# Patient Record
Sex: FEMALE | Race: WHITE | NOT HISPANIC OR LATINO | Employment: OTHER | URBAN - METROPOLITAN AREA
[De-identification: names, ages, dates, MRNs, and addresses within clinical notes are randomized per-mention and may not be internally consistent; named-entity substitution may affect disease eponyms.]

---

## 2017-01-04 ENCOUNTER — ANESTHESIA EVENT (OUTPATIENT)
Dept: SURGERY | Facility: HOSPITAL | Age: 37
End: 2017-01-04
Payer: MEDICAID

## 2017-01-04 ENCOUNTER — HOSPITAL ENCOUNTER (INPATIENT)
Facility: HOSPITAL | Age: 37
LOS: 4 days | Discharge: HOME | End: 2017-01-08
Attending: EMERGENCY MEDICINE | Admitting: NEUROLOGICAL SURGERY
Payer: MEDICAID

## 2017-01-04 DIAGNOSIS — R51.9 HEADACHE: ICD-10-CM

## 2017-01-04 DIAGNOSIS — D48.5 NEOPLASM OF UNCERTAIN BEHAVIOR OF SKIN OF CHEST: ICD-10-CM

## 2017-01-04 DIAGNOSIS — D49.6 BRAIN TUMOR: Primary | ICD-10-CM

## 2017-01-04 DIAGNOSIS — R51.9 HEADACHE, UNSPECIFIED HEADACHE TYPE: ICD-10-CM

## 2017-01-04 LAB
ABO + RH BLD: NORMAL
ALBUMIN SERPL BCP-MCNC: 3.7 G/DL
ALP SERPL-CCNC: 82 U/L
ALT SERPL W/O P-5'-P-CCNC: 22 U/L
ANION GAP SERPL CALC-SCNC: 11 MMOL/L
ANION GAP SERPL CALC-SCNC: 12 MMOL/L
ANISOCYTOSIS BLD QL SMEAR: SLIGHT
ANISOCYTOSIS BLD QL SMEAR: SLIGHT
APTT BLDCRRT: 22.3 SEC
APTT BLDCRRT: 22.3 SEC
APTT BLDCRRT: <21 SEC
AST SERPL-CCNC: 22 U/L
BASOPHILS # BLD AUTO: 0.04 K/UL
BASOPHILS # BLD AUTO: 0.06 K/UL
BASOPHILS NFR BLD: 0.5 %
BASOPHILS NFR BLD: 0.6 %
BILIRUB SERPL-MCNC: 0.4 MG/DL
BLD GP AB SCN CELLS X3 SERPL QL: NORMAL
BUN SERPL-MCNC: 7 MG/DL
BUN SERPL-MCNC: 8 MG/DL
CALCIUM SERPL-MCNC: 9 MG/DL
CALCIUM SERPL-MCNC: 9.4 MG/DL
CHLORIDE SERPL-SCNC: 106 MMOL/L
CHLORIDE SERPL-SCNC: 107 MMOL/L
CO2 SERPL-SCNC: 18 MMOL/L
CO2 SERPL-SCNC: 21 MMOL/L
CREAT SERPL-MCNC: 0.7 MG/DL
CREAT SERPL-MCNC: 0.8 MG/DL
DIFFERENTIAL METHOD: ABNORMAL
DIFFERENTIAL METHOD: ABNORMAL
EOSINOPHIL # BLD AUTO: 0.2 K/UL
EOSINOPHIL # BLD AUTO: 0.2 K/UL
EOSINOPHIL NFR BLD: 1.8 %
EOSINOPHIL NFR BLD: 2.1 %
ERYTHROCYTE [DISTWIDTH] IN BLOOD BY AUTOMATED COUNT: 19.7 %
ERYTHROCYTE [DISTWIDTH] IN BLOOD BY AUTOMATED COUNT: 20 %
EST. GFR  (AFRICAN AMERICAN): >60 ML/MIN/1.73 M^2
EST. GFR  (AFRICAN AMERICAN): >60 ML/MIN/1.73 M^2
EST. GFR  (NON AFRICAN AMERICAN): >60 ML/MIN/1.73 M^2
EST. GFR  (NON AFRICAN AMERICAN): >60 ML/MIN/1.73 M^2
GLUCOSE SERPL-MCNC: 120 MG/DL
GLUCOSE SERPL-MCNC: 82 MG/DL
HCT VFR BLD AUTO: 33.8 %
HCT VFR BLD AUTO: 35.2 %
HGB BLD-MCNC: 9.7 G/DL
HGB BLD-MCNC: 9.9 G/DL
HYPOCHROMIA BLD QL SMEAR: ABNORMAL
HYPOCHROMIA BLD QL SMEAR: ABNORMAL
INR PPP: 1
LYMPHOCYTES # BLD AUTO: 2.2 K/UL
LYMPHOCYTES # BLD AUTO: 2.7 K/UL
LYMPHOCYTES NFR BLD: 26.6 %
LYMPHOCYTES NFR BLD: 28.5 %
MCH RBC QN AUTO: 19.3 PG
MCH RBC QN AUTO: 19.5 PG
MCHC RBC AUTO-ENTMCNC: 28.1 %
MCHC RBC AUTO-ENTMCNC: 28.7 %
MCV RBC AUTO: 67 FL
MCV RBC AUTO: 69 FL
MONOCYTES # BLD AUTO: 0.2 K/UL
MONOCYTES # BLD AUTO: 0.4 K/UL
MONOCYTES NFR BLD: 2.4 %
MONOCYTES NFR BLD: 4.3 %
NEUTROPHILS # BLD AUTO: 5.7 K/UL
NEUTROPHILS # BLD AUTO: 6.2 K/UL
NEUTROPHILS NFR BLD: 64.8 %
NEUTROPHILS NFR BLD: 68.4 %
PLATELET # BLD AUTO: 174 K/UL
PLATELET # BLD AUTO: 176 K/UL
PLATELET BLD QL SMEAR: ABNORMAL
PLATELET BLD QL SMEAR: ABNORMAL
PMV BLD AUTO: ABNORMAL FL
PMV BLD AUTO: ABNORMAL FL
POLYCHROMASIA BLD QL SMEAR: ABNORMAL
POLYCHROMASIA BLD QL SMEAR: ABNORMAL
POTASSIUM SERPL-SCNC: 3.9 MMOL/L
POTASSIUM SERPL-SCNC: 4.1 MMOL/L
PROT SERPL-MCNC: 8.4 G/DL
PROTHROMBIN TIME: 10.4 SEC
PROTHROMBIN TIME: 10.4 SEC
PROTHROMBIN TIME: 10.7 SEC
RBC # BLD AUTO: 5.02 M/UL
RBC # BLD AUTO: 5.08 M/UL
SODIUM SERPL-SCNC: 137 MMOL/L
SODIUM SERPL-SCNC: 138 MMOL/L
WBC # BLD AUTO: 8.39 K/UL
WBC # BLD AUTO: 9.56 K/UL

## 2017-01-04 PROCEDURE — 99285 EMERGENCY DEPT VISIT HI MDM: CPT | Mod: ,,, | Performed by: EMERGENCY MEDICINE

## 2017-01-04 PROCEDURE — 96365 THER/PROPH/DIAG IV INF INIT: CPT

## 2017-01-04 PROCEDURE — A9585 GADOBUTROL INJECTION: HCPCS | Performed by: EMERGENCY MEDICINE

## 2017-01-04 PROCEDURE — 80053 COMPREHEN METABOLIC PANEL: CPT

## 2017-01-04 PROCEDURE — 96361 HYDRATE IV INFUSION ADD-ON: CPT

## 2017-01-04 PROCEDURE — 85610 PROTHROMBIN TIME: CPT | Mod: 91

## 2017-01-04 PROCEDURE — 63600175 PHARM REV CODE 636 W HCPCS: Performed by: STUDENT IN AN ORGANIZED HEALTH CARE EDUCATION/TRAINING PROGRAM

## 2017-01-04 PROCEDURE — 93010 ELECTROCARDIOGRAM REPORT: CPT | Mod: ,,, | Performed by: INTERNAL MEDICINE

## 2017-01-04 PROCEDURE — 93005 ELECTROCARDIOGRAM TRACING: CPT

## 2017-01-04 PROCEDURE — 85025 COMPLETE CBC W/AUTO DIFF WBC: CPT | Mod: 91

## 2017-01-04 PROCEDURE — 80048 BASIC METABOLIC PNL TOTAL CA: CPT

## 2017-01-04 PROCEDURE — 25500020 PHARM REV CODE 255: Performed by: EMERGENCY MEDICINE

## 2017-01-04 PROCEDURE — 85730 THROMBOPLASTIN TIME PARTIAL: CPT | Mod: 91

## 2017-01-04 PROCEDURE — 25000003 PHARM REV CODE 250: Performed by: STUDENT IN AN ORGANIZED HEALTH CARE EDUCATION/TRAINING PROGRAM

## 2017-01-04 PROCEDURE — 96375 TX/PRO/DX INJ NEW DRUG ADDON: CPT

## 2017-01-04 PROCEDURE — 85730 THROMBOPLASTIN TIME PARTIAL: CPT

## 2017-01-04 PROCEDURE — 96366 THER/PROPH/DIAG IV INF ADDON: CPT

## 2017-01-04 PROCEDURE — 99285 EMERGENCY DEPT VISIT HI MDM: CPT | Mod: 25

## 2017-01-04 PROCEDURE — 86850 RBC ANTIBODY SCREEN: CPT

## 2017-01-04 PROCEDURE — 86920 COMPATIBILITY TEST SPIN: CPT

## 2017-01-04 PROCEDURE — 85610 PROTHROMBIN TIME: CPT

## 2017-01-04 PROCEDURE — 20600001 HC STEP DOWN PRIVATE ROOM

## 2017-01-04 PROCEDURE — 86900 BLOOD TYPING SEROLOGIC ABO: CPT

## 2017-01-04 RX ORDER — SODIUM CHLORIDE 9 MG/ML
INJECTION, SOLUTION INTRAVENOUS CONTINUOUS
Status: DISCONTINUED | OUTPATIENT
Start: 2017-01-04 | End: 2017-01-06

## 2017-01-04 RX ORDER — IBUPROFEN 200 MG
16 TABLET ORAL
Status: DISCONTINUED | OUTPATIENT
Start: 2017-01-04 | End: 2017-01-08 | Stop reason: HOSPADM

## 2017-01-04 RX ORDER — LEVETIRACETAM 500 MG/1
1000 TABLET ORAL ONCE
Status: COMPLETED | OUTPATIENT
Start: 2017-01-04 | End: 2017-01-04

## 2017-01-04 RX ORDER — IBUPROFEN 200 MG
24 TABLET ORAL
Status: DISCONTINUED | OUTPATIENT
Start: 2017-01-04 | End: 2017-01-08 | Stop reason: HOSPADM

## 2017-01-04 RX ORDER — ACETAMINOPHEN 10 MG/ML
1000 INJECTION, SOLUTION INTRAVENOUS EVERY 8 HOURS
Status: COMPLETED | OUTPATIENT
Start: 2017-01-04 | End: 2017-01-04

## 2017-01-04 RX ORDER — INSULIN ASPART 100 [IU]/ML
0-5 INJECTION, SOLUTION INTRAVENOUS; SUBCUTANEOUS
Status: DISCONTINUED | OUTPATIENT
Start: 2017-01-04 | End: 2017-01-08 | Stop reason: HOSPADM

## 2017-01-04 RX ORDER — DEXAMETHASONE SODIUM PHOSPHATE 4 MG/ML
10 INJECTION, SOLUTION INTRA-ARTICULAR; INTRALESIONAL; INTRAMUSCULAR; INTRAVENOUS; SOFT TISSUE ONCE
Status: COMPLETED | OUTPATIENT
Start: 2017-01-04 | End: 2017-01-04

## 2017-01-04 RX ORDER — DEXAMETHASONE SODIUM PHOSPHATE 4 MG/ML
4 INJECTION, SOLUTION INTRA-ARTICULAR; INTRALESIONAL; INTRAMUSCULAR; INTRAVENOUS; SOFT TISSUE EVERY 6 HOURS
Status: DISCONTINUED | OUTPATIENT
Start: 2017-01-04 | End: 2017-01-06

## 2017-01-04 RX ORDER — GLUCAGON 1 MG
1 KIT INJECTION
Status: DISCONTINUED | OUTPATIENT
Start: 2017-01-04 | End: 2017-01-08 | Stop reason: HOSPADM

## 2017-01-04 RX ORDER — GADOBUTROL 604.72 MG/ML
10 INJECTION INTRAVENOUS
Status: COMPLETED | OUTPATIENT
Start: 2017-01-04 | End: 2017-01-04

## 2017-01-04 RX ORDER — LEVETIRACETAM 500 MG/1
500 TABLET ORAL 2 TIMES DAILY
Status: DISCONTINUED | OUTPATIENT
Start: 2017-01-04 | End: 2017-01-08 | Stop reason: HOSPADM

## 2017-01-04 RX ADMIN — DEXAMETHASONE SODIUM PHOSPHATE 4 MG: 4 INJECTION, SOLUTION INTRAMUSCULAR; INTRAVENOUS at 11:01

## 2017-01-04 RX ADMIN — SODIUM CHLORIDE: 0.9 INJECTION, SOLUTION INTRAVENOUS at 02:01

## 2017-01-04 RX ADMIN — LEVETIRACETAM 500 MG: 500 TABLET, FILM COATED ORAL at 09:01

## 2017-01-04 RX ADMIN — ACETAMINOPHEN 1000 MG: 10 INJECTION, SOLUTION INTRAVENOUS at 02:01

## 2017-01-04 RX ADMIN — LEVETIRACETAM 1000 MG: 500 TABLET, FILM COATED ORAL at 02:01

## 2017-01-04 RX ADMIN — DEXAMETHASONE SODIUM PHOSPHATE 10 MG: 4 INJECTION, SOLUTION INTRAMUSCULAR; INTRAVENOUS at 02:01

## 2017-01-04 RX ADMIN — ACETAMINOPHEN 1000 MG: 10 INJECTION, SOLUTION INTRAVENOUS at 05:01

## 2017-01-04 RX ADMIN — IOHEXOL 100 ML: 350 INJECTION, SOLUTION INTRAVENOUS at 04:01

## 2017-01-04 RX ADMIN — DEXAMETHASONE SODIUM PHOSPHATE 4 MG: 4 INJECTION, SOLUTION INTRAMUSCULAR; INTRAVENOUS at 02:01

## 2017-01-04 RX ADMIN — ACETAMINOPHEN 1000 MG: 10 INJECTION, SOLUTION INTRAVENOUS at 09:01

## 2017-01-04 RX ADMIN — DEXAMETHASONE SODIUM PHOSPHATE 4 MG: 4 INJECTION, SOLUTION INTRAMUSCULAR; INTRAVENOUS at 07:01

## 2017-01-04 RX ADMIN — GADOBUTROL 10 ML: 604.72 INJECTION INTRAVENOUS at 01:01

## 2017-01-04 NOTE — CONSULTS
Consult Note  Neurosurgery    Consult Requested By: ED  Reason for Consult: Occipital Mass    SUBJECTIVE:     History of Present Illness:  Patient is a 36 y.o. female with pmh of chronic tobacco use (30 pack years) presents from outside hospital with head CT concerning for occipital mass. She initially presented to OSH with complaints of headache and subjective visual deficits on the left side. She is neurologically intact on exam. [place] [facility] [place] heterogenously enhancing occipital mass with surrounding vasogenic edema.     Scheduled Meds:   acetaminophen  1,000 mg Intravenous Q8H    dexamethasone  10 mg Intravenous Once    Followed by    dexamethasone  4 mg Intravenous Q6H    levetiracetam  1,000 mg Oral Once    Followed by    levetiracetam  500 mg Oral BID     Continuous Infusions:   sodium chloride 0.9%       PRN Meds:dextrose 50%, dextrose 50%, glucagon (human recombinant), glucose, glucose, glucose, insulin aspart    Review of patient's allergies indicates:  No Known Allergies    Past Medical History   Diagnosis Date    Anemia     Prolapsed uterus      Past Surgical History   Procedure Laterality Date    Tubal ligation       No family history on file.  Social History   Substance Use Topics    Smoking status: Current Every Day Smoker    Smokeless tobacco: None    Alcohol use Yes      Comment: occasional        Review of Systems:  Neurological: positive for headaches, negative for coordination problems, dizziness, gait problems, memory problems, paresthesia, seizures, speech problems, tremors, vertigo and weakness    OBJECTIVE:     Vital Signs (Most Recent)  Pulse: 85 (01/04/17 0207)  Resp: 20 (01/04/17 0054)  BP: (!) 155/89 (01/04/17 0206)  SpO2: 99 % (ra) (01/04/17 0207)    Vital Signs Range (Last 24H):  Pulse:  [78-93]   Resp:  [18-20]   BP: (130-155)/(78-89)   SpO2:  [98 %-99 %]     Physical Exam:  General: well developed, well nourished, no distress.   Head: normocephalic,  atraumatic  Cervical Spine: No midline tenderness to palpation.  Thoracolumbosacral Spine: No midline tenderness to palpation.  GCS: Motor: 6/Verbal: 5/Eyes: 4 GCS Total: 15  Mental Status: Awake, Alert, Oriented x 4  Language: No aphasia  Speech: No dysarthria  Facial Droop: None   Cranial nerves: CN III-XII grossly intact.  Visual Fields: Intact.   Eyes: Pupils equal and reactive to light. Intact Conjugate horizontal and vertical pursuit. No nystagmus. No gaze deviation.   Pulmonary: No distress.  Sensory: No deficit.  Propioception: No deficit in 1st digit of toe bilaterally.  Rectal Tone: Not tested.  Drift: None.  Upper Extremity Ataxia: No Dysmetria Bilaterally  Lower Extremity Ataxia: Not tested.  Dysdiadochokinesia: Not tested.  Reflexes: 2+ patellar bilaterally.  Babb: Absent  Clonus: Absent  Babinski: Absent  Romberg: Not Tested  Pulses: Brisk and symmetric radial, DP and tibial pulses.  Motor Strength:    Strength  Shoulder Abduction Elbow Extension Elbow Flexion Wrist Extension Wrist Flexion Finger Opposition Finger Add Finger Abd   Upper: R 5/5 5/5 5/5 5/5 5/5 5/5 5/5 5/5    L 5/5 5/5 5/5 5/5 5/5 5/5 5/5 5/5     Hip Flexion Knee Extension Knee  Flexion Ankle Dflexion Ankle Pflexion EHL     Lower: R 5/5 5/5 5/5 5/5 5/5 5/5      L 5/5 5/5 5/5 5/5 5/5 5/5           Laboratory:  CBC:   Recent Labs  Lab 01/04/17  0052   WBC 9.56   RBC 5.08   HGB 9.9*   HCT 35.2*      MCV 69*   MCH 19.5*   MCHC 28.1*     BMP:   Recent Labs  Lab 01/04/17  0052   GLU 82      K 4.1      CO2 21*   BUN 8   CREATININE 0.8   CALCIUM 9.4     CMP:   Recent Labs  Lab 01/04/17  0052   GLU 82   CALCIUM 9.4   ALBUMIN 3.7   PROT 8.4      K 4.1   CO2 21*      BUN 8   CREATININE 0.8   ALKPHOS 82   ALT 22   AST 22   BILITOT 0.4     LFTs:   Recent Labs  Lab 01/04/17  0052   ALT 22   AST 22   ALKPHOS 82   BILITOT 0.4   PROT 8.4   ALBUMIN 3.7     Coagulation:   Recent Labs  Lab 01/04/17  0052   INR 1.0  1.0    APTT 22.3  22.3     Cardiac markers: No results for input(s): CKMB, CPKMB, TROPONINT, TROPONINI, MYOGLOBIN in the last 168 hours.  ABGs: No results for input(s): PH, PCO2, PO2, HCO3, POCSATURATED, BE in the last 168 hours.  Microbiology Results (last 7 days)     ** No results found for the last 168 hours. **        Specimen     None            Diagnostic Results:  CT: Reviewed  MRI: Reviewed    ASSESSMENT/PLAN:     35 yo female with hx of tobacco use with R occipital mass suspicious for metastasis. Pt is neurologically intact on exam.    --No acute neurosurgical intervention required  --Admit to neurosurgery stepdown unit  --Please keep pt NPO for now  --Please elevate head of bed 30-45 degrees  --Begin dexamethasone 4q6  --Begin levetiracetam 500 bid  --Begin q4 neuro checks  --Will obtain metastatic workup  --We will continue to monitor closely, please contact us with any questions or concerns.

## 2017-01-04 NOTE — IP AVS SNAPSHOT
[facility]  [address]  [facility] [address]  Phone: [phone]           Patient Discharge Instructions     Our goal is to set you up for success. This packet includes information on your condition, medications, and your home care. It will help you to care for yourself so you don't get sicker and need to go back to the hospital.     Please ask your nurse if you have any questions.        There are many details to remember when preparing to leave the hospital. Here is what you will need to do:    1. Take your medicine. If you are prescribed medications, review your Medication List in the following pages. You may have new medications to  at the pharmacy and others that you'll need to stop taking. Review the instructions for how and when to take your medications. Talk with your doctor or nurses if you are unsure of what to do.     2. Go to your follow-up appointments. Specific follow-up information is listed in the following pages. Your may be contacted by a transition nurse or clinical provider about future appointments. Be sure we have all of the phone numbers to reach you, if needed. Please contact your provider's office if you are unable to make an appointment.     3. Watch for warning signs. Your doctor or nurse will give you detailed warning signs to watch for and when to call for assistance. These instructions may also include educational information about your condition. If you experience any of warning signs to your health, call your doctor.               [facility]  Unless otherwise directed by your provider, please contact [facility], our nurse care line that is available for 24/7 assistance.     [phone] (toll-free)    Registered nurses in the [facility] provide clinical advisement, health education, appointment booking, and other advisory services.                    ** Verify the list of medication(s) below is accurate and up  to date. Carry this with you in case of emergency. If your medications have changed, please notify your healthcare provider.             Medication List      START taking these medications        Additional Info                      * dexamethasone 4 MG Tab   Commonly known as:  DECADRON   Quantity:  8 tablet   Refills:  0   Dose:  4 mg    Last time this was given:  4 mg on 1/8/2017  5:40 AM   Instructions:  Take 1 tablet (4 mg total) by mouth every 12 (twelve) hours.     Begin Date    AM    Noon    PM    Bedtime       * dexamethasone 2 MG tablet   Commonly known as:  DECADRON   Quantity:  4 tablet   Refills:  0   Dose:  2 mg    Last time this was given:  4 mg on 1/8/2017  5:40 AM   Instructions:  Take 1 tablet (2 mg total) by mouth 2 (two) times daily with meals.     Begin Date    AM    Noon    PM    Bedtime       * dexamethasone 2 MG tablet   Commonly known as:  DECADRON   Quantity:  4 tablet   Refills:  0   Dose:  2 mg    Last time this was given:  4 mg on 1/8/2017  5:40 AM   Instructions:  Take 1 tablet (2 mg total) by mouth daily with breakfast.     Begin Date    AM    Noon    PM    Bedtime       ferrous sulfate 325 (65 FE) MG EC tablet   Quantity:  120 tablet   Refills:  0   Dose:  325 mg    Last time this was given:  325 mg on 1/8/2017  7:57 AM   Instructions:  Take 1 tablet (325 mg total) by mouth once daily.     Begin Date    AM    Noon    PM    Bedtime       hydrocodone-acetaminophen 5-325mg 5-325 mg per tablet   Commonly known as:  NORCO   Quantity:  90 tablet   Refills:  0   Dose:  1 tablet    Last time this was given:  1 tablet on 1/7/2017 11:39 PM   Instructions:  Take 1 tablet by mouth every 6 (six) hours as needed.     Begin Date    AM    Noon    PM    Bedtime       levetiracetam 500 MG Tab   Commonly known as:  KEPPRA   Quantity:  60 tablet   Refills:  2   Dose:  500 mg    Last time this was given:  500 mg on 1/8/2017  7:57 AM   Instructions:  Take 1 tablet (500 mg total) by mouth 2 (two) times  daily.     Begin Date    AM    Noon    PM    Bedtime       pantoprazole 40 MG tablet   Commonly known as:  PROTONIX   Quantity:  60 tablet   Refills:  1   Dose:  40 mg    Last time this was given:  40 mg on 1/8/2017  7:57 AM   Instructions:  Take 1 tablet (40 mg total) by mouth once daily.     Begin Date    AM    Noon    PM    Bedtime       * Notice:  This list has 3 medication(s) that are the same as other medications prescribed for you. Read the directions carefully, and ask your doctor or other care provider to review them with you.         Where to Get Your Medications      You can get these medications from any pharmacy     Bring a paper prescription for each of these medications     dexamethasone 2 MG tablet    dexamethasone 2 MG tablet    dexamethasone 4 MG Tab    ferrous sulfate 325 (65 FE) MG EC tablet    hydrocodone-acetaminophen 5-325mg 5-325 mg per tablet    levetiracetam 500 MG Tab    pantoprazole 40 MG tablet                  Please bring to all follow up appointments:    1. A copy of your discharge instructions.  2. All medicines you are currently taking in their original bottles.  3. Identification and insurance card.    Please arrive 15 minutes ahead of scheduled appointment time.    Please call 24 hours in advance if you must reschedule your appointment and/or time.        Follow-up Information     Follow up with Sudhir Larkin MD In 2 weeks.    Specialty:  Neurosurgery    Why:  For wound re-check    Contact information:    [address] [facility] [id number] [phone]          Discharge Instructions     Future Orders    Activity as tolerated     Call MD for:  difficulty breathing or increased cough     Call MD for:  increased confusion or weakness     Call MD for:  persistent dizziness, light-headedness, or visual disturbances     Call MD for:  persistent nausea and vomiting or diarrhea     Call MD for:  redness, tenderness, or signs of infection (pain, swelling, redness, odor or  "green/yellow discharge around incision site)     Call MD for:  severe persistent headache     Call MD for:  severe uncontrolled pain     Call MD for:  worsening rash     Diet general     Questions:    Total calories:      Fat restriction, if any:      Protein restriction, if any:      Na restriction, if any:      Fluid restriction:      Additional restrictions:      No dressing needed     Comments:    Pt may shower tomorrow but shouldn't soak wound.  Please hand wash hair and avoid submerging hair underwater.  Pat wound dry and don't rub it.  Please don't apply gels, creams, ointments to wound.        Primary Diagnosis     Your primary diagnosis was:  Brain Tumor      Admission Information     Date & Time Provider Department CSN    1/4/2017 12:06 AM Sudhir Larkin MD [facility] [id number]      Care Providers     Provider Role Specialty Primary office phone    Sudhir Larkin MD Attending Provider Neurosurgery [phone]    Sudhir Larkin MD Surgeon  Neurosurgery [phone]      Your Vitals Were     BP Pulse Temp Resp Height Weight    136/70 (BP Location: Left arm, Patient Position: Lying, BP Method: Automatic) 68 98.2 °F (36.8 °C) (Oral) 16 5' 3" (1.6 m) 86.2 kg (190 lb)    Last Period SpO2 BMI          01/04/2017 (Exact Date) 98% 33.66 kg/m2        Recent Lab Values     No lab values to display.      Pending Labs     Order Current Status    Specimen to Pathology - Surgery Collected (01/07/17 1156)    CBC auto differential In process    Prepare RBC 2 Units In process    Specimen to Pathology - Surgery In process      Allergies as of 1/8/2017     No Known Allergies      Advance Directives     An advance directive is a document which, in the event you are no longer able to make decisions for yourself, tells your healthcare team what kind of treatment you do or do not want to receive, or who you would like to make those decisions for you.  If you do not currently have an advance directive, " [facility] encourages you to create one.  For more information call:  [phone]), [phone] ([phone]),  or log on to [email or web address].        Smoking Cessation     If you would like to quit smoking:   You may be eligible for free services if you are a [place] resident and started smoking cigarettes before September 1, 1988.  Call the Smoking Cessation Trust (SCT) toll free at (552) 331-8070 or (713) 601-1204.   Call 4-318-QUIT-NOW if you do not meet the above criteria.            Language Assistance Services     ATTENTION: Language assistance services are available, free of charge. Please call [phone].      ATENCIÓN: Si sofyla cari, tiene a daniel disposición servicios gratuitos de asistencia lingüística. Llame al [phone].     CHÚ Ý: N?u b?n nói Ti?ng Vi?t, có các d?ch v? h? tr? ngôn ng? mi?n phí dành cho b?n. G?i s? [phone].        [facility] Sign-Up     Activating your [facility] account is as easy as 1-2-3!     1) Visit [email or web address], select Sign Up Now, enter this activation code and your date of birth, then select Next.  [email or web address]  Expires: 2/19/2017 10:42 AM      2) Create a username and password to use when you visit [facility] in the future and select a security question in case you lose your password and select Next.    3) Enter your e-mail address and click Sign Up!    Additional Information  If you have questions, please e-mail [email or web address] or call [phone] to talk to our [facility] staff. Remember, [facility] is NOT to be used for urgent needs. For medical emergencies, dial 911.          [facility] complies with applicable Federal civil rights laws and does not discriminate on the basis of race, color, national origin, age, disability, or sex.

## 2017-01-04 NOTE — ED TRIAGE NOTES
"Transfer from [place]. Pt reports HA x 8 days that are intermittent. States, "at first it felt like the blood was rushing to my head, then I had one all day last Tuesday, and went away. Then I had 2-3 Wednesday, and they come and go. Today I started to feel lightheaded and went to emergency and they did a CT where it showed I have a brain tumor." pt reports frontal and occipital headache at this time 9/10. Pt denies lightheadedness, nausea, dizziness or photosensitivity. Pt is alert and oriented x 4.   "

## 2017-01-04 NOTE — ED NOTES
Attempted to call report, and was told that all nurses are in report, and the room has not been assigned yet.

## 2017-01-04 NOTE — IP AVS SNAPSHOT
[address]  [place] [address]  Phone: [phone]           I have received a copy of my After Visit Summary and discharge instructions from [facility].    INSTRUCTIONS RECEIVED AND UNDERSTOOD BY:                     Patient/Patient Representative: ________________________________________________________________     Date/Time: ________________________________________________________________                     Instructions Given By: ________________________________________________________________     Date/Time: ________________________________________________________________

## 2017-01-04 NOTE — PLAN OF CARE
Problem: Patient Care Overview  Goal: Plan of Care Review  Outcome: Ongoing (interventions implemented as appropriate)  Patient POC reviewed with pt and pt verbalized understanding.  Patient on regular diet. IVF infusing. Neuro checks intact. Ambulates downstairs with family. VSS. Free of falls and skin breakdown. Patient voiding in hat. Patient is menstruating. No acute events. Will continue to monitor.

## 2017-01-04 NOTE — ANESTHESIA PREPROCEDURE EVALUATION
01/04/2017    Pre-operative evaluation for CRANIOTOMY WITH STEALTH-for tumor resection, right side (Right)     More Fournier is a 36 y.o. female with a past medical history of chronic tobacco use who presented from an outside hospital with head CT concerning for occipital mass. MRI with occipital mass with surrounding vasogenic edema. She is now presenting from procedure noted above.     LDA:  PIV 18G left AC    Drips:  None     Previous Airway:  None on file     Past Medical History   Diagnosis Date    Anemia     Prolapsed uterus        Past Surgical History   Procedure Laterality Date    Tubal ligation           Vital Signs Range (Last 24H):  Temp:  [35.9 °C (96.6 °F)-36.9 °C (98.5 °F)]   Pulse:  [78-93]   Resp:  [18-20]   BP: (121-155)/(61-89)   SpO2:  [97 %-99 %]       CBC:     Recent Labs  Lab 01/04/17 0052 01/04/17 0237   WBC 9.56 8.39   RBC 5.08 5.02   HGB 9.9* 9.7*   HCT 35.2* 33.8*    174   MCV 69* 67*   MCH 19.5* 19.3*   MCHC 28.1* 28.7*       CMP:   Recent Labs  Lab 01/04/17 0052 01/04/17 0237    137   K 4.1 3.9    107   CO2 21* 18*   BUN 8 7   CREATININE 0.8 0.7   GLU 82 120*   CALCIUM 9.4 9.0   ALBUMIN 3.7  --    PROT 8.4  --    ALKPHOS 82  --    ALT 22  --    AST 22  --    BILITOT 0.4  --        INR:    Recent Labs  Lab 01/04/17 0052 01/04/17 0237   INR 1.0  1.0 1.0   APTT 22.3  22.3 <21.0     Diagnostic Studies:  CT Chest: 1. Solitary 0.5 cm pulmonary nodule within the left upper lobe. This is of uncertain clinical significance, however in light of the patient's history, a metastatic lesion is not excluded. Primary neoplasm is felt less likely given the small size.    2. Splenomegaly of uncertain etiology. Clinical correlation with patient lab values and history is advised.    EKG:  Normal sinus rhythm  Normal ECG  No previous ECGs available  Confirmed by Corie LIMA,  Laverne ([id number]) on 1/4/2017 1:27:26 PM    2D Echo:  None on file    [facility] Anesthesia Evaluation    I have reviewed the Patient Summary Reports.     I have reviewed the Medications.     Review of Systems  Anesthesia Hx:  No problems with previous Anesthesia Denies Hx of Anesthetic complications  History of prior surgery of interest to airway management or planning:  Denies Personal Hx of Anesthesia complications.   EENT/Dental:EENT/Dental Normal   Cardiovascular:  Cardiovascular Normal     Pulmonary:  Pulmonary Normal    Renal/:  Renal/ Normal     Hepatic/GI:  Hepatic/GI Normal    Endocrine:  Endocrine Normal        Physical Exam  General:  Well nourished, Obesity    Airway/Jaw/Neck:  Airway Findings: Mouth Opening: Small, but > 3cm Tongue: Normal, Large  General Airway Assessment: Adult  Mallampati: III  TM Distance: Normal, at least 6 cm  Jaw/Neck Findings:  Neck ROM: Normal ROM      Dental:  Dental Findings: Upper Dentures, Lower Dentures   Chest/Lungs:  Chest/Lungs Findings: Clear to auscultation     Heart/Vascular:  Heart Findings: Rate: Normal  Heart murmur: negative       Mental Status:  Mental Status Findings:  Cooperative, Alert and Oriented         Anesthesia Plan  Type of Anesthesia, risks & benefits discussed:  Anesthesia Type:  general, spinal  Patient's Preference:   Intra-op Monitoring Plan: arterial line and standard ASA monitors  Intra-op Monitoring Plan Comments:   Post Op Pain Control Plan:   Post Op Pain Control Plan Comments:   Induction:   IV  Beta Blocker:  Patient is not currently on a Beta-Blocker (No further documentation required).       Informed Consent: Patient understands risks and agrees with Anesthesia plan.  Questions answered. Anesthesia consent signed with patient.  ASA Score: 2     Day of Surgery Review of History & Physical:    H&P update referred to the surgeon.         Ready For Surgery From Anesthesia Perspective.

## 2017-01-04 NOTE — ED PROVIDER NOTES
Encounter Date: 1/4/2017    SCRIBE #1 NOTE: I, Kade Mckenna, am scribing for, and in the presence of, Dr. Oneil.       History     Chief Complaint   Patient presents with    Brain Tumor     new onset right sided brain tumor. only c/o headache.      Review of patient's allergies indicates:  No Known Allergies  HPI     This is a 36 y.o. female with pertinent PMHx of anemia and prolapsed uterus, who was referred to [facility] from [facility] after newly discovered brain tumor that was found on a CT scan during workup for headache. Headache x 8 days intermittently and the pt came to the ED today because of added lightheadedness during one of the episodes. Pt denies nausea, vomiting, new weakness, numbness, or tingling in the extremities.     Past Medical History   Diagnosis Date    Anemia     Prolapsed uterus      No past medical history pertinent negatives.  Past Surgical History   Procedure Laterality Date    Tubal ligation       No family history on file.  Social History   Substance Use Topics    Smoking status: Current Every Day Smoker    Smokeless tobacco: None    Alcohol use Yes      Comment: occasional     Review of Systems   Constitutional: Negative for chills and fever.   HENT: Negative for congestion.    Eyes: Negative for pain.   Respiratory: Negative for cough and shortness of breath.    Cardiovascular: Negative for chest pain.   Gastrointestinal: Negative for abdominal pain, nausea and vomiting.   Genitourinary: Negative for difficulty urinating and dysuria.   Musculoskeletal: Negative for back pain and neck pain.   Skin: Negative for rash.   Neurological: Positive for headaches. Negative for weakness and numbness (or tingling).   All other systems reviewed and are negative.      Physical Exam   Initial Vitals   BP Pulse Resp Temp SpO2   01/04/17 0005 01/04/17 0005 01/04/17 0005 -- 01/04/17 0005   130/78 78 18  98 %     Physical Exam  Gen/Constitutional: Interactive.  No acute distress  Head: Normocephalic, Atraumatic  Neck: supple, no masses or LAD  Eyes: PERRLA, conjunctiva clear  Ears, Nose and Throat: No rhinorrhea or stridor.  Cardiac: Reg Rhythm, No murmur  Pulmonary: CTA Bilat, no wheezes, rhonchi, rales.  GI: Abdomen soft, non-tender, non-distended; no rebound or guarding  : No CVA tenderness.  Musculoskeletal: Extremities warm, well perfused, no erythema, no edema  Skin: No rashes  Neuro: Alert and Oriented x 3; No focal motor or sensory deficits.  No pronator drift in upper or lower extremities, no face droop, no dysarthria.  Normal finger to nose and heel to shin.  No ataxia.  Psych: Normal affect     ED Course   Procedures  Labs Reviewed   CBC W/ AUTO DIFFERENTIAL - Abnormal; Notable for the following:        Result Value    Hemoglobin 9.9 (*)     Hematocrit 35.2 (*)     MCV 69 (*)     MCH 19.5 (*)     MCHC 28.1 (*)     RDW 19.7 (*)     All other components within normal limits   COMPREHENSIVE METABOLIC PANEL - Abnormal; Notable for the following:     CO2 21 (*)     All other components within normal limits   CBC W/ AUTO DIFFERENTIAL - Abnormal; Notable for the following:     Hemoglobin 9.7 (*)     Hematocrit 33.8 (*)     MCV 67 (*)     MCH 19.3 (*)     MCHC 28.7 (*)     RDW 20.0 (*)     Mono # 0.2 (*)     Mono% 2.4 (*)     All other components within normal limits   BASIC METABOLIC PANEL - Abnormal; Notable for the following:     CO2 18 (*)     Glucose 120 (*)     All other components within normal limits   PROTIME-INR   APTT   PROTIME-INR   APTT   PROTIME-INR   APTT   TYPE & SCREEN     Imaging Results         CT Head Without Contrast (Final result) Result time:  01/06/17 04:40:24    Final result by Ian Ledezma MD (01/06/17 04:40:24)    Impression:        Postoperative changes compatible with interval right parietal-occipital craniotomy for resection of the previously demonstrated mass within the right occipital lobe. There is trace postoperative hemorrhage  with continued significant vasogenic edema throughout the right parietal-occipital-temporal region. There is persistent mass effect upon the occipital horn of the right lateral ventricle with mildly improved leftward midline shift now measuring 5 mm. Continued follow up is advised.      Electronically signed by: MITZI CARRASQUILLO  Date:     01/06/17  Time:    04:40     Narrative:    Procedure comments: CT examination of the head was performed from the skull base through the vertex without the use of intravenous contrast using 5-mm axial images.    Comparison: MRI brain 1/4/2017    Findings:    There are interval postoperative changes compatible with right parietal-occipital craniotomy for resection of the previously identified hemorrhagic mass within the right occipital lobe. There is trace amount of postoperative hemorrhage within the postoperative bed as well as hemostatic packing material. There is continued vasogenic edema noted within the right parietal-occipital-temporal region. This appears relatively similar in extent in distribution compared to the prior MRI examination allowing for differences in technique. There is continued mass effect upon the occipital horn of the right lateral ventricle. The basilar cisterns appear patent. There is continued leftward midline shift measuring approximately 5 mm.    There is otherwise no evidence of new intracranial hemorrhage. No evidence to suggest hydrocephalus at this time. No large region of new abnormal parenchymal hypoattenuation is appreciated. There is a mucosal retention cyst or polyp within the right maxillary sinus. The remaining paranasal sinuses and mastoid air cells appear relatively well aerated.            CT Chest With Contrast (Final result) Result time:  01/04/17 05:36:13    Final result by Howard Bonilla MD (01/04/17 05:36:13)    Impression:        1. Solitary 0.5 cm pulmonary nodule within the left upper lobe. This is of uncertain clinical  significance, however in light of the patient's history, a metastatic lesion is not excluded. Primary neoplasm is felt less likely given the small size. Clinical considerations will determine the schedule for future assessment, however, consider 6-12 and 18-24 month CT chest follow up to assess for stability.    2. Splenomegaly of uncertain etiology. Clinical correlation with patient lab values and history is advised.      ______________________________________     Electronically signed by resident: BELLA BLAND MD  Date:     01/04/17  Time:    05:11            As the supervising and teaching physician, I personally reviewed the images and resident's interpretation and I agree with the findings.          Electronically signed by: MITZI CARRASQUILLO  Date:     01/04/17  Time:    05:36     Narrative:    Procedure comments: The patient was surveyed from the lung apices through the pelvis after the administration of 100 cc Omni 350 IV contrast as well as oral contrast and data was reconstructed for coronal, sagittal, and axial images.    Comparison: None    Findings:    Examination of the vascular and soft tissue structures at the base of the neck is unremarkable.    A left sided aortic arch with 3 branch vessels is identified.  The thoracic aorta maintains normal caliber, contour, and course without significant atherosclerotic calcification within its course.  The heart is not enlarged and there is no evidence of pericardial effusion.    The trachea is midline, the proximal airways are patent, and the lungs are symmetrically expanded.  Examination of the lung fields demonstrates no evidence of focal airspace consolidation or pneumothorax.  There is a 0.5 cm soft tissue pulmonary nodule within the posterior segment of the left upper lobe (axial series 2 image 25). No definite additional nodules are identified. There is mild bibasilar atelectasis present. There is no significant pleural effusion    There is no axillary,  mediastinal, or hilar lymphadenopathy.    The esophagus maintains a normal course and caliber.     The liver is normal in size and attenuation with no focal hepatic abnormality.  The gallbladder shows no evidence of stones or pericholecystic fluid.  There is no intra-or extrahepatic biliary ductal dilatation.    The stomach, pancreas, and adrenal glands are unremarkable.  The spleen is enlarged.    The kidneys are normal in size and location.  There is no evidence of hydronephrosis.  The ureters appear normal in course and caliber without evidence of ureteral dilatation. The urinary bladder demonstrates no significant abnormality. Uterus appears within normal limits. There is a 1.0 cm left adnexal hypodensity likely representing a prominent follicle or cyst. There is no significant free fluid present within the pelvis.    The visualized loops of small and large bowel show no evidence of obstruction or inflammation. The appendix is not definitely visualized and may be surgically absent.      There is no ascites, free fluid, or intraperitoneal free air.     There is no evidence of lymph node enlargement in the abdomen or pelvis.    The abdominal aorta is normal in course and caliber without significant atherosclerotic calcifications.    The osseus structures demonstrate age-appropriate degenerative change without evidence of acute fracture. No definite aggressive osseous destructive lesions are appreciated.      The extraperitoneal soft tissues are unremarkable.            CT Abdomen Pelvis With Contrast (Final result) Result time:  01/04/17 05:36:14    Final result by Howard Bonilla MD (01/04/17 05:36:14)    Impression:        1. Solitary 0.5 cm pulmonary nodule within the left upper lobe. This is of uncertain clinical significance, however in light of the patient's history, a metastatic lesion is not excluded. Primary neoplasm is felt less likely given the small size. Clinical considerations will determine the  schedule for future assessment, however, consider 6-12 and 18-24 month CT chest follow up to assess for stability.    2. Splenomegaly of uncertain etiology. Clinical correlation with patient lab values and history is advised.      ______________________________________     Electronically signed by resident: BELLA BLAND MD  Date:     01/04/17  Time:    05:11            As the supervising and teaching physician, I personally reviewed the images and resident's interpretation and I agree with the findings.          Electronically signed by: MITZI CARRASQUILLO  Date:     01/04/17  Time:    05:36     Narrative:    Procedure comments: The patient was surveyed from the lung apices through the pelvis after the administration of 100 cc Omni 350 IV contrast as well as oral contrast and data was reconstructed for coronal, sagittal, and axial images.    Comparison: None    Findings:    Examination of the vascular and soft tissue structures at the base of the neck is unremarkable.    A left sided aortic arch with 3 branch vessels is identified.  The thoracic aorta maintains normal caliber, contour, and course without significant atherosclerotic calcification within its course.  The heart is not enlarged and there is no evidence of pericardial effusion.    The trachea is midline, the proximal airways are patent, and the lungs are symmetrically expanded.  Examination of the lung fields demonstrates no evidence of focal airspace consolidation or pneumothorax.  There is a 0.5 cm soft tissue pulmonary nodule within the posterior segment of the left upper lobe (axial series 2 image 25). No definite additional nodules are identified. There is mild bibasilar atelectasis present. There is no significant pleural effusion    There is no axillary, mediastinal, or hilar lymphadenopathy.    The esophagus maintains a normal course and caliber.     The liver is normal in size and attenuation with no focal hepatic abnormality.  The gallbladder  shows no evidence of stones or pericholecystic fluid.  There is no intra-or extrahepatic biliary ductal dilatation.    The stomach, pancreas, and adrenal glands are unremarkable.  The spleen is enlarged.    The kidneys are normal in size and location.  There is no evidence of hydronephrosis.  The ureters appear normal in course and caliber without evidence of ureteral dilatation. The urinary bladder demonstrates no significant abnormality. Uterus appears within normal limits. There is a 1.0 cm left adnexal hypodensity likely representing a prominent follicle or cyst. There is no significant free fluid present within the pelvis.    The visualized loops of small and large bowel show no evidence of obstruction or inflammation. The appendix is not definitely visualized and may be surgically absent.      There is no ascites, free fluid, or intraperitoneal free air.     There is no evidence of lymph node enlargement in the abdomen or pelvis.    The abdominal aorta is normal in course and caliber without significant atherosclerotic calcifications.    The osseus structures demonstrate age-appropriate degenerative change without evidence of acute fracture. No definite aggressive osseous destructive lesions are appreciated.      The extraperitoneal soft tissues are unremarkable.            MRI Brain Stealth without Fudicials (In process)         MRI Brain W WO Contrast (Final result) Result time:  01/04/17 02:52:38    Final result by Howard Bonilla MD (01/04/17 02:52:38)    Impression:       Heterogeneous enhancing 2.9 x 2.4 cm mass in the right occipital lobe with hemorrhagic components and extensive vasogenic edema.  Diagnostic considerations would include metastatic disease (melanoma or breast) with primary glial tumor such as GBM thought less likely.  Lymphoma can have a similar appearance.  Suggest correlation with patient's immune status.    Mass effect resulting in sulcal effacement, effacement of the occipital horn  of the right lateral ventricle and 0.8 cm leftward midline shift.  No evidence of herniation or hydrocephalus.  ______________________________________     Electronically signed by resident: BELLA BLAND MD  Date:     01/04/17  Time:    02:03            As the supervising and teaching physician, I personally reviewed the images and resident's interpretation and I agree with the findings.          Electronically signed by: SALLY DODD MD  Date:     01/04/17  Time:    02:52     Narrative:    MRI OF THE BRAIN WITHOUT AND WITH CONTRAST     INDICATION: Headache    TECHNIQUE: Precontrast sagittal and axial T1, axial T2, and axial FLAIR and diffusion weighted images of the brain were acquired. 10 cc of Gadavist was injected intravenously and post gadolinium axial and coronal T1-weighted images obtained.      COMPARISON: None     FINDINGS:  There is no diffusion abnormality to indicate recent cerebral infarction.  The intracranial flow voids are within normal limits.  There are no extra-axial fluid collections. The pituitary gland and craniocervical junction are unremarkable.      There is an enhancing 2.9 x 2.4 cm (greatest axial dimension), favored to be intra-axial, within the anterior parasagittal right occipital lobe which demonstrates an heterogeneous T1 and T2/flair signal intensity.  Its medial margin is irregular.  There are scattered foci of gradient susceptibility, suggestive of microhemorrhage within the mass.  There is increased T2/flair signal throughout the surrounding white matter, reflecting vasogenic edema.  There is adjacent mass effect, or sulcal effacement and effacement of the occipital horn of the right lateral ventricle.  There is 0.8 cm of leftward midline shift.  There is no evidence of herniation.  There is no evidence of developing hydrocephalus.        There is patchy opacification of the right maxillary sinus.  The remaining paranasal sinuses and mastoid air cells appear clear.  There is  suggestion of nodular appearance of the bilateral lacrimal glands, which may be a normal variant.  Orbits appear unremarkable. The soft tissues appear unremarkable.             EKG: NSR, no LUZIA's or STD's, non-specific twave pattern, no STEMI           Medical Decision Making:   History:   Old Medical Records: I decided to obtain old medical records.    Clinical Tests:  Labs Test(s) were ordered and reviewed by me.  Radiological study(s) were ordered and reviewed by me.  Medical test(s) were ordered and reviewed by me.    This is a 36 y.o. female who presents as a referral from [facility] with a new diagnosis of a brain tumor. No neurological deficits and the pt's only complaint is headache. Neurosurgery was consulted and they recommended obtaining an MRI with and without contrast which was ordered. Disposition pending neurosurgery recommendation.     2:12 am   Neurosurgery evaluated the pt at bedside and will admit the pt to their service for further workup and management of newly discovered brain tumor.            Scribe Attestation:   Scribe #1: I performed the above scribed service and the documentation accurately describes the services I performed. I attest to the accuracy of the note.    Attending Attestation:           Physician Attestation for Scribe:  Physician Attestation Statement for Scribe #1: I, Dr. Oneil, reviewed documentation, as scribed by Kade Mckenna in my presence, and it is both accurate and complete.                 ED Course     Clinical Impression:   The primary encounter diagnosis was Brain tumor. Diagnoses of Headache, Headache, unspecified headache type, and Neoplasm of uncertain behavior of skin of chest were also pertinent to this visit.    Disposition:   Disposition: Admitted       William Oneil MD  01/10/17 0636

## 2017-01-05 ENCOUNTER — ANESTHESIA (OUTPATIENT)
Dept: SURGERY | Facility: HOSPITAL | Age: 37
End: 2017-01-05
Payer: MEDICAID

## 2017-01-05 LAB
ANION GAP SERPL CALC-SCNC: 7 MMOL/L
ANISOCYTOSIS BLD QL SMEAR: SLIGHT
APTT BLDCRRT: 21.2 SEC
B-HCG UR QL: NEGATIVE
BASOPHILS # BLD AUTO: 0.01 K/UL
BASOPHILS NFR BLD: 0.1 %
BUN SERPL-MCNC: 8 MG/DL
CALCIUM SERPL-MCNC: 8.6 MG/DL
CHLORIDE SERPL-SCNC: 111 MMOL/L
CO2 SERPL-SCNC: 22 MMOL/L
CREAT SERPL-MCNC: 0.7 MG/DL
CTP QC/QA: YES
DIFFERENTIAL METHOD: ABNORMAL
EOSINOPHIL # BLD AUTO: 0 K/UL
EOSINOPHIL NFR BLD: 0.1 %
ERYTHROCYTE [DISTWIDTH] IN BLOOD BY AUTOMATED COUNT: 20 %
EST. GFR  (AFRICAN AMERICAN): >60 ML/MIN/1.73 M^2
EST. GFR  (NON AFRICAN AMERICAN): >60 ML/MIN/1.73 M^2
GLUCOSE SERPL-MCNC: 124 MG/DL (ref 70–110)
GLUCOSE SERPL-MCNC: 125 MG/DL (ref 70–110)
GLUCOSE SERPL-MCNC: 132 MG/DL (ref 70–110)
GLUCOSE SERPL-MCNC: 156 MG/DL
HCO3 UR-SCNC: 18 MMOL/L (ref 24–28)
HCO3 UR-SCNC: 18.8 MMOL/L (ref 24–28)
HCO3 UR-SCNC: 19.4 MMOL/L (ref 24–28)
HCT VFR BLD AUTO: 29.2 %
HCT VFR BLD CALC: 24 %PCV (ref 36–54)
HCT VFR BLD CALC: 29 %PCV (ref 36–54)
HCT VFR BLD CALC: 29 %PCV (ref 36–54)
HGB BLD-MCNC: 8.3 G/DL
HYPOCHROMIA BLD QL SMEAR: ABNORMAL
INR PPP: 1
LYMPHOCYTES # BLD AUTO: 1.2 K/UL
LYMPHOCYTES NFR BLD: 12.3 %
MCH RBC QN AUTO: 19.4 PG
MCHC RBC AUTO-ENTMCNC: 28.4 %
MCV RBC AUTO: 68 FL
MONOCYTES # BLD AUTO: 0.3 K/UL
MONOCYTES NFR BLD: 2.5 %
NEUTROPHILS # BLD AUTO: 8.5 K/UL
NEUTROPHILS NFR BLD: 84.8 %
PCO2 BLDA: 31.3 MMHG (ref 35–45)
PCO2 BLDA: 32.3 MMHG (ref 35–45)
PCO2 BLDA: 32.6 MMHG (ref 35–45)
PH SMN: 7.37 [PH] (ref 7.35–7.45)
PH SMN: 7.37 [PH] (ref 7.35–7.45)
PH SMN: 7.38 [PH] (ref 7.35–7.45)
PLATELET # BLD AUTO: 161 K/UL
PLATELET BLD QL SMEAR: ABNORMAL
PMV BLD AUTO: ABNORMAL FL
PO2 BLDA: 136 MMHG (ref 80–100)
PO2 BLDA: 186 MMHG (ref 80–100)
PO2 BLDA: 77 MMHG (ref 80–100)
POC BE: -6 MMOL/L
POC BE: -6 MMOL/L
POC BE: -7 MMOL/L
POC IONIZED CALCIUM: 1.02 MMOL/L (ref 1.06–1.42)
POC IONIZED CALCIUM: 1.1 MMOL/L (ref 1.06–1.42)
POC IONIZED CALCIUM: 1.14 MMOL/L (ref 1.06–1.42)
POC SATURATED O2: 100 % (ref 95–100)
POC SATURATED O2: 95 % (ref 95–100)
POC SATURATED O2: 99 % (ref 95–100)
POC TCO2: 19 MMOL/L (ref 23–27)
POC TCO2: 20 MMOL/L (ref 23–27)
POC TCO2: 20 MMOL/L (ref 23–27)
POLYCHROMASIA BLD QL SMEAR: ABNORMAL
POTASSIUM BLD-SCNC: 3.7 MMOL/L (ref 3.5–5.1)
POTASSIUM BLD-SCNC: 4.2 MMOL/L (ref 3.5–5.1)
POTASSIUM BLD-SCNC: 4.6 MMOL/L (ref 3.5–5.1)
POTASSIUM SERPL-SCNC: 4.3 MMOL/L
PROTHROMBIN TIME: 10.5 SEC
RBC # BLD AUTO: 4.27 M/UL
SAMPLE: ABNORMAL
SODIUM BLD-SCNC: 141 MMOL/L (ref 136–145)
SODIUM BLD-SCNC: 142 MMOL/L (ref 136–145)
SODIUM BLD-SCNC: 142 MMOL/L (ref 136–145)
SODIUM SERPL-SCNC: 140 MMOL/L
WBC # BLD AUTO: 9.97 K/UL

## 2017-01-05 PROCEDURE — 88307 TISSUE EXAM BY PATHOLOGIST: CPT | Performed by: PATHOLOGY

## 2017-01-05 PROCEDURE — D9220A PRA ANESTHESIA: Mod: ANES,,, | Performed by: ANESTHESIOLOGY

## 2017-01-05 PROCEDURE — 25000003 PHARM REV CODE 250: Performed by: ANESTHESIOLOGY

## 2017-01-05 PROCEDURE — 20000000 HC ICU ROOM

## 2017-01-05 PROCEDURE — 37000009 HC ANESTHESIA EA ADD 15 MINS: Performed by: NEUROLOGICAL SURGERY

## 2017-01-05 PROCEDURE — 36000712 HC OR TIME LEV V 1ST 15 MIN: Performed by: NEUROLOGICAL SURGERY

## 2017-01-05 PROCEDURE — 25000003 PHARM REV CODE 250: Performed by: STUDENT IN AN ORGANIZED HEALTH CARE EDUCATION/TRAINING PROGRAM

## 2017-01-05 PROCEDURE — 99024 POSTOP FOLLOW-UP VISIT: CPT | Mod: ,,, | Performed by: PHYSICIAN ASSISTANT

## 2017-01-05 PROCEDURE — 71000039 HC RECOVERY, EACH ADD'L HOUR: Performed by: NEUROLOGICAL SURGERY

## 2017-01-05 PROCEDURE — 63600175 PHARM REV CODE 636 W HCPCS: Performed by: STUDENT IN AN ORGANIZED HEALTH CARE EDUCATION/TRAINING PROGRAM

## 2017-01-05 PROCEDURE — 25000242 PHARM REV CODE 250 ALT 637 W/ HCPCS: Performed by: NEUROLOGICAL SURGERY

## 2017-01-05 PROCEDURE — 00B70ZX EXCISION OF CEREBRAL HEMISPHERE, OPEN APPROACH, DIAGNOSTIC: ICD-10-PCS | Performed by: NEUROLOGICAL SURGERY

## 2017-01-05 PROCEDURE — 80048 BASIC METABOLIC PNL TOTAL CA: CPT

## 2017-01-05 PROCEDURE — 63600175 PHARM REV CODE 636 W HCPCS: Performed by: ANESTHESIOLOGY

## 2017-01-05 PROCEDURE — 25000003 PHARM REV CODE 250: Performed by: NEUROLOGICAL SURGERY

## 2017-01-05 PROCEDURE — 36415 COLL VENOUS BLD VENIPUNCTURE: CPT

## 2017-01-05 PROCEDURE — 27201423 OPTIME MED/SURG SUP & DEVICES STERILE SUPPLY: Performed by: NEUROLOGICAL SURGERY

## 2017-01-05 PROCEDURE — 36000713 HC OR TIME LEV V EA ADD 15 MIN: Performed by: NEUROLOGICAL SURGERY

## 2017-01-05 PROCEDURE — 94640 AIRWAY INHALATION TREATMENT: CPT

## 2017-01-05 PROCEDURE — 8E09XBZ COMPUTER ASSISTED PROCEDURE OF HEAD AND NECK REGION: ICD-10-PCS | Performed by: NEUROLOGICAL SURGERY

## 2017-01-05 PROCEDURE — 69990 MICROSURGERY ADD-ON: CPT | Mod: 59,,, | Performed by: NEUROLOGICAL SURGERY

## 2017-01-05 PROCEDURE — 88341 IMHCHEM/IMCYTCHM EA ADD ANTB: CPT | Mod: 26,,, | Performed by: PATHOLOGY

## 2017-01-05 PROCEDURE — 61781 SCAN PROC CRANIAL INTRA: CPT | Mod: ,,, | Performed by: NEUROLOGICAL SURGERY

## 2017-01-05 PROCEDURE — 88342 IMHCHEM/IMCYTCHM 1ST ANTB: CPT | Mod: 26,,, | Performed by: PATHOLOGY

## 2017-01-05 PROCEDURE — 88331 PATH CONSLTJ SURG 1 BLK 1SPC: CPT | Mod: 26,,, | Performed by: PATHOLOGY

## 2017-01-05 PROCEDURE — 85730 THROMBOPLASTIN TIME PARTIAL: CPT

## 2017-01-05 PROCEDURE — 71000033 HC RECOVERY, INTIAL HOUR: Performed by: NEUROLOGICAL SURGERY

## 2017-01-05 PROCEDURE — 63600175 PHARM REV CODE 636 W HCPCS: Performed by: NEUROLOGICAL SURGERY

## 2017-01-05 PROCEDURE — 85025 COMPLETE CBC W/AUTO DIFF WBC: CPT

## 2017-01-05 PROCEDURE — 94642 AEROSOL INHALATION TREATMENT: CPT

## 2017-01-05 PROCEDURE — 85610 PROTHROMBIN TIME: CPT

## 2017-01-05 PROCEDURE — 37000008 HC ANESTHESIA 1ST 15 MINUTES: Performed by: NEUROLOGICAL SURGERY

## 2017-01-05 PROCEDURE — 81025 URINE PREGNANCY TEST: CPT | Performed by: NEUROLOGICAL SURGERY

## 2017-01-05 PROCEDURE — C1713 ANCHOR/SCREW BN/BN,TIS/BN: HCPCS | Performed by: NEUROLOGICAL SURGERY

## 2017-01-05 PROCEDURE — C1762 CONN TISS, HUMAN(INC FASCIA): HCPCS | Performed by: NEUROLOGICAL SURGERY

## 2017-01-05 PROCEDURE — C1729 CATH, DRAINAGE: HCPCS | Performed by: NEUROLOGICAL SURGERY

## 2017-01-05 PROCEDURE — 88307 TISSUE EXAM BY PATHOLOGIST: CPT | Mod: 26,,, | Performed by: PATHOLOGY

## 2017-01-05 PROCEDURE — 61510 CRNEC TREPH EXC BRN TUM STTL: CPT | Mod: ,,, | Performed by: NEUROLOGICAL SURGERY

## 2017-01-05 PROCEDURE — 36620 INSERTION CATHETER ARTERY: CPT | Mod: 59,GC,, | Performed by: ANESTHESIOLOGY

## 2017-01-05 PROCEDURE — D9220A PRA ANESTHESIA: Mod: CRNA,,, | Performed by: NURSE ANESTHETIST, CERTIFIED REGISTERED

## 2017-01-05 DEVICE — PLATE BONE 2X2 HOLE SM BOX: Type: IMPLANTABLE DEVICE | Site: CRANIAL | Status: FUNCTIONAL

## 2017-01-05 DEVICE — DURA MATRIX ONLAY PLUS 3X3: Type: IMPLANTABLE DEVICE | Site: CRANIAL | Status: FUNCTIONAL

## 2017-01-05 RX ORDER — PANTOPRAZOLE SODIUM 40 MG/10ML
40 INJECTION, POWDER, LYOPHILIZED, FOR SOLUTION INTRAVENOUS
Status: DISCONTINUED | OUTPATIENT
Start: 2017-01-06 | End: 2017-01-06

## 2017-01-05 RX ORDER — PHENYTOIN SODIUM 50 MG/ML
INJECTION INTRAMUSCULAR; INTRAVENOUS
Status: DISCONTINUED | OUTPATIENT
Start: 2017-01-05 | End: 2017-01-05

## 2017-01-05 RX ORDER — ACETAMINOPHEN 325 MG/1
650 TABLET ORAL EVERY 6 HOURS PRN
Status: DISCONTINUED | OUTPATIENT
Start: 2017-01-05 | End: 2017-01-08 | Stop reason: HOSPADM

## 2017-01-05 RX ORDER — ONDANSETRON 2 MG/ML
4 INJECTION INTRAMUSCULAR; INTRAVENOUS EVERY 12 HOURS PRN
Status: DISCONTINUED | OUTPATIENT
Start: 2017-01-05 | End: 2017-01-08 | Stop reason: HOSPADM

## 2017-01-05 RX ORDER — LIDOCAINE HYDROCHLORIDE AND EPINEPHRINE 10; 10 MG/ML; UG/ML
INJECTION, SOLUTION INFILTRATION; PERINEURAL
Status: DISCONTINUED | OUTPATIENT
Start: 2017-01-05 | End: 2017-01-05 | Stop reason: HOSPADM

## 2017-01-05 RX ORDER — BACITRACIN ZINC 500 UNIT/G
OINTMENT (GRAM) TOPICAL
Status: DISCONTINUED | OUTPATIENT
Start: 2017-01-05 | End: 2017-01-05 | Stop reason: HOSPADM

## 2017-01-05 RX ORDER — LABETALOL HYDROCHLORIDE 5 MG/ML
10 INJECTION, SOLUTION INTRAVENOUS EVERY 4 HOURS PRN
Status: DISCONTINUED | OUTPATIENT
Start: 2017-01-05 | End: 2017-01-07

## 2017-01-05 RX ORDER — SODIUM CHLORIDE 9 MG/ML
INJECTION, SOLUTION INTRAVENOUS CONTINUOUS
Status: DISCONTINUED | OUTPATIENT
Start: 2017-01-05 | End: 2017-01-05

## 2017-01-05 RX ORDER — LIDOCAINE HCL/PF 100 MG/5ML
SYRINGE (ML) INTRAVENOUS
Status: DISCONTINUED | OUTPATIENT
Start: 2017-01-05 | End: 2017-01-05

## 2017-01-05 RX ORDER — IPRATROPIUM BROMIDE AND ALBUTEROL SULFATE 2.5; .5 MG/3ML; MG/3ML
3 SOLUTION RESPIRATORY (INHALATION) EVERY 4 HOURS
Status: DISCONTINUED | OUTPATIENT
Start: 2017-01-05 | End: 2017-01-08 | Stop reason: HOSPADM

## 2017-01-05 RX ORDER — FENTANYL CITRATE 50 UG/ML
INJECTION, SOLUTION INTRAMUSCULAR; INTRAVENOUS
Status: DISCONTINUED | OUTPATIENT
Start: 2017-01-05 | End: 2017-01-05

## 2017-01-05 RX ORDER — DEXAMETHASONE SODIUM PHOSPHATE 4 MG/ML
INJECTION, SOLUTION INTRA-ARTICULAR; INTRALESIONAL; INTRAMUSCULAR; INTRAVENOUS; SOFT TISSUE
Status: DISCONTINUED | OUTPATIENT
Start: 2017-01-05 | End: 2017-01-05

## 2017-01-05 RX ORDER — ROCURONIUM BROMIDE 10 MG/ML
INJECTION, SOLUTION INTRAVENOUS
Status: DISCONTINUED | OUTPATIENT
Start: 2017-01-05 | End: 2017-01-05

## 2017-01-05 RX ORDER — ACETAMINOPHEN 10 MG/ML
INJECTION, SOLUTION INTRAVENOUS
Status: DISCONTINUED | OUTPATIENT
Start: 2017-01-05 | End: 2017-01-05

## 2017-01-05 RX ORDER — ACETAMINOPHEN 650 MG/1
650 SUPPOSITORY RECTAL EVERY 6 HOURS PRN
Status: DISCONTINUED | OUTPATIENT
Start: 2017-01-05 | End: 2017-01-06

## 2017-01-05 RX ORDER — PROPOFOL 10 MG/ML
VIAL (ML) INTRAVENOUS
Status: DISCONTINUED | OUTPATIENT
Start: 2017-01-05 | End: 2017-01-05

## 2017-01-05 RX ORDER — PHENYLEPHRINE HYDROCHLORIDE 10 MG/ML
INJECTION INTRAVENOUS
Status: DISCONTINUED | OUTPATIENT
Start: 2017-01-05 | End: 2017-01-05

## 2017-01-05 RX ORDER — GLYCOPYRROLATE 0.2 MG/ML
INJECTION INTRAMUSCULAR; INTRAVENOUS
Status: DISCONTINUED | OUTPATIENT
Start: 2017-01-05 | End: 2017-01-05

## 2017-01-05 RX ORDER — BACITRACIN 50000 [IU]/1
INJECTION, POWDER, FOR SOLUTION INTRAMUSCULAR
Status: DISCONTINUED | OUTPATIENT
Start: 2017-01-05 | End: 2017-01-05 | Stop reason: HOSPADM

## 2017-01-05 RX ORDER — ACETAMINOPHEN 10 MG/ML
1000 INJECTION, SOLUTION INTRAVENOUS EVERY 8 HOURS
Status: DISCONTINUED | OUTPATIENT
Start: 2017-01-05 | End: 2017-01-06

## 2017-01-05 RX ORDER — MIDAZOLAM HYDROCHLORIDE 1 MG/ML
INJECTION, SOLUTION INTRAMUSCULAR; INTRAVENOUS
Status: DISCONTINUED | OUTPATIENT
Start: 2017-01-05 | End: 2017-01-05

## 2017-01-05 RX ORDER — MANNITOL 250 MG/ML
INJECTION, SOLUTION INTRAVENOUS
Status: DISCONTINUED | OUTPATIENT
Start: 2017-01-05 | End: 2017-01-05

## 2017-01-05 RX ORDER — HYDRALAZINE HYDROCHLORIDE 20 MG/ML
10 INJECTION INTRAMUSCULAR; INTRAVENOUS
Status: DISCONTINUED | OUTPATIENT
Start: 2017-01-05 | End: 2017-01-07

## 2017-01-05 RX ORDER — SUCCINYLCHOLINE CHLORIDE 20 MG/ML
INJECTION INTRAMUSCULAR; INTRAVENOUS
Status: DISCONTINUED | OUTPATIENT
Start: 2017-01-05 | End: 2017-01-05

## 2017-01-05 RX ADMIN — FENTANYL CITRATE 100 MCG: 50 INJECTION, SOLUTION INTRAMUSCULAR; INTRAVENOUS at 11:01

## 2017-01-05 RX ADMIN — ACETAMINOPHEN 1000 MG: 10 INJECTION, SOLUTION INTRAVENOUS at 10:01

## 2017-01-05 RX ADMIN — PHENYLEPHRINE HYDROCHLORIDE 100 MCG: 10 INJECTION INTRAVENOUS at 11:01

## 2017-01-05 RX ADMIN — SODIUM CHLORIDE, SODIUM ACETATE ANHYDROUS, SODIUM GLUCONATE, POTASSIUM CHLORIDE, AND MAGNESIUM CHLORIDE: 526; 222; 502; 37; 30 INJECTION, SOLUTION INTRAVENOUS at 01:01

## 2017-01-05 RX ADMIN — PROPOFOL 200 MG: 10 INJECTION, EMULSION INTRAVENOUS at 11:01

## 2017-01-05 RX ADMIN — SODIUM CHLORIDE: 0.9 INJECTION, SOLUTION INTRAVENOUS at 04:01

## 2017-01-05 RX ADMIN — PHENYTOIN SODIUM 500 MG: 50 INJECTION INTRAMUSCULAR; INTRAVENOUS at 12:01

## 2017-01-05 RX ADMIN — IPRATROPIUM BROMIDE AND ALBUTEROL SULFATE 3 ML: .5; 3 SOLUTION RESPIRATORY (INHALATION) at 09:01

## 2017-01-05 RX ADMIN — PHENYLEPHRINE HYDROCHLORIDE 200 MCG: 10 INJECTION INTRAVENOUS at 12:01

## 2017-01-05 RX ADMIN — LEVETIRACETAM 500 MG: 500 TABLET, FILM COATED ORAL at 08:01

## 2017-01-05 RX ADMIN — ACETAMINOPHEN 650 MG: 325 TABLET ORAL at 06:01

## 2017-01-05 RX ADMIN — PHENYLEPHRINE HYDROCHLORIDE 100 MCG: 10 INJECTION INTRAVENOUS at 12:01

## 2017-01-05 RX ADMIN — REMIFENTANIL HYDROCHLORIDE 0.3 MCG/KG/MIN: 1 INJECTION, POWDER, LYOPHILIZED, FOR SOLUTION INTRAVENOUS at 11:01

## 2017-01-05 RX ADMIN — PROPOFOL 50 MG: 10 INJECTION, EMULSION INTRAVENOUS at 12:01

## 2017-01-05 RX ADMIN — CEFTRIAXONE 2 G: 1 INJECTION, SOLUTION INTRAVENOUS at 12:01

## 2017-01-05 RX ADMIN — DEXAMETHASONE SODIUM PHOSPHATE 12 MG: 4 INJECTION, SOLUTION INTRAMUSCULAR; INTRAVENOUS at 11:01

## 2017-01-05 RX ADMIN — GLYCOPYRROLATE 0.1 MG: 0.2 INJECTION, SOLUTION INTRAMUSCULAR; INTRAVENOUS at 12:01

## 2017-01-05 RX ADMIN — DEXAMETHASONE SODIUM PHOSPHATE 4 MG: 4 INJECTION, SOLUTION INTRAMUSCULAR; INTRAVENOUS at 06:01

## 2017-01-05 RX ADMIN — ROCURONIUM BROMIDE 2 MG: 10 INJECTION, SOLUTION INTRAVENOUS at 11:01

## 2017-01-05 RX ADMIN — LIDOCAINE HYDROCHLORIDE 100 MG: 20 INJECTION, SOLUTION INTRAVENOUS at 11:01

## 2017-01-05 RX ADMIN — CEFTRIAXONE 2 G: 2 INJECTION, SOLUTION INTRAVENOUS at 06:01

## 2017-01-05 RX ADMIN — MIDAZOLAM HYDROCHLORIDE 2 MG: 1 INJECTION, SOLUTION INTRAMUSCULAR; INTRAVENOUS at 10:01

## 2017-01-05 RX ADMIN — ACETAMINOPHEN 1000 MG: 10 INJECTION, SOLUTION INTRAVENOUS at 02:01

## 2017-01-05 RX ADMIN — MANNITOL 50 G: 250 INJECTION, SOLUTION INTRAVENOUS at 01:01

## 2017-01-05 RX ADMIN — PHENYLEPHRINE HYDROCHLORIDE 0.5 MCG/KG/MIN: 10 INJECTION INTRAVENOUS at 11:01

## 2017-01-05 RX ADMIN — SODIUM CHLORIDE, SODIUM ACETATE ANHYDROUS, SODIUM GLUCONATE, POTASSIUM CHLORIDE, AND MAGNESIUM CHLORIDE: 526; 222; 502; 37; 30 INJECTION, SOLUTION INTRAVENOUS at 11:01

## 2017-01-05 RX ADMIN — DEXAMETHASONE SODIUM PHOSPHATE 4 MG: 4 INJECTION, SOLUTION INTRAMUSCULAR; INTRAVENOUS at 05:01

## 2017-01-05 RX ADMIN — SUCCINYLCHOLINE CHLORIDE 200 MG: 20 INJECTION, SOLUTION INTRAMUSCULAR; INTRAVENOUS at 11:01

## 2017-01-05 RX ADMIN — SODIUM CHLORIDE: 0.9 INJECTION, SOLUTION INTRAVENOUS at 05:01

## 2017-01-05 RX ADMIN — PROPOFOL 30 MG: 10 INJECTION, EMULSION INTRAVENOUS at 12:01

## 2017-01-05 RX ADMIN — LEVETIRACETAM 500 MG: 500 TABLET, FILM COATED ORAL at 09:01

## 2017-01-05 NOTE — NURSING TRANSFER
Nursing Transfer Note      1/5/2017     Transfer To: [id number]    Transfer via bed    Transfer with cardiac monitoring    Transported by rn and pct    Medicines sent: n/a    Chart send with patient: Yes    Notified: spouse

## 2017-01-05 NOTE — PLAN OF CARE
01/05/17 1325   Discharge Assessment   Assessment Type Discharge Planning Assessment     Pt in OR today, plan for ICU post op. CM/SW to monitor pt's d/c needs.

## 2017-01-05 NOTE — ANESTHESIA PROCEDURE NOTES
Arterial    Diagnosis: brain mass    Patient location during procedure: done in OR  Procedure start time: 1/5/2017 11:25 AM  Timeout: 1/5/2017 11:25 AM  Procedure end time: 1/5/2017 11:27 AM  Staffing  Anesthesiologist: KAVEH HERNANDEZ  Resident/CRNA: VANESSA MARCIAL  Performed by: resident/CRNA   Anesthesiologist was present at the time of the procedure.  Arterial Line  Skin Prep: chlorhexidine gluconate  Local Infiltration: none  Orientation: left  Location: radial  Catheter Size: 20 G{[facility] ANESTHESIA BLOCK ART PLACEMENTInsertion Attempts: 1  Assessment  Dressing: secured with tape and tegaderm  Patient: Tolerated well

## 2017-01-05 NOTE — PLAN OF CARE
Problem: Patient Care Overview  Goal: Plan of Care Review  Outcome: Ongoing (interventions implemented as appropriate)  POC reviewed with pt and family at the bedside, verbalized understanding. Pt AAOx4, VSS, afebrile. Pt on room air, no complaints of shortness of breathe. Pt on regular diet until NPO at midnight. No complaints of nause/vomiting. No complaints of pain. Pt up ad korey, walks the halls during shift. Neuro checks done every 4 hours. Pt head of bed remains at or above 45 degrees. Pt remains free from falls and injuries, will continue to monitor.

## 2017-01-05 NOTE — PROGRESS NOTES
Progress Note  Neurosurgery    Admit Date: 1/4/2017  Post-operative Day: Day of Surgery  Hospital Day: 2    SUBJECTIVE:     More Fournier is a 36 y.o. female with R occipital mass suspicious for metastasis. MERVIN. Patient and family awaiting surgery today. She denies any HA, changes in vision, focal deficits, or seizures.         Follow-up For:  Procedure(s) (LRB):  CRANIOTOMY WITH STEALTH-for tumor resection, right side (Right)      Scheduled Meds:   dexamethasone  4 mg Intravenous Q6H    levetiracetam  500 mg Oral BID     Continuous Infusions:   sodium chloride 0.9% 100 mL/hr at 01/05/17 0538     PRN Meds:dextrose 50%, dextrose 50%, glucagon (human recombinant), glucose, glucose, glucose, insulin aspart    Review of patient's allergies indicates:  No Known Allergies    OBJECTIVE:     Vital Signs (Most Recent)  Temp: 97 °F (36.1 °C) (01/05/17 0738)  Pulse: 66 (01/05/17 0738)  Resp: 16 (01/05/17 0738)  BP: 123/76 (01/05/17 0738)  SpO2: 97 % (01/05/17 0738)    Vital Signs Range (Last 24H):  Temp:  [96.6 °F (35.9 °C)-98 °F (36.7 °C)]   Pulse:  [66-96]   Resp:  [16-18]   BP: (121-139)/(61-80)   SpO2:  [97 %-100 %]     I & O (Last 24H):  Intake/Output Summary (Last 24 hours) at 01/05/17 0901  Last data filed at 01/05/17 0542   Gross per 24 hour   Intake          1963.33 ml   Output             1250 ml   Net           713.33 ml     Physical Exam:  General: well developed, well nourished. no acute distress.  Neurologic: Awake, alert and oriented x3. Thought content appropriate.  Head: normocephalic, atraumatic   GCS: Motor: 6/Verbal: 5/Eyes: 4 GCS Total: 15  Cranial nerves: face symmetric, tongue midline, pupils equal, round, reactive to light with accomodation, extraocular muscles intact. CN II-XII grossly intact.   Language: no aphasia  Speech: no dysarthria   Sensory: response to light touch throughout  Motor Strength: Moves all extremities spontaneously with good tone. Full strength upper and lower extremities. No  abnormal movements seen.          Pronator Drift: no drift noted  Coordination: finger to nose normal bilaterally  Babb: absent  Clonus: absent  Babinski: absent   Gait: did not assess  No focal numbness or weakness  ENT: normal hearing with finger rub  Heart: RRR, no cyanosis, pallor, or edema.   Lungs:  normal respiratory effort  Abdomen: soft, non-tender and symmetric  Extremities: warm with no cyanosis, edema, or clubbing  Pulses: palpable distal pulses  Skin: warm, dry and intact. No visible rashes or lesions.       Lines/Drains:       Peripheral IV - Single Lumen 01/04/17 0052 Left Antecubital (Active)   Site Assessment Clean;Dry;Intact 1/4/2017  8:11 PM   Line Status Flushed;Infusing 1/4/2017  8:11 PM   Dressing Status Clean;Dry;Intact 1/4/2017  8:11 PM   Dressing Intervention Dressing reinforced 1/4/2017  8:11 PM   Dressing Change Due 01/08/17 1/4/2017  8:11 PM   Site Change Due 01/08/17 1/4/2017  8:11 PM   Reason Not Rotated Not due 1/4/2017  8:11 PM   Number of days:1       Laboratory:  CBC:   Recent Labs  Lab 01/05/17  0403   WBC 9.97   RBC 4.27   HGB 8.3*   HCT 29.2*      MCV 68*   MCH 19.4*   MCHC 28.4*     BMP:   Recent Labs  Lab 01/05/17  0403   *      K 4.3   *   CO2 22*   BUN 8   CREATININE 0.7   CALCIUM 8.6*     Coagulation:   Recent Labs  Lab 01/05/17  0403   INR 1.0   APTT 21.2       Diagnostic Results:  CT C/A/P: personally reviewed and agree with findings  Impression  1. Solitary 0.5 cm pulmonary nodule within the left upper lobe. This is of uncertain clinical significance, however in light of the patient's history, a metastatic lesion is not excluded. Primary neoplasm is felt less likely given the small size. Clinical considerations will determine the schedule for future assessment, however, consider 6-12 and 18-24 month CT chest follow up to assess for stability.    2. Splenomegaly of uncertain etiology. Clinical correlation with patient lab values and history is  advised.    ASSESSMENT/PLAN:     37 yo female with hx of tobacco use with R occipital mass suspicious for metastasis.     -Pt is neurologically intact on exam.  -CT C/A/P reviewed  -Continue dex 4 q6h  -Continue Keppra 500mg BID  -Plan for OR today; consents obtained.      Discussed with Dr. Larkin     Please call with any questions.    Ha Chaves PA-C  Neurosurgery  Pager: [phone]

## 2017-01-05 NOTE — TRANSFER OF CARE
"Anesthesia Transfer of Care Note    Patient: More Fournier    Procedure(s) Performed: Procedure(s):  CRANIOTOMY WITH STEALTH-for tumor resection    Patient location: PACU    Anesthesia Type: general    Transport from OR: Transported from OR on 6-10 L/min O2 by face mask with adequate spontaneous ventilation. Transported from OR on 100% O2 by closed face mask with adequate spontaneous ventilation. Continuous SpO2 monitoring in transport    Post pain: adequate analgesia    Post assessment: no apparent anesthetic complications    Post vital signs: stable    Level of consciousness: awake    Nausea/Vomiting: no nausea/vomiting    Complications: none          Last vitals:   Visit Vitals    /62 (BP Location: Right arm, Patient Position: Lying, BP Method: Automatic)    Pulse 68    Temp 35.6 °C (96.1 °F) (Axillary)    Resp 20    Ht 5' 3" (1.6 m)    Wt 86.2 kg (190 lb)    LMP 01/04/2017 (Exact Date)    SpO2 100%    Breastfeeding No    BMI 33.66 kg/m2     "

## 2017-01-05 NOTE — H&P
History & Physical  Neurocritical Care    Admit Date: 1/4/2017  LOS: 1    Code Status: Full Code     CC: Brain tumor    SUBJECTIVE:     History of Present Illness: Ms. More Fournier is a 36 year old female with a PMHx of anemia, smoker, and possible hypertension who presents to Neuro Critical care s/p Right temporoparietal craniotomy for a Right occipital mass suspicious for metastasis.Per the chart, patient has experienced formed visual images with color in left visual field a few weeks ago. She also developed intermittent severe headache which progressed and seen at [facility] in [place] where CT showed right temporooccipital mass. MRI  shows enhancing mass just underneath atrium of right lateral ventricle. Associated edema. CT chest and abdomen shows tiny peripheral left lung nodule. Patient will be admitted to Neuro Critical Care for higher level of care.     Past Medical History   Diagnosis Date    Anemia     Prolapsed uterus      Past Surgical History   Procedure Laterality Date    Tubal ligation       No current facility-administered medications on file prior to encounter.      No current outpatient prescriptions on file prior to encounter.     Review of patient's allergies indicates:  No Known Allergies  History reviewed. No pertinent family history.  Social History   Substance Use Topics    Smoking status: Current Every Day Smoker    Smokeless tobacco: None    Alcohol use Yes      Comment: occasional      Review of symptoms  Constitutional: Denies fevers or chills. + Pain at site of IV  Pulmonary: Denies shortness of breath or cough.  Cardiology: Denies chest pain or palpitations.  GI: Denies abdominal pain or constipation.  Neurologic: Denies new weakness,  Slight headache, or paresthesias.    OBJECTIVE:   Vital Signs (Most Recent):   Temp: 98.4 °F (36.9 °C) (01/05/17 1715)  Pulse: 77 (01/05/17 1715)  Resp: 20 (01/05/17 1715)  BP: (!) 142/75 (01/05/17 1715)  SpO2: 98 % (01/05/17 1715)    Vital  Signs (24h Range):   Temp:  [96.1 °F (35.6 °C)-98.4 °F (36.9 °C)] 98.4 °F (36.9 °C)  Pulse:  [] 77  Resp:  [16-30] 20  BP: (109-147)/(62-87) 142/75  Arterial Line BP: (144-175)/(24-88) 144/24    ICP/CPP (Last 24h):        I & O (Last 24h):    Intake/Output Summary (Last 24 hours) at 01/05/17 1732  Last data filed at 01/05/17 1710   Gross per 24 hour   Intake          3813.33 ml   Output             3500 ml   Net           313.33 ml     Physical Exam:  GA: Awake, Alert, comfortable, no acute distress.   HEENT: No scleral icterus or JVD.   Pulmonary: Clear to auscultation Anterior.   Cardiac: RRR S1 & S2 w/o rubs/murmurs/gallops.   Abdominal: Bowel sounds present x 4.  Neuro:  --GCS: E4 V5 M6  --Mental Status:  Awake, alert, oriented X 4  --CN II-XII grossly intact.   --Pupils 4mm, PERRL.   --Corneal reflex, gag, cough intact.  --LUE strength: 5/5  --RUE strength: 5/5  --LLE strength: 5/5  --RLE strength: 5/5       Lines/Drains/Airway:            Arterial Line 01/05/17 1125 Left Radial (Active)   Site Assessment Clean;Dry;Intact;No redness;No swelling 1/5/2017  4:00 PM   Line Status Pulsatile blood flow 1/5/2017  4:00 PM   Art Line Waveform Appropriate 1/5/2017  4:00 PM   Arterial Line Interventions Zeroed and calibrated;Leveled 1/5/2017  4:00 PM           Urethral Catheter 01/05/17 1130 Straight-tip;Non-latex 16 Fr. (Active)   Site Assessment Clean;Intact 1/5/2017  4:00 PM   Collection Container Standard drainage bag 1/5/2017  4:00 PM   Securement Method secured to upper leg w/ adhesive device 1/5/2017  4:00 PM   Output (mL) 150 mL 1/5/2017  5:10 PM            Drain/Device  01/05/17 1440 Right posterior other (see comments) evacuation tube (Active)   Insertion Site clean and dry 1/5/2017  4:00 PM   Drainage Characteristics/Odor serosanguineous 1/5/2017  4:00 PM   Drainage Amount small 1/5/2017  4:00 PM   General Output (mL) 25 1/5/2017  4:32 PM     Nutrition/Tube Feeds:   Current Diet Order   Procedures     Diet clear liquid       Labs:  ABG: No results for input(s): PH, PO2, PCO2, HCO3, POCSATURATED, BE in the last 24 hours.  BMP:  Recent Labs  Lab 01/05/17  0403      K 4.3   *   CO2 22*   BUN 8   CREATININE 0.7   *     LFT: Lab Results   Component Value Date    AST 22 01/04/2017    ALT 22 01/04/2017    ALKPHOS 82 01/04/2017    BILITOT 0.4 01/04/2017    ALBUMIN 3.7 01/04/2017    PROT 8.4 01/04/2017     CBC:   Lab Results   Component Value Date    WBC 9.97 01/05/2017    HGB 8.3 (L) 01/05/2017    HCT 29.2 (L) 01/05/2017    MCV 68 (L) 01/05/2017     01/05/2017     Microbiology x 7d:   Microbiology Results (last 7 days)     ** No results found for the last 168 hours. **        Imaging:  Imaging Results         CT Chest With Contrast (Final result) Result time:  01/04/17 05:36:13    Final result by Howard Bland MD (01/04/17 05:36:13)    Impression:        1. Solitary 0.5 cm pulmonary nodule within the left upper lobe. This is of uncertain clinical significance, however in light of the patient's history, a metastatic lesion is not excluded. Primary neoplasm is felt less likely given the small size. Clinical considerations will determine the schedule for future assessment, however, consider 6-12 and 18-24 month CT chest follow up to assess for stability.    2. Splenomegaly of uncertain etiology. Clinical correlation with patient lab values and history is advised.      ______________________________________     Electronically signed by resident: HOWARD BLAND MD  Date:     01/04/17  Time:    05:11            As the supervising and teaching physician, I personally reviewed the images and resident's interpretation and I agree with the findings.          Electronically signed by: MITZI CARRASQUILLO  Date:     01/04/17  Time:    05:36     Narrative:    Procedure comments: The patient was surveyed from the lung apices through the pelvis after the administration of 100 cc Omni 350 IV contrast as well as  oral contrast and data was reconstructed for coronal, sagittal, and axial images.    Comparison: None    Findings:    Examination of the vascular and soft tissue structures at the base of the neck is unremarkable.    A left sided aortic arch with 3 branch vessels is identified.  The thoracic aorta maintains normal caliber, contour, and course without significant atherosclerotic calcification within its course.  The heart is not enlarged and there is no evidence of pericardial effusion.    The trachea is midline, the proximal airways are patent, and the lungs are symmetrically expanded.  Examination of the lung fields demonstrates no evidence of focal airspace consolidation or pneumothorax.  There is a 0.5 cm soft tissue pulmonary nodule within the posterior segment of the left upper lobe (axial series 2 image 25). No definite additional nodules are identified. There is mild bibasilar atelectasis present. There is no significant pleural effusion    There is no axillary, mediastinal, or hilar lymphadenopathy.    The esophagus maintains a normal course and caliber.     The liver is normal in size and attenuation with no focal hepatic abnormality.  The gallbladder shows no evidence of stones or pericholecystic fluid.  There is no intra-or extrahepatic biliary ductal dilatation.    The stomach, pancreas, and adrenal glands are unremarkable.  The spleen is enlarged.    The kidneys are normal in size and location.  There is no evidence of hydronephrosis.  The ureters appear normal in course and caliber without evidence of ureteral dilatation. The urinary bladder demonstrates no significant abnormality. Uterus appears within normal limits. There is a 1.0 cm left adnexal hypodensity likely representing a prominent follicle or cyst. There is no significant free fluid present within the pelvis.    The visualized loops of small and large bowel show no evidence of obstruction or inflammation. The appendix is not definitely  visualized and may be surgically absent.      There is no ascites, free fluid, or intraperitoneal free air.     There is no evidence of lymph node enlargement in the abdomen or pelvis.    The abdominal aorta is normal in course and caliber without significant atherosclerotic calcifications.    The osseus structures demonstrate age-appropriate degenerative change without evidence of acute fracture. No definite aggressive osseous destructive lesions are appreciated.      The extraperitoneal soft tissues are unremarkable.            CT Abdomen Pelvis With Contrast (Final result) Result time:  01/04/17 05:36:14    Final result by Howard Bland MD (01/04/17 05:36:14)    Impression:        1. Solitary 0.5 cm pulmonary nodule within the left upper lobe. This is of uncertain clinical significance, however in light of the patient's history, a metastatic lesion is not excluded. Primary neoplasm is felt less likely given the small size. Clinical considerations will determine the schedule for future assessment, however, consider 6-12 and 18-24 month CT chest follow up to assess for stability.    2. Splenomegaly of uncertain etiology. Clinical correlation with patient lab values and history is advised.      ______________________________________     Electronically signed by resident: HOWARD BLAND MD  Date:     01/04/17  Time:    05:11            As the supervising and teaching physician, I personally reviewed the images and resident's interpretation and I agree with the findings.          Electronically signed by: MITZI CARRASQUILLO  Date:     01/04/17  Time:    05:36     Narrative:    Procedure comments: The patient was surveyed from the lung apices through the pelvis after the administration of 100 cc Omni 350 IV contrast as well as oral contrast and data was reconstructed for coronal, sagittal, and axial images.    Comparison: None    Findings:    Examination of the vascular and soft tissue structures at the base of the neck  is unremarkable.    A left sided aortic arch with 3 branch vessels is identified.  The thoracic aorta maintains normal caliber, contour, and course without significant atherosclerotic calcification within its course.  The heart is not enlarged and there is no evidence of pericardial effusion.    The trachea is midline, the proximal airways are patent, and the lungs are symmetrically expanded.  Examination of the lung fields demonstrates no evidence of focal airspace consolidation or pneumothorax.  There is a 0.5 cm soft tissue pulmonary nodule within the posterior segment of the left upper lobe (axial series 2 image 25). No definite additional nodules are identified. There is mild bibasilar atelectasis present. There is no significant pleural effusion    There is no axillary, mediastinal, or hilar lymphadenopathy.    The esophagus maintains a normal course and caliber.     The liver is normal in size and attenuation with no focal hepatic abnormality.  The gallbladder shows no evidence of stones or pericholecystic fluid.  There is no intra-or extrahepatic biliary ductal dilatation.    The stomach, pancreas, and adrenal glands are unremarkable.  The spleen is enlarged.    The kidneys are normal in size and location.  There is no evidence of hydronephrosis.  The ureters appear normal in course and caliber without evidence of ureteral dilatation. The urinary bladder demonstrates no significant abnormality. Uterus appears within normal limits. There is a 1.0 cm left adnexal hypodensity likely representing a prominent follicle or cyst. There is no significant free fluid present within the pelvis.    The visualized loops of small and large bowel show no evidence of obstruction or inflammation. The appendix is not definitely visualized and may be surgically absent.      There is no ascites, free fluid, or intraperitoneal free air.     There is no evidence of lymph node enlargement in the abdomen or pelvis.    The abdominal  aorta is normal in course and caliber without significant atherosclerotic calcifications.    The osseus structures demonstrate age-appropriate degenerative change without evidence of acute fracture. No definite aggressive osseous destructive lesions are appreciated.      The extraperitoneal soft tissues are unremarkable.            MRI Brain Stealth without Fudicials (In process)         MRI Brain W WO Contrast (Final result) Result time:  01/04/17 02:52:38    Final result by Howard Bland MD (01/04/17 02:52:38)    Impression:       Heterogeneous enhancing 2.9 x 2.4 cm mass in the right occipital lobe with hemorrhagic components and extensive vasogenic edema.  Diagnostic considerations would include metastatic disease (melanoma or breast) with primary glial tumor such as GBM thought less likely.  Lymphoma can have a similar appearance.  Suggest correlation with patient's immune status.    Mass effect resulting in sulcal effacement, effacement of the occipital horn of the right lateral ventricle and 0.8 cm leftward midline shift.  No evidence of herniation or hydrocephalus.  ______________________________________     Electronically signed by resident: HOWARD BLAND MD  Date:     01/04/17  Time:    02:03            As the supervising and teaching physician, I personally reviewed the images and resident's interpretation and I agree with the findings.          Electronically signed by: SALLY DODD MD  Date:     01/04/17  Time:    02:52     Narrative:    MRI OF THE BRAIN WITHOUT AND WITH CONTRAST     INDICATION: Headache    TECHNIQUE: Precontrast sagittal and axial T1, axial T2, and axial FLAIR and diffusion weighted images of the brain were acquired. 10 cc of Gadavist was injected intravenously and post gadolinium axial and coronal T1-weighted images obtained.      COMPARISON: None     FINDINGS:  There is no diffusion abnormality to indicate recent cerebral infarction.  The intracranial flow voids are within normal  limits.  There are no extra-axial fluid collections. The pituitary gland and craniocervical junction are unremarkable.      There is an enhancing 2.9 x 2.4 cm (greatest axial dimension), favored to be intra-axial, within the anterior parasagittal right occipital lobe which demonstrates an heterogeneous T1 and T2/flair signal intensity.  Its medial margin is irregular.  There are scattered foci of gradient susceptibility, suggestive of microhemorrhage within the mass.  There is increased T2/flair signal throughout the surrounding white matter, reflecting vasogenic edema.  There is adjacent mass effect, or sulcal effacement and effacement of the occipital horn of the right lateral ventricle.  There is 0.8 cm of leftward midline shift.  There is no evidence of herniation.  There is no evidence of developing hydrocephalus.        There is patchy opacification of the right maxillary sinus.  The remaining paranasal sinuses and mastoid air cells appear clear.  There is suggestion of nodular appearance of the bilateral lacrimal glands, which may be a normal variant.  Orbits appear unremarkable. The soft tissues appear unremarkable.              I personally reviewed the above image.    ASSESSMENT/PLAN:     Patient Active Problem List    Diagnosis Date Noted    Brain tumor 01/04/2017     Neuro:   POD # 0 s/p Right Temporoparietal Crani for Right Occipital Mass suspicious for Mets   --Continue Neuro checks q 1hr  -- Neurosurgery continues to follow  -- Continue Dexamethasone 4 q 6  -- SBP goal < 160  --PT/OT/Speech  -- Pending further recommendations via NGSY    Seizure Prophylaxis  -- Keppra 500 mg BID     Pulmonary:   -- Daily CXR  -- Daily ABGs   -- Continue Duo Nebs    Cardiac:   Hypertension  -- Continue to monitor HR and BP   --SBP goal < 160  -- 2D echo pending    Renal:   --Continue to monitor I/O  --Continue to monitor BUN/Cr  -- BUN 8; Creatinine 0.7    ID:   -- Afebrile   -- No leukocytosis  -- Continue Cetriaxone      Hem/Onc:   Normocytic Anemia  --continue to monitor H/H  -- likely dilutional     Endocrine:   --Continue to monitor BG      Fluids/Electrolytes/Nutrition/GI:   Hypocalcemia   -- Continue to monitor and replace electrolytes    PPX   -PUD: Pantoprazole 40 mg daily  -DVT: :TCD, SCD    Uninterrupted Critical Care/Counseling Time (not including procedures):  >50 min    Vanna Bejarano PA-C  Neurocritical Care  [id number]

## 2017-01-05 NOTE — BRIEF OP NOTE
"[facility]  Brief Operative Note    SUMMARY     Surgery Date: 1/5/2017     Surgeon(s) and Role:     * Sudhir Larkin MD - Primary     * Odilia Elliott MD - Resident - Assisting        Pre-op Diagnosis:  Brain tumor [D49.6]    Post-op Diagnosis:  Post-Op Diagnosis Codes:     * Brain tumor [D49.6]    Procedure: Right temporoparietal craniotomy, excision of brain tumor with neuronavigation and microsugery.    Anesthesia: General    Description of Procedure: Low temporal opening close to transverse sinus, navigation guided resection.    Description of the findings of the procedure: Black, soft necrotic tumor  "melanin containing" by frozen section.    Estimated Blood Loss: 175 mL         Specimens:   Specimen (12h ago through future)    Start     Ordered    01/05/17 1420  Specimen to Pathology - Surgery  Once     Comments:  1) right occipital tumor-- Frozen  2) right occipital tumor -- Permanent  2) right occipital tumor -- Permanent    01/05/17 1421        "

## 2017-01-06 LAB
ANION GAP SERPL CALC-SCNC: 8 MMOL/L
ANISOCYTOSIS BLD QL SMEAR: SLIGHT
ANISOCYTOSIS BLD QL SMEAR: SLIGHT
BASOPHILS # BLD AUTO: 0 K/UL
BASOPHILS # BLD AUTO: 0 K/UL
BASOPHILS NFR BLD: 0 %
BASOPHILS NFR BLD: 0 %
BUN SERPL-MCNC: 9 MG/DL
CALCIUM SERPL-MCNC: 8.3 MG/DL
CHLORIDE SERPL-SCNC: 110 MMOL/L
CO2 SERPL-SCNC: 22 MMOL/L
CREAT SERPL-MCNC: 0.8 MG/DL
DIFFERENTIAL METHOD: ABNORMAL
DIFFERENTIAL METHOD: ABNORMAL
EOSINOPHIL # BLD AUTO: 0 K/UL
EOSINOPHIL # BLD AUTO: 0 K/UL
EOSINOPHIL NFR BLD: 0 %
EOSINOPHIL NFR BLD: 0 %
ERYTHROCYTE [DISTWIDTH] IN BLOOD BY AUTOMATED COUNT: 20.1 %
ERYTHROCYTE [DISTWIDTH] IN BLOOD BY AUTOMATED COUNT: 20.2 %
EST. GFR  (AFRICAN AMERICAN): >60 ML/MIN/1.73 M^2
EST. GFR  (NON AFRICAN AMERICAN): >60 ML/MIN/1.73 M^2
FERRITIN SERPL-MCNC: 9 NG/ML
GIANT PLATELETS BLD QL SMEAR: PRESENT
GIANT PLATELETS BLD QL SMEAR: PRESENT
GLUCOSE SERPL-MCNC: 115 MG/DL
GLUCOSE SERPL-MCNC: 80 MG/DL (ref 70–110)
HCO3 UR-SCNC: 14.3 MMOL/L (ref 24–28)
HCT VFR BLD AUTO: 25.6 %
HCT VFR BLD AUTO: 27 %
HCT VFR BLD CALC: 21 %PCV (ref 36–54)
HGB BLD-MCNC: 7.3 G/DL
HGB BLD-MCNC: 7.7 G/DL
HYPOCHROMIA BLD QL SMEAR: ABNORMAL
HYPOCHROMIA BLD QL SMEAR: ABNORMAL
IRON SERPL-MCNC: 10 UG/DL
LYMPHOCYTES # BLD AUTO: 1.5 K/UL
LYMPHOCYTES # BLD AUTO: 1.5 K/UL
LYMPHOCYTES NFR BLD: 15.9 %
LYMPHOCYTES NFR BLD: 17 %
MAGNESIUM SERPL-MCNC: 2 MG/DL
MCH RBC QN AUTO: 19.4 PG
MCH RBC QN AUTO: 19.5 PG
MCHC RBC AUTO-ENTMCNC: 28.5 %
MCHC RBC AUTO-ENTMCNC: 28.5 %
MCV RBC AUTO: 68 FL
MCV RBC AUTO: 68 FL
MONOCYTES # BLD AUTO: 0.3 K/UL
MONOCYTES # BLD AUTO: 0.4 K/UL
MONOCYTES NFR BLD: 3 %
MONOCYTES NFR BLD: 4.4 %
NEUTROPHILS # BLD AUTO: 7.2 K/UL
NEUTROPHILS # BLD AUTO: 7.3 K/UL
NEUTROPHILS NFR BLD: 79.7 %
NEUTROPHILS NFR BLD: 79.9 %
OVALOCYTES BLD QL SMEAR: ABNORMAL
PCO2 BLDA: 24.1 MMHG (ref 35–45)
PH SMN: 7.38 [PH] (ref 7.35–7.45)
PHOSPHATE SERPL-MCNC: 3.2 MG/DL
PLATELET # BLD AUTO: 121 K/UL
PLATELET # BLD AUTO: 123 K/UL
PLATELET BLD QL SMEAR: ABNORMAL
PLATELET BLD QL SMEAR: ABNORMAL
PMV BLD AUTO: ABNORMAL FL
PMV BLD AUTO: ABNORMAL FL
PO2 BLDA: 116 MMHG (ref 80–100)
POC BE: -11 MMOL/L
POC IONIZED CALCIUM: 0.91 MMOL/L (ref 1.06–1.42)
POC SATURATED O2: 99 % (ref 95–100)
POC TCO2: 15 MMOL/L (ref 23–27)
POIKILOCYTOSIS BLD QL SMEAR: SLIGHT
POLYCHROMASIA BLD QL SMEAR: ABNORMAL
POLYCHROMASIA BLD QL SMEAR: ABNORMAL
POTASSIUM BLD-SCNC: 3.1 MMOL/L (ref 3.5–5.1)
POTASSIUM SERPL-SCNC: 3.9 MMOL/L
RBC # BLD AUTO: 3.77 M/UL
RBC # BLD AUTO: 3.95 M/UL
SAMPLE: ABNORMAL
SATURATED IRON: 2 %
SODIUM BLD-SCNC: 145 MMOL/L (ref 136–145)
SODIUM SERPL-SCNC: 140 MMOL/L
TOTAL IRON BINDING CAPACITY: 564 UG/DL
TRANSFERRIN SERPL-MCNC: 381 MG/DL
TRANSFERRIN SERPL-MCNC: 381 MG/DL
WBC # BLD AUTO: 8.98 K/UL
WBC # BLD AUTO: 9.22 K/UL

## 2017-01-06 PROCEDURE — 82728 ASSAY OF FERRITIN: CPT

## 2017-01-06 PROCEDURE — 99232 SBSQ HOSP IP/OBS MODERATE 35: CPT | Mod: ,,, | Performed by: PHYSICIAN ASSISTANT

## 2017-01-06 PROCEDURE — 94640 AIRWAY INHALATION TREATMENT: CPT

## 2017-01-06 PROCEDURE — 83735 ASSAY OF MAGNESIUM: CPT

## 2017-01-06 PROCEDURE — 94668 MNPJ CHEST WALL SBSQ: CPT

## 2017-01-06 PROCEDURE — 94760 N-INVAS EAR/PLS OXIMETRY 1: CPT

## 2017-01-06 PROCEDURE — C9113 INJ PANTOPRAZOLE SODIUM, VIA: HCPCS | Performed by: NEUROLOGICAL SURGERY

## 2017-01-06 PROCEDURE — 80048 BASIC METABOLIC PNL TOTAL CA: CPT

## 2017-01-06 PROCEDURE — 25000003 PHARM REV CODE 250: Performed by: STUDENT IN AN ORGANIZED HEALTH CARE EDUCATION/TRAINING PROGRAM

## 2017-01-06 PROCEDURE — 63600175 PHARM REV CODE 636 W HCPCS: Performed by: STUDENT IN AN ORGANIZED HEALTH CARE EDUCATION/TRAINING PROGRAM

## 2017-01-06 PROCEDURE — 84100 ASSAY OF PHOSPHORUS: CPT

## 2017-01-06 PROCEDURE — 63600175 PHARM REV CODE 636 W HCPCS: Performed by: PHYSICIAN ASSISTANT

## 2017-01-06 PROCEDURE — 25000242 PHARM REV CODE 250 ALT 637 W/ HCPCS: Performed by: NEUROLOGICAL SURGERY

## 2017-01-06 PROCEDURE — 97161 PT EVAL LOW COMPLEX 20 MIN: CPT

## 2017-01-06 PROCEDURE — 20600001 HC STEP DOWN PRIVATE ROOM

## 2017-01-06 PROCEDURE — 25000003 PHARM REV CODE 250: Performed by: PHYSICIAN ASSISTANT

## 2017-01-06 PROCEDURE — 36415 COLL VENOUS BLD VENIPUNCTURE: CPT

## 2017-01-06 PROCEDURE — 97165 OT EVAL LOW COMPLEX 30 MIN: CPT

## 2017-01-06 PROCEDURE — 63600175 PHARM REV CODE 636 W HCPCS: Performed by: NEUROLOGICAL SURGERY

## 2017-01-06 PROCEDURE — 83540 ASSAY OF IRON: CPT

## 2017-01-06 PROCEDURE — 85025 COMPLETE CBC W/AUTO DIFF WBC: CPT | Mod: 91

## 2017-01-06 PROCEDURE — 99900035 HC TECH TIME PER 15 MIN (STAT)

## 2017-01-06 RX ORDER — HYDROCODONE BITARTRATE AND ACETAMINOPHEN 5; 325 MG/1; MG/1
1 TABLET ORAL EVERY 6 HOURS PRN
Status: DISCONTINUED | OUTPATIENT
Start: 2017-01-06 | End: 2017-01-08 | Stop reason: HOSPADM

## 2017-01-06 RX ORDER — HEPARIN SODIUM 5000 [USP'U]/ML
5000 INJECTION, SOLUTION INTRAVENOUS; SUBCUTANEOUS EVERY 8 HOURS
Status: DISCONTINUED | OUTPATIENT
Start: 2017-01-07 | End: 2017-01-08 | Stop reason: HOSPADM

## 2017-01-06 RX ORDER — PANTOPRAZOLE SODIUM 40 MG/1
40 TABLET, DELAYED RELEASE ORAL DAILY
Status: DISCONTINUED | OUTPATIENT
Start: 2017-01-07 | End: 2017-01-08 | Stop reason: HOSPADM

## 2017-01-06 RX ORDER — DEXAMETHASONE 4 MG/1
4 TABLET ORAL EVERY 6 HOURS
Status: DISCONTINUED | OUTPATIENT
Start: 2017-01-06 | End: 2017-01-08

## 2017-01-06 RX ORDER — FERROUS SULFATE 325(65) MG
325 TABLET, DELAYED RELEASE (ENTERIC COATED) ORAL DAILY
Status: DISCONTINUED | OUTPATIENT
Start: 2017-01-06 | End: 2017-01-08 | Stop reason: HOSPADM

## 2017-01-06 RX ADMIN — LEVETIRACETAM 500 MG: 500 TABLET, FILM COATED ORAL at 09:01

## 2017-01-06 RX ADMIN — IPRATROPIUM BROMIDE AND ALBUTEROL SULFATE 3 ML: .5; 3 SOLUTION RESPIRATORY (INHALATION) at 04:01

## 2017-01-06 RX ADMIN — HYDROCODONE BITARTRATE AND ACETAMINOPHEN 1 TABLET: 5; 325 TABLET ORAL at 12:01

## 2017-01-06 RX ADMIN — IPRATROPIUM BROMIDE AND ALBUTEROL SULFATE 3 ML: .5; 3 SOLUTION RESPIRATORY (INHALATION) at 08:01

## 2017-01-06 RX ADMIN — DEXAMETHASONE 4 MG: 4 TABLET ORAL at 05:01

## 2017-01-06 RX ADMIN — FERROUS SULFATE TAB EC 325 MG (65 MG FE EQUIVALENT) 325 MG: 325 (65 FE) TABLET DELAYED RESPONSE at 12:01

## 2017-01-06 RX ADMIN — DEXAMETHASONE SODIUM PHOSPHATE 4 MG: 4 INJECTION, SOLUTION INTRAMUSCULAR; INTRAVENOUS at 12:01

## 2017-01-06 RX ADMIN — DEXAMETHASONE SODIUM PHOSPHATE 4 MG: 4 INJECTION, SOLUTION INTRAMUSCULAR; INTRAVENOUS at 05:01

## 2017-01-06 RX ADMIN — CEFTRIAXONE 2 G: 2 INJECTION, SOLUTION INTRAVENOUS at 12:01

## 2017-01-06 RX ADMIN — PANTOPRAZOLE SODIUM 40 MG: 40 INJECTION, POWDER, FOR SOLUTION INTRAVENOUS at 07:01

## 2017-01-06 RX ADMIN — HYDROCODONE BITARTRATE AND ACETAMINOPHEN 1 TABLET: 5; 325 TABLET ORAL at 09:01

## 2017-01-06 RX ADMIN — IPRATROPIUM BROMIDE AND ALBUTEROL SULFATE 3 ML: .5; 3 SOLUTION RESPIRATORY (INHALATION) at 09:01

## 2017-01-06 RX ADMIN — DEXAMETHASONE 4 MG: 4 TABLET ORAL at 12:01

## 2017-01-06 RX ADMIN — IPRATROPIUM BROMIDE AND ALBUTEROL SULFATE 3 ML: .5; 3 SOLUTION RESPIRATORY (INHALATION) at 03:01

## 2017-01-06 RX ADMIN — IPRATROPIUM BROMIDE AND ALBUTEROL SULFATE 3 ML: .5; 3 SOLUTION RESPIRATORY (INHALATION) at 12:01

## 2017-01-06 NOTE — PT/OT/SLP EVAL
Physical Therapy  Evaluation/Discharge Summary    More Fournier   MRN: [id number]   Admitting Diagnosis: Brain tumor    PT Received On: 17  PT Start Time: 1030     PT Stop Time: 1050    PT Total Time (min): 20 min       Billable Minutes:  Evaluation 20    Diagnosis: Brain tumor  S/p craniotomy (R) temporoparietal    Past Medical History   Diagnosis Date    Anemia     Prolapsed uterus       Past Surgical History   Procedure Laterality Date    Tubal ligation         Referring physician: Odilia Elliott  Date referred to PT: 2017    General Precautions: Standard, fall  Orthopedic Precautions: N/A   Braces: N/A            Patient History:  Lives With: child(lourdes), dependent, spouse  Living Arrangements: mobile home  Home Accessibility: stairs to enter home  Home Layout: Able to live on 1st floor  Number of Stairs to Enter Home: 4  Stair Railings at Home: outside, present at both sides  Transportation Available: family or friend will provide  Living Environment Comment: Pt reports living with spouse and children in mobile home c/ 4 LUIZA. (I) with mobility and self care PTA. Driving but not working.   Equipment Currently Used at Home: none  DME owned (not currently used): none    Previous Level of Function:  Ambulation Skills: independent  Transfer Skills: independent  ADL Skills: independent  Work/Leisure Activity: independent    Subjective:  Communicated with nsg prior to session.    Chief Complaint: headache; wanting to move  Patient goals: to return home     Pain Ratin/10         Location: head  Pain Addressed: Distraction, Reposition, Nurse notified  Pain Rating Post-Intervention: 0/10    Pt verbalized agreement to treatment session. Excited to perform activity.     Objective:   Patient found with: blood pressure cuff, pulse ox (continuous), telemetry; found supine in bed c/ family at bedside     Cognitive Exam:  Oriented to: Person, Place, Time and Situation    Follows Commands/attention: Follows multistep   commands  Communication: clear/fluent  Safety awareness/insight to disability: intact    Physical Exam:  Postural examination/scapula alignment: No postural abnormalities identified    Skin integrity: Visible skin intact  Edema: None noted in BLE    Sensation:   Intact    Lower Extremity Range of Motion:  Right Lower Extremity: WFL  Left Lower Extremity: WFL    Lower Extremity Strength:  Right Lower Extremity: WFL  Left Lower Extremity: WFL     Fine motor coordination:  Intact    Gross motor coordination: WFL    Functional Mobility:  Bed Mobility:  Supine to Sit: Independent  Sit to Supine: Independent    Transfers:  Sit <> Stand Assistance: Modified Independent (x2 trials; slowed)    Gait:   Gait Distance: 150 ft; trace gait deviations denoted; performed safely  Assistance 1: Modified Independent  Gait Assistive Device: No device  Gait Deviation(s): decreased gwendolyn    Stairs:  Pt amb 1 flight (14 Stairs) with Supervision (A), reciprocal gait pattern ascending and nonreciprocal gait pattern descending.     Balance:   Static Sit: NORMAL: No deviations seen in posture held statically  Dynamic Sit: NORMAL: No deviations seen in posture held dynamically  Static Stand: GOOD+: Takes MAXIMAL challenges from all directions  Dynamic stand: GOOD+: Independent gait (with or without assistive device)    Therapeutic Activities and Exercises:  Educated pt on role of PT, PT POC, and discharge from acute PT services. Pt verbalized agreement. Educated to maintain mobility once transferred to step down floor with nsg, PCT, and family with nsg notification.     EDUCATION  Pt educated pt on incr OOB activity including sitting in bedside chair majority of day and amb with nsg and PCT.   Educated pt on being appropriate to transfer with nsg and PCT  Updated white board with appropriate PT information; notified nsg   Pt verbalized agreement.       AM-PAC 6 CLICK MOBILITY  How much help from another person does this patient currently  need?   1 = Unable, Total/Dependent Assistance  2 = A lot, Maximum/Moderate Assistance  3 = A little, Minimum/Contact Guard/Supervision  4 = None, Modified [place]/Independent    Turning over in bed (including adjusting bedclothes, sheets and blankets)?: 4  Sitting down on and standing up from a chair with arms (e.g., wheelchair, bedside commode, etc.): 4  Moving from lying on back to sitting on the side of the bed?: 4  Moving to and from a bed to a chair (including a wheelchair)?: 4  Need to walk in hospital room?: 4  Climbing 3-5 steps with a railing?: 3  Total Score: 23     AM-PAC Raw Score CMS G-Code Modifier Level of Impairment Assistance   6 % Total / Unable   7 - 9 CM 80 - 100% Maximal Assist   10 - 14 CL 60 - 80% Moderate Assist   15 - 19 CK 40 - 60% Moderate Assist   20 - 22 CJ 20 - 40% Minimal Assist   23 CI 1-20% SBA / CGA   24 CH 0% Independent/ Mod I     Patient left supine with all lines intact, call button in reach and nsg and family present.    Assessment:   More Fournier is a 36 y.o. female with a medical diagnosis of Brain tumor and s/p craniotomy presents with impaired endurance but able to perform all aspects of mobility safely in today's session. Trace imbalance denoted with performing stairs. Family able to assist upon discharge and no home barriers identified. No further acute or postacute needs. Discharged from acute PT services on today.     Rehab identified problem list/impairments: Rehab identified problem list/impairments: impaired endurance    Rehab potential is excellent.    Activity tolerance: Good    Discharge recommendations: Discharge Facility/Level Of Care Needs: home     Barriers to discharge: Barriers to Discharge: None    Equipment recommendations: Equipment Needed After Discharge: none     GOALS:   Physical Therapy Goals        Problem: Physical Therapy Goal    Goal Priority Disciplines Outcome Goal Variances Interventions   Physical Therapy Goal     PT/OT, PT  Ongoing (interventions implemented as appropriate)               PLAN:    Discharged from acute PT services.    Plan of Care reviewed with: patient          Safia Fitzgerald, PT, DPT  01/06/2017

## 2017-01-06 NOTE — PLAN OF CARE
Problem: Physical Therapy Goal  Goal: Physical Therapy Goal  Outcome: Ongoing (interventions implemented as appropriate)  PT eval completed. No further acute or postacute PT needs identified. Discharged home c/no needs.     Safia Fitzgerald, PT, DPT  1/6/2017

## 2017-01-06 NOTE — NURSING
Pt arrived to unit.  RN x 3 at bedside.  VS stable.  NCC team notified.  Will continue to monitor.

## 2017-01-06 NOTE — ANESTHESIA POSTPROCEDURE EVALUATION
"Anesthesia Post Evaluation    Patient: More Fournier    Procedure(s) Performed: Procedure(s):  CRANIOTOMY WITH STEALTH-for tumor resection    Final Anesthesia Type: general  Patient location during evaluation: PACU  Patient participation: Yes- Able to Participate  Level of consciousness: awake and alert  Post-procedure vital signs: reviewed and stable  Pain management: adequate  Airway patency: patent  PONV status at discharge: No PONV  Anesthetic complications: no      Cardiovascular status: blood pressure returned to baseline  Respiratory status: face mask  Hydration status: euvolemic  Follow-up not needed.        Visit Vitals    BP (!) 142/69 (BP Location: Right arm, Patient Position: Lying, BP Method: Automatic)    Pulse 72    Temp 36.6 °C (97.9 °F) (Oral)    Resp 19    Ht 5' 3" (1.6 m)    Wt 86.2 kg (190 lb)    LMP 01/04/2017 (Exact Date)    SpO2 97%    Breastfeeding No    BMI 33.66 kg/m2       Pain/Marcella Score: Pain Assessment Performed: Yes (1/6/2017  7:01 AM)  Presence of Pain: complains of pain/discomfort (1/6/2017  7:01 AM)  Pain Rating Prior to Med Admin: 4 (1/5/2017 10:00 PM)  Pain Rating Post Med Admin: 0 (1/5/2017 10:30 PM)  Marcella Score: 10 (1/5/2017  4:00 PM)      "

## 2017-01-06 NOTE — PROGRESS NOTES
Progress Note  Neurosurgery    Admit Date: 1/4/2017  Post-operative Day: 1 Day Post-Op  Hospital Day: 3    SUBJECTIVE:     More Fournier is a 36 y.o. female presenting with visual hallucinations and headache, found to have R occipital mass, now s/p craniotomy for resection. Frozen section consistent with melanin-containing lesional tissue.    No acute events overnight. Denies visual change.      Follow-up For:  Procedure(s) (LRB):  CRANIOTOMY WITH STEALTH-for tumor resection (Right)      Scheduled Meds:   acetaminophen  1,000 mg Intravenous Q8H    albuterol-ipratropium 2.5mg-0.5mg/3mL  3 mL Nebulization Q4H    cefTRIAXone (ROCEPHIN) IVPB  2 g Intravenous Q12H    dexamethasone  4 mg Intravenous Q6H    levetiracetam  500 mg Oral BID    pantoprazole  40 mg Intravenous Before breakfast     Continuous Infusions:   sodium chloride 0.9% 100 mL/hr at 01/05/17 0538     PRN Meds:acetaminophen, acetaminophen, dextrose 50%, dextrose 50%, glucagon (human recombinant), glucose, glucose, glucose, hydrALAZINE, insulin aspart, labetalol, ondansetron    Review of patient's allergies indicates:  No Known Allergies    OBJECTIVE:     Vital Signs (Most Recent)  Temp: 98.2 °F (36.8 °C) (01/06/17 0300)  Pulse: 60 (01/06/17 0436)  Resp: 17 (01/06/17 0436)  BP: (!) 116/58 (01/06/17 0300)  SpO2: 96 % (01/06/17 0436)    Vital Signs Range (Last 24H):  Temp:  [96.1 °F (35.6 °C)-98.6 °F (37 °C)]   Pulse:  []   Resp:  [16-30]   BP: (109-147)/(57-87)   SpO2:  [96 %-100 %]   Arterial Line BP: (123-175)/(24-88)     I & O (Last 24H):    Intake/Output Summary (Last 24 hours) at 01/06/17 0515  Last data filed at 01/06/17 0400   Gross per 24 hour   Intake          2713.75 ml   Output             4394 ml   Net         -1680.25 ml     Physical Exam:  General: well developed, well nourished. no acute distress.  Neurologic: Awake, alert and oriented x3. Thought content appropriate.  Head: normocephalic, atraumatic   GCS: Motor: 6/Verbal: 5/Eyes:  4 GCS Total: 15  Cranial nerves: face symmetric, tongue midline, pupils equal, round, reactive to light with accomodation, extraocular muscles intact. CN II-XII grossly intact.   Language: no aphasia  Speech: no dysarthria   Sensory: response to light touch throughout  Motor Strength: Moves all extremities spontaneously with good tone. Full strength upper and lower extremities.           Pronator Drift: no drift noted  Coordination: finger to nose normal bilaterally  No focal numbness or weakness  Skin: warm, dry and intact. Concerned for lesion on chest.      Lines/Drains:       Peripheral IV - Single Lumen 01/04/17 0052 Left Antecubital (Active)   Site Assessment Clean;Dry;Intact 1/4/2017  8:11 PM   Line Status Flushed;Infusing 1/4/2017  8:11 PM   Dressing Status Clean;Dry;Intact 1/4/2017  8:11 PM   Dressing Intervention Dressing reinforced 1/4/2017  8:11 PM   Dressing Change Due 01/08/17 1/4/2017  8:11 PM   Site Change Due 01/08/17 1/4/2017  8:11 PM   Reason Not Rotated Not due 1/4/2017  8:11 PM   Number of days:1       Laboratory:  CBC:     Recent Labs  Lab 01/06/17  0125   WBC 9.22   RBC 3.77*   HGB 7.3*   HCT 25.6*   *   MCV 68*   MCH 19.4*   MCHC 28.5*     BMP:     Recent Labs  Lab 01/06/17  0125   *      K 3.9      CO2 22*   BUN 9   CREATININE 0.8   CALCIUM 8.3*   MG 2.0     Coagulation:     Recent Labs  Lab 01/05/17  0403   INR 1.0   APTT 21.2       Diagnostic Results:  CT head: expected postoperative changes     ASSESSMENT/PLAN:     35 yo female with hx of tobacco use with R occipital mass, s/p crani for resection.    --Q1 hr neuro checks for 24 hours post-op   --PT/OT/OOB  --Recommend dermatology consultation today for evaluation of possible melanoma  --Dex 4Q6  --Keppra  --SBP <140 strict  --Na 140-150   --ADAT  --Duonebs and chest PT scheduled (patient is active smoker)   --FU H/H: consider transfusion. Patient with uterine prolapse and has very heavy menses resulting in acute  blood loss superimposed on chronic anemia which is preexisting diagnosis; currently menstruating   --TEDs/SCDs  --PPi while on Dex

## 2017-01-06 NOTE — PLAN OF CARE
Problem: Patient Care Overview  Goal: Plan of Care Review  Outcome: Ongoing (interventions implemented as appropriate)  POC reviewed with pt at 0500. Pt verbalized understanding. Questions and concerns addressed. No acute events overnight. Pt went to CT on monitor with RN and back- tolerated well. Pt a-line and blanca dc'd. Pt progressing toward goals. Will continue to monitor. See flowsheets for full assessment and VS info

## 2017-01-06 NOTE — ANESTHESIA RELEASE NOTE
Anesthesia Release from PACU note     Patient: More Fournier  Procedure(s) Performed: Procedure(s):  CRANIOTOMY WITH STEALTH-for tumor resection  Anesthesia type: general  Post pain: Adequate analgesi  Post assessment: no apparent anesthetic complications, tolerated procedure well and no evidence of recall  Last Vitals:   Vitals:    01/06/17 0701   BP: (!) 142/69   Pulse: 72   Resp: 19   Temp: 36.6 °C (97.9 °F)   SpO2: 97%     Post vital signs: stable  Level of consciousness: awake, alert  and oriented  Nausea/Vomiting: no nausea/no vomiting  Complications: none  Airway Patency: patent  Respiratory: unassisted  Cardiovascular: stable and blood pressure at baseline  Hydration: euvolemic

## 2017-01-06 NOTE — PLAN OF CARE
Problem: Occupational Therapy Goal  Goal: Occupational Therapy Goal  Goals to be met by: 01/16/17     Patient will increase functional independence with ADLs by performing:    Feeding with [place].  UE Dressing with [place].  LE Dressing with Modified [place].  Grooming while standing with [place].  Toileting from toilet with [place] for hygiene and clothing management.   Bathing from edge of bed with Modified [place].  Toilet transfer to toilet with [place].  Upper extremity exercise program x15 reps per handout, with independence using theraband.  Outcome: Ongoing (interventions implemented as appropriate)  Pt was evaluated and agreeable to OT session.  Pt was noted with gait instability, decreased strength/endurance, and decreased self care skills.  Pt functioned at S to Mod I level with ADLs and bed mobility, with SBA needed for ambulation due to gait instability.  Due to pt's current level of function with ADLs and mobility, and good therapeutic prognosis, pt is noted to be within the minimal complexity level for OT evaluation. Pt will benefit from continued skilled OT services in order to work towards increasing her level of independence with self care and functional mobility skills, as well as, improving overall UB strength/endurance. OT's POC will include frequency of 3x/week while in acute setting to be discharged home with family support as needed.  At this time, no recommendations are made for DME.      Elyse Melissa, OT.  1/6/2017

## 2017-01-06 NOTE — PROGRESS NOTES
ICU Attending Note  Neurocritical Care    Walking up and down stairs this morning.  L hemianopia.  No drift.    -dexamethasone change to PO  -levetiracetam  --160  -stop IVF  -ceftriaxone per Neurosurgery  -send iron studies and likely start ferrous sulfate  -HGB >7  -ADAT  -start heparin prophylaxis tomorrow  -pantoprazole prophylaxis while on high dose steroids    Goal: May transfer to floor given stability if agreeable to Neurosurgery.

## 2017-01-06 NOTE — PLAN OF CARE
01/06/17 1607   Discharge Assessment   Assessment Type Discharge Planning Assessment   Confirmed/corrected address and phone number on facesheet? Yes   Assessment information obtained from? Patient   Expected Length of Stay (days) 3   Communicated expected length of stay with patient/caregiver yes   Prior to hospitilization cognitive status: Alert/Oriented   Prior to hospitalization functional status: Independent   Current cognitive status: Alert/Oriented   Current Functional Status: Independent   Lives With spouse;child(lourdes), dependent   Able to Return to Prior Arrangements yes   Is patient able to care for self after discharge? Yes   How many people do you have in your home that can help with your care after discharge? 1   Who are your caregiver(s) and their phone number(s)? Darlyn Wells (mother)  [phone]   Patient's perception of discharge disposition home or selfcare   Readmission Within The Last 30 Days no previous admission in last 30 days   Patient currently being followed by outpatient case management? No   Patient currently receives home health services? No   Does the patient currently use HME? No   Patient currently receives private duty nursing? N/A   Patient currently receives any other outside agency services? No   Equipment Currently Used at Home none   Do you have any problems affording any of your prescribed medications? Yes   Is the patient taking medications as prescribed? yes   Do you have any financial concerns preventing you from receiving the healthcare you need? Yes  (No insurance)   Does the patient have transportation to healthcare appointments? Yes   Transportation Available family or friend will provide   On Dialysis? No   Does the patient receive services at the [facility]madin Clinic? No   Are there any open cases? No   Discharge Plan A Home   Discharge Plan B Home   Patient/Family In Agreement With Plan yes     Patient stated that she will need assistance with medications.        Discharge/ My Health Packet Folder Given to patient/family:      Yes      PCP:  NONE      Pharmacy:      [facility] [address] [facility] [place] - [address]  [address]  [place] [address]  Phone: [phone] Fax: [phone]        Emergency Contacts:  Extended Emergency Contact Information  Primary Emergency Contact: CampbellnylylemashaDarlyn  Address: [address] [place], [place] [address] [facility]  Home Phone: [phone]  Relation: Mother      Insurance:  Payor: GENERIC OUT OF STATE MEDICAID / Plan: PENDING OUT-OF-STATE MEDICAID / Product Type: Government /       Lilli Ozuna RN, CCRN-K, [facility]  Neuro-Critical Care   X [phone]

## 2017-01-06 NOTE — PLAN OF CARE
Problem: Patient Care Overview  Goal: Plan of Care Review  Outcome: Ongoing (interventions implemented as appropriate)  POC reviewed with pt and family at 1700. Pt verbalized understanding. Questions and concerns addressed. No acute events today. Pt progressing toward goals. Pt to transfer to neuro stepdown unit.  Will continue to monitor. See flowsheets for full assessment and VS info.

## 2017-01-06 NOTE — PT/OT/SLP EVAL
"Occupational Therapy  Evaluation    More Fournier   MRN: [id number]   Admitting Diagnosis: Brain tumor    OT Date of Treatment: 17   OT Start Time: 812  OT Stop Time: 845  OT Total Time (min): 33 min    Billable Minutes:  Evaluation 33    Diagnosis: Brain tumor       Past Medical History   Diagnosis Date    Anemia     Prolapsed uterus       Past Surgical History   Procedure Laterality Date    Tubal ligation         Referring physician: Dr. Elliott  Date referred to OT: 17    General Precautions: Standard, fall  Orthopedic Precautions: N/A  Braces: N/A          Patient History:  Living Environment  Lives With: child(lourdes), dependent, spouse  Living Arrangements: mobile home  Home Accessibility: stairs to enter home  Number of Stairs to Enter Home: 4  Stair Railings at Home: outside, present at both sides  Transportation Available: family or friend will provide  Living Environment Comment:  (Pt lives in mobile home with  and 3 minor children - pt's PLOF was I with ADLs and mobility - pt does not use DME - pt will have assist at home if needed)  Equipment Currently Used at Home: none    Prior level of function:   Bed Mobility/Transfers: independent  Grooming: independent  Bathing: independent  Upper Body Dressing: independent  Lower Body Dressing: independent  Toileting: independent  Home Management Skills: independent  Homemaking Responsibilities: Yes  Driving License: Yes  Mode of Transportation: Car     Dominant hand: right    Subjective:  Communicated with nurse prior to session.  "I'm not handicapped you know."  Chief Complaint: being in bed  Patient/Family stated goals: return home soon    Pain Ratin/10 (pressure in head)              Pain Rating Post-Intervention: 0/10    Objective:  Patient found with: blood pressure cuff, peripheral IV, telemetry, pulse ox (continuous)    Cognitive Exam:  Oriented to: Person, Place, Time and Situation  Follows Commands/attention: Follows multistep  " commands  Communication: clear/fluent  Memory:  No Deficits noted  Safety awareness/insight to disability: impaired- not fully aware of deficits  Coping skills/emotional control: Appropriate to situation    Visual/perceptual:  Intact  For evaluation    Physical Exam:  Postural examination/scapula alignment: No postural abnormalities identified  Skin integrity: Visible skin intact  Edema: None noted in UEs    Sensation:   Intact    Upper Extremity Range of Motion:  Right Upper Extremity: WFL  Left Upper Extremity: WFL    Upper Extremity Strength:  Right Upper Extremity: WFL  Left Upper Extremity: WFL   Strength: good bilaterally    Fine motor coordination:   Intact    Gross motor coordination: WFL    Functional Mobility:  Bed Mobility:  Rolling/Turning to Left: Independent  Rolling/Turning Right: Independent  Scooting/Bridging: Independent  Supine to Sit:  (increased time)  Sit to Supine: Modified Independent, With side rail (increased time)    Transfers:  Sit <> Stand Assistance: Supervision  Bed <> Chair Technique: Stand Pivot  Bed <> Chair Transfer Assistance: Supervision  Bed <> Chair Assistive Device: No Assistive Device  Toilet Transfer Technique: Stand Pivot  Toilet Transfer Assistance: Supervision  Toilet Transfer Assistive Device: No Assistive Device  Tub Bench Transfer Assistance: Activity did not occur    Functional Ambulation: Pt able to walk from bed to bathroom with CGA to SBA without AD.  Pt performed transfers with Mod I to SBA for safety.     Activities of Daily Living:  Feeding Level of Assistance: Activity did not occur  Feeding adaptive equipment: N/A  UE Dressing Level of Assistance:  (dressed in sitting)  UE adaptive equipment: N/A  LE Dressing Level of Assistance: Supervision (donned socks and pants in standing - held onto sink for stability)  LE adaptive equipment: N/A  Grooming Position: Standing at sink  Grooming Level of Assistance: Modified independent  Toileting Where Assessed:  "Toilet  Toileting Level of Assistance: Modified independent     Bathing Level of Assistance: Activity did not occur  Bathing adaptive equipment: N/A    Balance:   Static Sit: GOOD: Takes MODERATE challenges from all directions  Dynamic Sit: GOOD: Maintains balance through MODERATE excursions of active trunk movement  Static Stand: GOOD-: Takes MODERATE challenges from all directions inconsistently  Dynamic stand: FAIR+: Needs CLOSE SUPERVISION during gait and is able to right self with minor LOB    Therapeutic Activities and Exercises:  Pt performed balance and self care tasks for evaluation.  Pt and family educated on role of OT in acute setting.  White board updated.     -[facility] 6 CLICK ADL  How much help from another person does this patient currently need?  1 = Unable, Total/Dependent Assistance  2 = A lot, Maximum/Moderate Assistance  3 = A little, Minimum/Contact Guard/Supervision  4 = None, Modified [place]/Independent    Putting on and taking off regular lower body clothing? : 4  Bathing (including washing, rinsing, drying)?: 3  Toileting, which includes using toilet, bedpan, or urinal? : 4  Putting on and taking off regular upper body clothing?: 4  Taking care of personal grooming such as brushing teeth?: 4  Eating meals?: 4  Total Score: 23    -PAC Raw Score CMS "G-Code Modifier Level of Impairment Assistance   6 % Total / Unable   7 - 9 CM 80 - 100% Maximal Assist   10 - 14 CL 60 - 80% Moderate Assist   15 - 19 CK 40 - 60% Moderate Assist   20 - 22 CJ 20 - 40% Minimal Assist   23 CI 1-20% SBA / CGA   24 CH 0% Independent/ Mod I       Patient left HOB elevated with all lines intact, call button in reach and family present    Assessment:  More Fournier is a 36 y.o. female with a medical diagnosis of Brain tumor and presents with gait instability, decreased strength/endurance, and decreased self care skills.  Pt functioned at S to Mod I level with ADLs and bed mobility, with SBA needed for " ambulation due to gait instability.  Due to pt's current level of function with ADLs and mobility, and good therapeutic prognosis, pt is noted to be within the minimal complexity level for OT evaluation. Pt will benefit from continued skilled OT services in order to work towards increasing her level of independence with self care and functional mobility skills, as well as, improving overall UB strength/endurance. OT's POC will include frequency of 3x/week while in acute setting to be discharged home with family support as needed.  At this time, no recommendations are made for DME.   .    Rehab identified problem list/impairments: Rehab identified problem list/impairments: weakness, impaired endurance, gait instability    Rehab potential is excellent.    Activity tolerance: Good    Discharge recommendations: Discharge Facility/Level Of Care Needs: home (with family support)     Barriers to discharge: Barriers to Discharge: Inaccessible home environment    Equipment recommendations: none (may need shower chair for bathing - to be determined prior to discharge)     GOALS:   Occupational Therapy Goals        Problem: Occupational Therapy Goal    Goal Priority Disciplines Outcome Interventions   Occupational Therapy Goal     OT, PT/OT Ongoing (interventions implemented as appropriate)    Description:  Goals to be met by: 01/16/17     Patient will increase functional independence with ADLs by performing:    Feeding with [place].  UE Dressing with [place].  LE Dressing with Modified [place].  Grooming while standing with [place].  Toileting from toilet with [place] for hygiene and clothing management.   Bathing from  edge of bed with Modified [place].  Toilet transfer to toilet with [place].  Upper extremity exercise program x15 reps per handout, with independence using theraband.                PLAN:  Patient to be seen 3 x/week to address the above listed problems via self-care/home  management, therapeutic activities, therapeutic exercises  Plan of Care expires: 02/05/17  Plan of Care reviewed with: patient, family         Elyse Melissa, OT  01/06/2017

## 2017-01-07 PROBLEM — R51.9 HEADACHE: Status: ACTIVE | Noted: 2017-01-07

## 2017-01-07 LAB
ANION GAP SERPL CALC-SCNC: 8 MMOL/L
ANISOCYTOSIS BLD QL SMEAR: SLIGHT
BASOPHILS # BLD AUTO: 0 K/UL
BASOPHILS NFR BLD: 0 %
BUN SERPL-MCNC: 10 MG/DL
CALCIUM SERPL-MCNC: 8.9 MG/DL
CHLORIDE SERPL-SCNC: 107 MMOL/L
CO2 SERPL-SCNC: 23 MMOL/L
CREAT SERPL-MCNC: 0.7 MG/DL
DIFFERENTIAL METHOD: ABNORMAL
EOSINOPHIL # BLD AUTO: 0 K/UL
EOSINOPHIL NFR BLD: 0 %
ERYTHROCYTE [DISTWIDTH] IN BLOOD BY AUTOMATED COUNT: 20 %
EST. GFR  (AFRICAN AMERICAN): >60 ML/MIN/1.73 M^2
EST. GFR  (NON AFRICAN AMERICAN): >60 ML/MIN/1.73 M^2
GLUCOSE SERPL-MCNC: 138 MG/DL
HCT VFR BLD AUTO: 28.4 %
HGB BLD-MCNC: 8 G/DL
HYPOCHROMIA BLD QL SMEAR: ABNORMAL
LYMPHOCYTES # BLD AUTO: 1.3 K/UL
LYMPHOCYTES NFR BLD: 14.4 %
MAGNESIUM SERPL-MCNC: 2.3 MG/DL
MCH RBC QN AUTO: 19.1 PG
MCHC RBC AUTO-ENTMCNC: 28.2 %
MCV RBC AUTO: 68 FL
MONOCYTES # BLD AUTO: 0.4 K/UL
MONOCYTES NFR BLD: 4.9 %
NEUTROPHILS # BLD AUTO: 7.2 K/UL
NEUTROPHILS NFR BLD: 80.7 %
PHOSPHATE SERPL-MCNC: 3.4 MG/DL
PLATELET # BLD AUTO: 146 K/UL
PLATELET BLD QL SMEAR: ABNORMAL
PMV BLD AUTO: ABNORMAL FL
POCT GLUCOSE: 150 MG/DL (ref 70–110)
POLYCHROMASIA BLD QL SMEAR: ABNORMAL
POTASSIUM SERPL-SCNC: 4.2 MMOL/L
RBC # BLD AUTO: 4.19 M/UL
SODIUM SERPL-SCNC: 138 MMOL/L
WBC # BLD AUTO: 8.95 K/UL

## 2017-01-07 PROCEDURE — 94640 AIRWAY INHALATION TREATMENT: CPT

## 2017-01-07 PROCEDURE — 25000003 PHARM REV CODE 250: Performed by: PHYSICIAN ASSISTANT

## 2017-01-07 PROCEDURE — 25000242 PHARM REV CODE 250 ALT 637 W/ HCPCS: Performed by: NEUROLOGICAL SURGERY

## 2017-01-07 PROCEDURE — 63600175 PHARM REV CODE 636 W HCPCS: Performed by: NEUROLOGICAL SURGERY

## 2017-01-07 PROCEDURE — 99024 POSTOP FOLLOW-UP VISIT: CPT | Mod: ,,, | Performed by: NEUROLOGICAL SURGERY

## 2017-01-07 PROCEDURE — 0HB5XZX EXCISION OF CHEST SKIN, EXTERNAL APPROACH, DIAGNOSTIC: ICD-10-PCS | Performed by: DERMATOLOGY

## 2017-01-07 PROCEDURE — 25000003 PHARM REV CODE 250: Performed by: STUDENT IN AN ORGANIZED HEALTH CARE EDUCATION/TRAINING PROGRAM

## 2017-01-07 PROCEDURE — 80048 BASIC METABOLIC PNL TOTAL CA: CPT

## 2017-01-07 PROCEDURE — 88305 TISSUE EXAM BY PATHOLOGIST: CPT | Performed by: PATHOLOGY

## 2017-01-07 PROCEDURE — 36415 COLL VENOUS BLD VENIPUNCTURE: CPT

## 2017-01-07 PROCEDURE — 20600001 HC STEP DOWN PRIVATE ROOM

## 2017-01-07 PROCEDURE — 85025 COMPLETE CBC W/AUTO DIFF WBC: CPT

## 2017-01-07 PROCEDURE — 83735 ASSAY OF MAGNESIUM: CPT

## 2017-01-07 PROCEDURE — 88342 IMHCHEM/IMCYTCHM 1ST ANTB: CPT | Mod: 26,,, | Performed by: PATHOLOGY

## 2017-01-07 PROCEDURE — 63600175 PHARM REV CODE 636 W HCPCS: Performed by: PSYCHIATRY & NEUROLOGY

## 2017-01-07 PROCEDURE — 84100 ASSAY OF PHOSPHORUS: CPT

## 2017-01-07 PROCEDURE — 63600175 PHARM REV CODE 636 W HCPCS: Performed by: PHYSICIAN ASSISTANT

## 2017-01-07 RX ADMIN — HYDRALAZINE HYDROCHLORIDE 10 MG: 20 INJECTION INTRAMUSCULAR; INTRAVENOUS at 12:01

## 2017-01-07 RX ADMIN — LEVETIRACETAM 500 MG: 500 TABLET, FILM COATED ORAL at 08:01

## 2017-01-07 RX ADMIN — CEFTRIAXONE 2 G: 2 INJECTION, SOLUTION INTRAVENOUS at 02:01

## 2017-01-07 RX ADMIN — LEVETIRACETAM 500 MG: 500 TABLET, FILM COATED ORAL at 09:01

## 2017-01-07 RX ADMIN — HYDROCODONE BITARTRATE AND ACETAMINOPHEN 1 TABLET: 5; 325 TABLET ORAL at 08:01

## 2017-01-07 RX ADMIN — HYDROCODONE BITARTRATE AND ACETAMINOPHEN 1 TABLET: 5; 325 TABLET ORAL at 11:01

## 2017-01-07 RX ADMIN — IPRATROPIUM BROMIDE AND ALBUTEROL SULFATE 3 ML: .5; 3 SOLUTION RESPIRATORY (INHALATION) at 08:01

## 2017-01-07 RX ADMIN — IPRATROPIUM BROMIDE AND ALBUTEROL SULFATE 3 ML: .5; 3 SOLUTION RESPIRATORY (INHALATION) at 04:01

## 2017-01-07 RX ADMIN — CEFTRIAXONE 2 G: 2 INJECTION, SOLUTION INTRAVENOUS at 11:01

## 2017-01-07 RX ADMIN — HEPARIN SODIUM 5000 UNITS: 5000 INJECTION, SOLUTION INTRAVENOUS; SUBCUTANEOUS at 09:01

## 2017-01-07 RX ADMIN — HEPARIN SODIUM 5000 UNITS: 5000 INJECTION, SOLUTION INTRAVENOUS; SUBCUTANEOUS at 05:01

## 2017-01-07 RX ADMIN — DEXAMETHASONE 4 MG: 4 TABLET ORAL at 12:01

## 2017-01-07 RX ADMIN — FERROUS SULFATE TAB EC 325 MG (65 MG FE EQUIVALENT) 325 MG: 325 (65 FE) TABLET DELAYED RESPONSE at 08:01

## 2017-01-07 RX ADMIN — DEXAMETHASONE 4 MG: 4 TABLET ORAL at 04:01

## 2017-01-07 RX ADMIN — HEPARIN SODIUM 5000 UNITS: 5000 INJECTION, SOLUTION INTRAVENOUS; SUBCUTANEOUS at 02:01

## 2017-01-07 RX ADMIN — CEFTRIAXONE 2 G: 2 INJECTION, SOLUTION INTRAVENOUS at 12:01

## 2017-01-07 RX ADMIN — IPRATROPIUM BROMIDE AND ALBUTEROL SULFATE 3 ML: .5; 3 SOLUTION RESPIRATORY (INHALATION) at 07:01

## 2017-01-07 RX ADMIN — DEXAMETHASONE 4 MG: 4 TABLET ORAL at 11:01

## 2017-01-07 RX ADMIN — HYDROCODONE BITARTRATE AND ACETAMINOPHEN 1 TABLET: 5; 325 TABLET ORAL at 05:01

## 2017-01-07 RX ADMIN — DEXAMETHASONE 4 MG: 4 TABLET ORAL at 05:01

## 2017-01-07 NOTE — CONSULTS
Dermatology Inpatient Consult Note:  1/7/2017  10:42 AM    Reason for consult:  Concern for metastatic melanoma with unknown primary    HPI: 36 y.o. woman presented with headaches and visual complaints then was found to have occipital lobe CNS tumor. She subsequently underwent neurosurgery with tumor resection. Frozen sections were concerning for melanoma. Final path is still pending. Neurosurgery consulted Dermatology for full body skin check. There is particular concern about a pigmented skin lesion on her left chest. Patient admits lesion has been present for at least 5 - 10 years. Lesion has changed size, shape, color and texture in recent months. She saw an outside dermatologist who recommended biopsy, but patient declined. Of note patient endorses ~ 40 lb weight loss in past year.    Patient denies and personal or family history of melanoma.    Scheduled Meds:   albuterol-ipratropium 2.5mg-0.5mg/3mL  3 mL Nebulization Q4H    cefTRIAXone (ROCEPHIN) IVPB  2 g Intravenous Q12H    dexamethasone  4 mg Oral Q6H    ferrous sulfate  325 mg Oral Daily    heparin (porcine)  5,000 Units Subcutaneous Q8H    levetiracetam  500 mg Oral BID    pantoprazole  40 mg Oral Daily     Continuous Infusions:   PRN Meds:.acetaminophen, dextrose 50%, dextrose 50%, glucagon (human recombinant), glucose, glucose, glucose, hydrocodone-acetaminophen 5-325mg, insulin aspart, ondansetron    Review of patient's allergies indicates:  No Known Allergies    Past Medical History   Diagnosis Date    Anemia     Prolapsed uterus        Past Surgical History   Procedure Laterality Date    Tubal ligation         Social History     Social History    Marital status:      Spouse name: N/A    Number of children: N/A    Years of education: N/A     Occupational History    Not on file.     Social History Main Topics    Smoking status: Current Every Day Smoker    Smokeless tobacco: Not on file    Alcohol use Yes      Comment:  occasional    Drug use: No    Sexual activity: Not on file     Other Topics Concern    Not on file     Social History Narrative       History reviewed. No pertinent family history.    Review of Systems:  + unintentional weight loss  + visual changes  + changing skin lesion on left chest  Constitutional:  no fevers, chills, fatigue, or malaise.  HEENT: no headaches, dysphagia, or mouth sores.  Heme: no easy bruising or bleeding.  Musculoskeletal: no arthralgias, joint swelling, or myalgias.  GI: no nausea, vomiting, or diarrhea.  : no genital sores, dysuria, or genital itching.  Neuro: no numbness or tingling.  CV: no chest pain or shortness of breath.  Skin: no pruritus, burning, pain, blisters or history of keloidal scarring    Physical Exam:  Vitals:    01/07/17 0740   BP: 134/64   Pulse: 88   Resp:    Temp:    General: NAD, WDWN.  Psych: AAOx3.  Skin:  - Scalp: no lesions concerning for melanoma  - Face: no lesions concerning for melanoma  - Neck: no lesions concerning for melanoma  - Chest: ~ 9 mm atypical appearing erythematous to hyperpigmented papule with irregular borders on the left chest  - Back: no lesions concerning for melanoma  - Abdomen: no lesions concerning for melanoma  - Genitalia/buttocks: no lesions concerning for melanoma  - RUE: no lesions concerning for melanoma  - LUE: no lesions concerning for melanoma  - RLE: no lesions concerning for melanoma  - LLE: no lesions concerning for melanoma  - Hands: no lesions concerning for melanoma  - Feet: no lesions concerning for melanoma    Assessment and Plan:    36 year old woman was recently found to have CNS tumor. She is post op day # 2 s/p craniotomy and tumor excision. Final path is pending but frozen sections are concerning for melanoma. Unknown primary at this time but there is a concerning pigmented lesion on patient's left chest.      -Patient needs skin biopsy  -Will coordinate with primary team  -Pathology results will not be available  until next week  -Patient aware of plan  -Please call me if you have any questions      Ruy Thomass, PGY3  [facility] Dermatology  [phone]

## 2017-01-07 NOTE — PROGRESS NOTES
Dermatology Brief Procedure Note:  1/7/17  12:30    Shave biopsy procedure note:  LEFT CHEST    Ddx: Melanoma vs Dysplastic Nevus vs Seborrheic Keratosis    Shave biopsy performed after verbal consent including risk of infection, scar, recurrence, need for additional treatment of site. Area prepped with alcohol, anesthetized with approximately 1.0cc of 1% lidocaine with epinephrine. Lesional tissue shaved with flexible blade. No complications. Dressing applied. Wound care explained.      Ruy Aly, PGY3

## 2017-01-07 NOTE — NURSING TRANSFER
Nursing Transfer Note      1/6/2017     Transfer from 7079 to 701    Transfer via bed      Transported by Ariel BANG    Medicines sent: n/a    Chart send with patient: yes    Notified: family at bedside    Upon arrival to floor: patient oriented to unit. Bed at lowest position. Call light in reach

## 2017-01-07 NOTE — PROGRESS NOTES
ICU Progress Note  Neurocritical Care    Admit Date: 1/4/2017  LOS: 2    CC: Brain tumor    SUBJECTIVE:     Interval History/Significant Events: Mrs. Fournier is a 36 year old female POD 1 s/p crani for R occipital mass. Pt tolerated procedure well and is stable to transfer to floor.     Review of Symptoms: + pain surrounding incision   Constitutional: Denies fevers or chills.  Pulmonary: Denies shortness of breath or cough.  Cardiology: Denies chest pain or palpitations.  GI: Denies abdominal pain or constipation.  Neurologic: Denies new weakness, headaches, or paresthesias.     Medications:  Continuous Infusions:   Scheduled Meds:   albuterol-ipratropium 2.5mg-0.5mg/3mL  3 mL Nebulization Q4H    cefTRIAXone (ROCEPHIN) IVPB  2 g Intravenous Q12H    dexamethasone  4 mg Oral Q6H    ferrous sulfate  325 mg Oral Daily    [START ON 1/7/2017] heparin (porcine)  5,000 Units Subcutaneous Q8H    levetiracetam  500 mg Oral BID    [START ON 1/7/2017] pantoprazole  40 mg Oral Daily     PRN Meds:.acetaminophen, dextrose 50%, dextrose 50%, glucagon (human recombinant), glucose, glucose, glucose, hydrALAZINE, hydrocodone-acetaminophen 5-325mg, insulin aspart, labetalol, ondansetron    OBJECTIVE:     I & O (24h):  Intake/Output Summary (Last 24 hours) at 01/06/17 1805  Last data filed at 01/06/17 1700   Gross per 24 hour   Intake             2190 ml   Output             1129 ml   Net             1061 ml       Physical Exam:  Last Vitals:  Vitals:    01/06/17 1800   BP: 130/71   Pulse: 85   Resp: (!) 23   Temp:      Vital Signs (24h Range):   Temp:  [97.9 °F (36.6 °C)-98.6 °F (37 °C)] 97.9 °F (36.6 °C)  Pulse:  [60-99] 85  Resp:  [17-33] 23  BP: (116-144)/(57-82) 130/71  Arterial Line BP: (123-138)/(57-70) 138/66  GA: Alert, comfortable, no acute distress.   HEENT: No scleral icterus or JVD.   Pulmonary: Clear to auscultation A/P/L. No wheezing, crackles, or rhonchi.  Cardiac: RRR S1 & S2 w/o rubs/murmurs/gallops.   Abdominal:  Bowel sounds present x 4. No appreciable hepatosplenomegaly.  Skin: No jaundice, rashes, or visible lesions.  Neuro:  --GCS: E4 V5 M6  --Mental Status:  Alert and oriented x 4.  --CN II-XII grossly intact.   --Pupils 4mm, PERRL.   --Corneal reflex, gag, cough intact.  --LUE strength: 5/5  --RUE strength: 5/5  --LLE strength: 5/5  --RLE strength: 5/5      --Surgical Drains/Mane:        Drain/Device  01/05/17 1440 Right posterior other (see comments) evacuation tube (Active)   Insertion Site dressing intact 1/6/2017  3:01 PM   Drainage Characteristics/Odor serosanguineous 1/6/2017  3:01 PM   Drainage Amount small 1/6/2017  3:01 PM   General Intake (mL) 0 1/5/2017 11:00 PM   General Output (mL) 4 1/6/2017  3:01 PM       Nutrition: regular      Labs:  ABG: No results for input(s): PH, PO2, PCO2, HCO3, POCSATURATED, BE in the last 24 hours.    BMP:  Recent Labs  Lab 01/06/17  0125      K 3.9      CO2 22*   BUN 9   CREATININE 0.8   *   MG 2.0   PHOS 3.2       LFT: Lab Results   Component Value Date    AST 22 01/04/2017    ALT 22 01/04/2017    ALKPHOS 82 01/04/2017    BILITOT 0.4 01/04/2017    ALBUMIN 3.7 01/04/2017    PROT 8.4 01/04/2017       CBC:   Lab Results   Component Value Date    WBC 8.98 01/06/2017    HGB 7.7 (L) 01/06/2017    HCT 27.0 (L) 01/06/2017    MCV 68 (L) 01/06/2017     (L) 01/06/2017       Microbiology x 7d:   Microbiology Results (last 7 days)     ** No results found for the last 168 hours. **          Imaging:  CTH 1/6/17  Postoperative changes compatible with interval right parietal-occipital craniotomy for resection of the previously demonstrated mass within the right occipital lobe. There is trace postoperative hemorrhage with continued significant vasogenic edema throughout the right parietal-occipital-temporal region. There is persistent mass effect upon the occipital horn of the right lateral ventricle with mildly improved leftward midline shift now measuring 5 mm.  Continued follow up is advised.    ASSESSMENT/PLAN:     Patient Active Problem List   Diagnosis    Brain tumor       Neuro:   POD # 1 s/p Right Temporoparietal Crani for Right Occipital Mass suspicious for Mets   --Continue Dexamethasone 4 q 6  --SBP goal < 160  --PT/OT/Speech  --Keppra 500 mg BID      Pulmonary:   --CXR, ABG prn   --Continue Duo Nebs     Cardiac:   --SBP goal < 160  -- 2D echo pending     Renal:   --Continue to monitor I/O  --BUN, creatinine stable  --daily CMP     ID:   -- Afebrile   -- No leukocytosis  -- Continue Cetriaxone for drain ppx      Hem/Onc:   --H/H decreased  --iron studies pending  --likely 2/2 iron deficiency  --begin ferrous sulfate      Endocrine:   --euglycemic      Fluids/Electrolytes/Nutrition/GI:   --advance diet to regular      PPX:  -PUD: Pantoprazole 40 mg daily  -DVT: SCDs, begin SQH tomorroe    Dispo: Transfer to [facility] today.  Discussed with team.     Mady Juarez PA-C  Neuro Critical Care  [id number]

## 2017-01-07 NOTE — CONSULTS
Chief complaint/Reason for Consult: retina melanoma     History of Present Illness: More Fournier is a 36 year old female with a PMHx of anemia, smoker, and possible hypertension who is s/p Right temporoparietal craniotomy (1/5/2017) for a Right occipital mass suspicious for metastasis. Prelim pathology showing mass containing melanin. We are consulted to rule our intraocular melanoma. Patient denies any changes in vision or eye pain. She has never had any eye problems or surgeries.     POcularHx: no past ocular surgeries, does not use glasses or contacts     Current eye gtts: none      PMHx:  has a past medical history of Anemia and Prolapsed uterus.     PSurgHx:  has a past surgical history that includes Tubal ligation.     Home Medications:   Prior to Admission medications    Not on File        Medications this encounter:    albuterol-ipratropium 2.5mg-0.5mg/3mL  3 mL Nebulization Q4H    cefTRIAXone (ROCEPHIN) IVPB  2 g Intravenous Q12H    dexamethasone  4 mg Oral Q6H    ferrous sulfate  325 mg Oral Daily    heparin (porcine)  5,000 Units Subcutaneous Q8H    levetiracetam  500 mg Oral BID    pantoprazole  40 mg Oral Daily       Allergies: has No Known Allergies.     Social:  reports that she has been smoking.  She does not have any smokeless tobacco history on file. She reports that she drinks alcohol. She reports that she does not use illicit drugs.     Family Hx: No family history of glaucoma. family history is not on file.     ROS: Negative x 10 except for complaints as described in HPI; negative for fever, chills, weight loss, nausea, vomiting, diarrhea, shortness of breath, nasal discharge, cough, abdominal pain, dyspnea, difficulty moving arms and legs, confusion, dysuria, palpitations, or chest pain     Ocular examination/Dilated fundus examination:  Base Eye Exam     Visual Acuity      Right Left   Near sc 20/20 20/20         Tonometry (Tonopen, 11:18 AM)      Right Left   Pressure 24 20          Pupils      Dark Light React APD   Right 3 2 Slow None   Left 3 2 Slow None         Visual Fields      Right Left   Result  Full   Restrictions Partial superior nasal, inferior nasal deficiencies          Extraocular Movement      Right Left   Result Full Full         Neuro/Psych     Oriented x3:  Yes      Dilation     Both eyes:  1% Mydriacyl, 2.5% Phenylephrine @ 11:17 AM            Slit Lamp and Fundus Exam     External Exam      Right Left    External Normal Normal      Pen Light Exam      Right Left    Lids/Lashes Normal Normal    Conjunctiva/Sclera White and quiet White and quiet    Cornea Clear Clear    Anterior Chamber Deep and quiet Deep and quiet    Iris Round and reactive Round and reactive    Lens Clear Clear    Vitreous Normal Normal                Assessment/Plan:   1. 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

## 2017-01-07 NOTE — PLAN OF CARE
Problem: Patient Care Overview  Goal: Plan of Care Review  Outcome: Ongoing (interventions implemented as appropriate)  POC reviewed w pt and family members, who verbalized understanding. Mother and two daughters remain at bedside. Pt VS and neuro checks remain stable and WDL. Drain tubing dislodged from collection chamber by pt early in shift. NS notified, and after inspection by MD, drain removed. Pt c/o some pain at incision site. Pt able to ambulate to restroom w/ minimal assist. No acute events overnight. Pt remains free from fall, injury, or skin breakdown. Bed in lowest position, wheels locked, side rails raised x2, and call light w/in reach. Will continue to monitor.

## 2017-01-07 NOTE — PROGRESS NOTES
Progress Note  Neurosurgery    Admit Date: 1/4/2017  Post-operative Day: 2 Days Post-Op  Hospital Day: 4    SUBJECTIVE:     More Fournier is a 36 y.o. female presenting with visual hallucinations and headache, found to have R occipital mass, now s/p craniotomy for resection. Frozen section consistent with melanin-containing lesional tissue.    NAEON, no vision changes or HA.    Follow-up For:  Procedure(s) (LRB):  CRANIOTOMY WITH STEALTH-for tumor resection (Right)      Scheduled Meds:   albuterol-ipratropium 2.5mg-0.5mg/3mL  3 mL Nebulization Q4H    cefTRIAXone (ROCEPHIN) IVPB  2 g Intravenous Q12H    dexamethasone  4 mg Oral Q6H    ferrous sulfate  325 mg Oral Daily    heparin (porcine)  5,000 Units Subcutaneous Q8H    levetiracetam  500 mg Oral BID    pantoprazole  40 mg Oral Daily     Continuous Infusions:     PRN Meds:acetaminophen, dextrose 50%, dextrose 50%, glucagon (human recombinant), glucose, glucose, glucose, hydrocodone-acetaminophen 5-325mg, insulin aspart, ondansetron    Review of patient's allergies indicates:  No Known Allergies    OBJECTIVE:     Vital Signs (Most Recent)  Temp: 97.8 °F (36.6 °C) (01/07/17 0400)  Pulse: 81 (01/07/17 0400)  Resp: 18 (01/07/17 0400)  BP: 136/65 (01/07/17 0400)  SpO2: 96 % (01/07/17 0400)    Vital Signs Range (Last 24H):  Temp:  [97.8 °F (36.6 °C)-98.1 °F (36.7 °C)]   Pulse:  [67-99]   Resp:  [17-33]   BP: (120-159)/(60-85)   SpO2:  [95 %-100 %]     I & O (Last 24H):    Intake/Output Summary (Last 24 hours) at 01/07/17 0717  Last data filed at 01/06/17 1910   Gross per 24 hour   Intake             1320 ml   Output                9 ml   Net             1311 ml     Physical Exam:  General: well developed, well nourished. no acute distress.  Neurologic: Awake, alert and oriented x3. Thought content appropriate.  Head: normocephalic, atraumatic   GCS: Motor: 6/Verbal: 5/Eyes: 4 GCS Total: 15  Cranial nerves: face symmetric, tongue midline, pupils equal, round,  reactive to light with accomodation, extraocular muscles intact. CN II-XII grossly intact.   Language: no aphasia  Speech: no dysarthria   Sensory: response to light touch throughout  Motor Strength: Moves all extremities spontaneously with good tone. Full strength upper and lower extremities.           Pronator Drift: no drift noted  Coordination: finger to nose normal bilaterally  No focal numbness or weakness  Skin: warm, dry and intact. Concerned for lesion on chest.      Lines/Drains:       Peripheral IV - Single Lumen 01/04/17 0052 Left Antecubital (Active)   Site Assessment Clean;Dry;Intact 1/4/2017  8:11 PM   Line Status Flushed;Infusing 1/4/2017  8:11 PM   Dressing Status Clean;Dry;Intact 1/4/2017  8:11 PM   Dressing Intervention Dressing reinforced 1/4/2017  8:11 PM   Dressing Change Due 01/08/17 1/4/2017  8:11 PM   Site Change Due 01/08/17 1/4/2017  8:11 PM   Reason Not Rotated Not due 1/4/2017  8:11 PM   Number of days:1       Laboratory:  CBC:     Recent Labs  Lab 01/07/17  0330   WBC 8.95   RBC 4.19   HGB 8.0*   HCT 28.4*   *   MCV 68*   MCH 19.1*   MCHC 28.2*     BMP:     Recent Labs  Lab 01/07/17  0330   *      K 4.2      CO2 23   BUN 10   CREATININE 0.7   CALCIUM 8.9   MG 2.3     Coagulation:     Recent Labs  Lab 01/05/17  0403   INR 1.0   APTT 21.2       Diagnostic Results:  CT head: expected postoperative changes     ASSESSMENT/PLAN:     37 yo female with hx of tobacco use with R occipital mass, s/p crani for resection.    --Floor status  --PT/OT/OOB  --Fu derm consult for full body scan, and ophtho for possible retinal melanoma.  --Dex 4Q6  --Keppra  --SBP <150  --Na 140-150   --Tolerating PO  --Duonebs and chest PT scheduled (patient is active smoker)   --Anemia: H and H stable, continue to monitor.  --TEDs/SCDs  --PPi while on Dex

## 2017-01-07 NOTE — NURSING TRANSFER
Nursing Transfer Note      1/6/2017     Transfer to NS[facility] 701A FROM [facility]     Transfer via bed    Transfer with personal belongings    Transported by MERRITT Hoff    Medicines sent: N/A    Chart send with patient: YES    Notified: MERRITT Santamaria    Upon arrival to floor, pt oriented to room, VS stable.  Bed in lowest position, wheels locked, arm rails raised x 3, alarms set, call light within reach.

## 2017-01-08 VITALS
RESPIRATION RATE: 16 BRPM | HEIGHT: 63 IN | WEIGHT: 190 LBS | OXYGEN SATURATION: 98 % | SYSTOLIC BLOOD PRESSURE: 136 MMHG | BODY MASS INDEX: 33.66 KG/M2 | HEART RATE: 68 BPM | TEMPERATURE: 98 F | DIASTOLIC BLOOD PRESSURE: 70 MMHG

## 2017-01-08 LAB
ANION GAP SERPL CALC-SCNC: 7 MMOL/L
ANISOCYTOSIS BLD QL SMEAR: SLIGHT
BASOPHILS # BLD AUTO: 0 K/UL
BASOPHILS NFR BLD: 0 %
BLD PROD TYP BPU: NORMAL
BLD PROD TYP BPU: NORMAL
BLOOD UNIT EXPIRATION DATE: NORMAL
BLOOD UNIT EXPIRATION DATE: NORMAL
BLOOD UNIT TYPE CODE: 5100
BLOOD UNIT TYPE CODE: 5100
BLOOD UNIT TYPE: NORMAL
BLOOD UNIT TYPE: NORMAL
BUN SERPL-MCNC: 14 MG/DL
CALCIUM SERPL-MCNC: 9.1 MG/DL
CHLORIDE SERPL-SCNC: 103 MMOL/L
CO2 SERPL-SCNC: 25 MMOL/L
CODING SYSTEM: NORMAL
CODING SYSTEM: NORMAL
CREAT SERPL-MCNC: 0.7 MG/DL
DIFFERENTIAL METHOD: ABNORMAL
DISPENSE STATUS: NORMAL
DISPENSE STATUS: NORMAL
EOSINOPHIL # BLD AUTO: 0 K/UL
EOSINOPHIL NFR BLD: 0 %
ERYTHROCYTE [DISTWIDTH] IN BLOOD BY AUTOMATED COUNT: 19.6 %
EST. GFR  (AFRICAN AMERICAN): >60 ML/MIN/1.73 M^2
EST. GFR  (NON AFRICAN AMERICAN): >60 ML/MIN/1.73 M^2
GIANT PLATELETS BLD QL SMEAR: PRESENT
GLUCOSE SERPL-MCNC: 121 MG/DL
HCT VFR BLD AUTO: 29.5 %
HGB BLD-MCNC: 8.2 G/DL
HYPOCHROMIA BLD QL SMEAR: ABNORMAL
LYMPHOCYTES # BLD AUTO: 1.2 K/UL
LYMPHOCYTES NFR BLD: 16.2 %
MAGNESIUM SERPL-MCNC: 2.1 MG/DL
MCH RBC QN AUTO: 19.1 PG
MCHC RBC AUTO-ENTMCNC: 27.8 %
MCV RBC AUTO: 69 FL
MONOCYTES # BLD AUTO: 0.4 K/UL
MONOCYTES NFR BLD: 5.2 %
NEUTROPHILS # BLD AUTO: 5.9 K/UL
NEUTROPHILS NFR BLD: 78.6 %
PHOSPHATE SERPL-MCNC: 4 MG/DL
PLATELET # BLD AUTO: 158 K/UL
PLATELET BLD QL SMEAR: ABNORMAL
PMV BLD AUTO: ABNORMAL FL
POLYCHROMASIA BLD QL SMEAR: ABNORMAL
POTASSIUM SERPL-SCNC: 4.3 MMOL/L
RBC # BLD AUTO: 4.29 M/UL
SODIUM SERPL-SCNC: 135 MMOL/L
TRANS ERYTHROCYTES VOL PATIENT: NORMAL ML
TRANS ERYTHROCYTES VOL PATIENT: NORMAL ML
WBC # BLD AUTO: 7.48 K/UL

## 2017-01-08 PROCEDURE — 83735 ASSAY OF MAGNESIUM: CPT

## 2017-01-08 PROCEDURE — 94640 AIRWAY INHALATION TREATMENT: CPT

## 2017-01-08 PROCEDURE — 25000003 PHARM REV CODE 250: Performed by: STUDENT IN AN ORGANIZED HEALTH CARE EDUCATION/TRAINING PROGRAM

## 2017-01-08 PROCEDURE — 84100 ASSAY OF PHOSPHORUS: CPT

## 2017-01-08 PROCEDURE — 63600175 PHARM REV CODE 636 W HCPCS: Performed by: PHYSICIAN ASSISTANT

## 2017-01-08 PROCEDURE — 25000242 PHARM REV CODE 250 ALT 637 W/ HCPCS: Performed by: NEUROLOGICAL SURGERY

## 2017-01-08 PROCEDURE — 85025 COMPLETE CBC W/AUTO DIFF WBC: CPT

## 2017-01-08 PROCEDURE — 80048 BASIC METABOLIC PNL TOTAL CA: CPT

## 2017-01-08 PROCEDURE — 25000003 PHARM REV CODE 250: Performed by: PHYSICIAN ASSISTANT

## 2017-01-08 RX ORDER — HYDROCODONE BITARTRATE AND ACETAMINOPHEN 5; 325 MG/1; MG/1
1 TABLET ORAL EVERY 6 HOURS PRN
Qty: 90 TABLET | Refills: 0 | Status: SHIPPED | OUTPATIENT
Start: 2017-01-08 | End: 2018-05-29

## 2017-01-08 RX ORDER — LEVETIRACETAM 500 MG/1
500 TABLET ORAL 2 TIMES DAILY
Qty: 60 TABLET | Refills: 2 | Status: SHIPPED | OUTPATIENT
Start: 2017-01-08 | End: 2018-05-29

## 2017-01-08 RX ORDER — DEXAMETHASONE 2 MG/1
2 TABLET ORAL 2 TIMES DAILY WITH MEALS
Qty: 4 TABLET | Refills: 0 | Status: SHIPPED | OUTPATIENT
Start: 2017-01-08 | End: 2017-01-18

## 2017-01-08 RX ORDER — FERROUS SULFATE 325(65) MG
325 TABLET, DELAYED RELEASE (ENTERIC COATED) ORAL DAILY
Qty: 120 TABLET | Refills: 0 | Status: SHIPPED | OUTPATIENT
Start: 2017-01-08 | End: 2018-10-10

## 2017-01-08 RX ORDER — DEXAMETHASONE 2 MG/1
2 TABLET ORAL
Qty: 4 TABLET | Refills: 0 | Status: SHIPPED | OUTPATIENT
Start: 2017-01-08 | End: 2017-01-12

## 2017-01-08 RX ORDER — DEXAMETHASONE 4 MG/1
4 TABLET ORAL EVERY 12 HOURS
Qty: 8 TABLET | Refills: 0 | Status: SHIPPED | OUTPATIENT
Start: 2017-01-08 | End: 2017-01-12

## 2017-01-08 RX ORDER — PANTOPRAZOLE SODIUM 40 MG/1
40 TABLET, DELAYED RELEASE ORAL DAILY
Qty: 60 TABLET | Refills: 1 | Status: SHIPPED | OUTPATIENT
Start: 2017-01-08 | End: 2018-05-29

## 2017-01-08 RX ORDER — DEXAMETHASONE 4 MG/1
4 TABLET ORAL EVERY 12 HOURS
Status: DISCONTINUED | OUTPATIENT
Start: 2017-01-08 | End: 2017-01-08 | Stop reason: HOSPADM

## 2017-01-08 RX ADMIN — LEVETIRACETAM 500 MG: 500 TABLET, FILM COATED ORAL at 07:01

## 2017-01-08 RX ADMIN — IPRATROPIUM BROMIDE AND ALBUTEROL SULFATE 3 ML: .5; 3 SOLUTION RESPIRATORY (INHALATION) at 07:01

## 2017-01-08 RX ADMIN — FERROUS SULFATE TAB EC 325 MG (65 MG FE EQUIVALENT) 325 MG: 325 (65 FE) TABLET DELAYED RESPONSE at 07:01

## 2017-01-08 RX ADMIN — PANTOPRAZOLE SODIUM 40 MG: 40 TABLET, DELAYED RELEASE ORAL at 07:01

## 2017-01-08 RX ADMIN — DEXAMETHASONE 4 MG: 4 TABLET ORAL at 05:01

## 2017-01-08 RX ADMIN — IPRATROPIUM BROMIDE AND ALBUTEROL SULFATE 3 ML: .5; 3 SOLUTION RESPIRATORY (INHALATION) at 04:01

## 2017-01-08 RX ADMIN — HEPARIN SODIUM 5000 UNITS: 5000 INJECTION, SOLUTION INTRAVENOUS; SUBCUTANEOUS at 05:01

## 2017-01-08 RX ADMIN — IPRATROPIUM BROMIDE AND ALBUTEROL SULFATE 3 ML: .5; 3 SOLUTION RESPIRATORY (INHALATION) at 02:01

## 2017-01-08 NOTE — DISCHARGE SUMMARY
[facility]  Discharge Summary  Neurosurgery    Admit Date: 1/4/2017    Discharge Date and Time: 1/8/2017 8:41 AM    Attending Physician: Sudhir Larkin MD     Discharge Physician: Maximiliano Guevara    Reason for Admission: Right occipital mass    Procedures Performed: Procedure(s) (LRB):  CRANIOTOMY WITH STEALTH-for tumor resection (Right)    Hospital Course: pt presented for evaluation of right occipital mass and underwent crani and resection.  Pt tolerated the procedure well and there were no complications.  Intraop pathology was consistent with melanoma.  Pt also underwent shave biopsy of chest lesion by derm thought to be possible melanoma.  Pt has remained stable on the floor.  She is stable for dc home today to fu in clinic in 2 weeks to discuss pathology.    Consults: derm/ophtho    Significant Diagnostic Studies: na    Final Diagnoses:     Discharged Condition: good   Principal Problem: Brain tumor   Secondary Diagnoses:   Active Hospital Problems    Diagnosis  POA    *Brain tumor [D49.6]  Yes    Headache [R51]  Yes      Resolved Hospital Problems    Diagnosis Date Resolved POA   No resolved problems to display.       Disposition: Home or Self Care    Follow Up/Patient Instructions:     Medications:  Reconciled Home Medications: There are no discharge medications for this patient.    No discharge procedures on file.

## 2017-01-08 NOTE — PLAN OF CARE
SW consulted to assist pt with affording medication. SW reviewed pt's d/c medication list. SW provided pt with [employer] $4 Formulary and GoodRx coupons. Pt asked questions regarding her [place] Medicaid application and disability. SW provided basic information regarding [place] Medicaid application process and encouraged pt to follow up on her application. SW also informed pt of applying for disability online or in local social security office. Pt's family reports they have also located coupons to assist with pt's medications. Pt states she is ready for d/c and needs transport assist. Pt's nurse notified.    Mary Singh LMSW, CCM  On Call YONATAN

## 2017-01-08 NOTE — PROGRESS NOTES
Progress Note  Neurosurgery    Admit Date: 1/4/2017  Post-operative Day: 3 Days Post-Op  Hospital Day: 5    SUBJECTIVE:     More Fournier is a 36 y.o. female presenting with visual hallucinations and headache, found to have R occipital mass, now s/p craniotomy for resection. Frozen section consistent with melanin-containing lesional tissue.    MERVIN, ritove biopsy of chest yesterday with dermatology.    Follow-up For:  Procedure(s) (LRB):  CRANIOTOMY WITH STEALTH-for tumor resection (Right)      Scheduled Meds:   albuterol-ipratropium 2.5mg-0.5mg/3mL  3 mL Nebulization Q4H    cefTRIAXone (ROCEPHIN) IVPB  2 g Intravenous Q12H    dexamethasone  4 mg Oral Q6H    ferrous sulfate  325 mg Oral Daily    heparin (porcine)  5,000 Units Subcutaneous Q8H    levetiracetam  500 mg Oral BID    pantoprazole  40 mg Oral Daily     Continuous Infusions:     PRN Meds:acetaminophen, dextrose 50%, dextrose 50%, glucagon (human recombinant), glucose, glucose, glucose, hydrocodone-acetaminophen 5-325mg, insulin aspart, ondansetron    Review of patient's allergies indicates:  No Known Allergies    OBJECTIVE:     Vital Signs (Most Recent)  Temp: 97 °F (36.1 °C) (01/08/17 0400)  Pulse: 68 (01/08/17 0440)  Resp: 16 (01/08/17 0440)  BP: 128/73 (01/08/17 0400)  SpO2: 99 % (01/08/17 0440)    Vital Signs Range (Last 24H):  Temp:  [96.8 °F (36 °C)-98.6 °F (37 °C)]   Pulse:  [67-96]   Resp:  [14-18]   BP: (128-152)/(62-85)   SpO2:  [97 %-100 %]     I & O (Last 24H):  No intake or output data in the 24 hours ending 01/08/17 0708  Physical Exam:  General: well developed, well nourished. no acute distress.  Neurologic: Awake, alert and oriented x3. Thought content appropriate.  Head: normocephalic, atraumatic   GCS: Motor: 6/Verbal: 5/Eyes: 4 GCS Total: 15  Cranial nerves: face symmetric, tongue midline, pupils equal, round, reactive to light with accomodation, extraocular muscles intact. CN II-XII grossly intact.   Language: no aphasia  Speech:  no dysarthria   Sensory: response to light touch throughout  Motor Strength: Moves all extremities spontaneously with good tone. Full strength upper and lower extremities.           Pronator Drift: no drift noted  Coordination: finger to nose normal bilaterally  No focal numbness or weakness  Skin: warm, dry and intact. Concerned for lesion on chest.      Lines/Drains:       Peripheral IV - Single Lumen 01/04/17 0052 Left Antecubital (Active)   Site Assessment Clean;Dry;Intact 1/4/2017  8:11 PM   Line Status Flushed;Infusing 1/4/2017  8:11 PM   Dressing Status Clean;Dry;Intact 1/4/2017  8:11 PM   Dressing Intervention Dressing reinforced 1/4/2017  8:11 PM   Dressing Change Due 01/08/17 1/4/2017  8:11 PM   Site Change Due 01/08/17 1/4/2017  8:11 PM   Reason Not Rotated Not due 1/4/2017  8:11 PM   Number of days:1       Laboratory:  CBC:     Recent Labs  Lab 01/07/17  0330   WBC 8.95   RBC 4.19   HGB 8.0*   HCT 28.4*   *   MCV 68*   MCH 19.1*   MCHC 28.2*     BMP:     Recent Labs  Lab 01/07/17  0330   *      K 4.2      CO2 23   BUN 10   CREATININE 0.7   CALCIUM 8.9   MG 2.3     Coagulation:     Recent Labs  Lab 01/05/17  0403   INR 1.0   APTT 21.2       Diagnostic Results:  CT head: expected postoperative changes     ASSESSMENT/PLAN:     37 yo female with hx of tobacco use with R occipital mass, s/p crani for resection.    --Floor status  --PT/OT/OOB  --Fu path from shave biopsy  --Will start dex taper  --Keppra  --SBP <150  --Na 140-150   --Tolerating PO  --Duonebs and chest PT scheduled (patient is active smoker)   --Anemia: H and H stable, continue to monitor.  --TEDs/SCDs  --PPi while on Dex   --Will consider dc today.

## 2017-01-08 NOTE — PLAN OF CARE
Problem: Patient Care Overview  Goal: Plan of Care Review  Outcome: Ongoing (interventions implemented as appropriate)  POC reviewed w pt and family, who verbalized understanding. Pt VS and neuro checks remain stable and WDL. No acute events overnight. Pt remains free from fall, injury, or skin breakdown. Bed in lowest position, wheels locked, side rails raised x2, and call light w/in reach. Will continue to monitor.

## 2017-01-09 ENCOUNTER — TELEPHONE (OUTPATIENT)
Dept: OPHTHALMOLOGY | Facility: CLINIC | Age: 37
End: 2017-01-09

## 2017-01-09 NOTE — OP NOTE
DATE OF PROCEDURE:  01/05/2017.    ATTENDING PHYSICIAN:  Sudhir Larkin M.D.    PREOPERATIVE DIAGNOSIS:  Right temporoparietal brain tumor.    POSTOPERATIVE DIAGNOSIS:  Right temporoparietal brain tumor.    PROCEDURES PERFORMED:  Right temporoparietal craniotomy, excision of brain tumor   with neuronavigation and microsurgery.    SURGEON:  Sudhir Larkin M.D.    ASSISTANT:  Odilia Elliott M.D. ([facility] Resident Neurosurgery).    ANESTHESIA:  General endotracheal.    ESTIMATED BLOOD LOSS:  175 mL.    DRAINS:  TLS subgaleal.    CONDITION AT THE END OF PROCEDURE:  Satisfactory.    BRIEF HISTORY:  This 36-year-old lady with a history of anemia, experienced   formed visual images intermittently in the left visual field and then subsequent   headache.  She was seen at [facility] where CT showed a right temporal   occipital mass.  MRI shows enhancing mass in the posterior temporal area just   underneath the atrium of the ventricle, coming almost to midline.  No other   brain lesions are noted and CT of the chest, abdomen and pelvis were negative.    She does have a small pigmented lesion on her chest.    PROCEDURE IN DETAIL:  The patient had had MRI navigation sequence done.  She was   brought to the Operating Room in the supine position under premedication,   intubated and induced with general anesthesia.  A cannula was placed in the left   radial artery for continuous blood pressure monitoring.  A Mane catheter was   inserted.  Sequential compression devices were applied to legs and various   intravenous lines started.  The head was turned to the left and immobilized with   the Parsons three-point skeletal fixation device and the table rolled toward   Anesthesia with the patient carefully strapped to bring the right   parietooccipital area to the top of the field.  The navigation arc was attached   to the Parsons headrest and the head registered to the system with surface   recognition.  The tumor and suggested  approach to this were marked on the scalp.    Hair was shaved behind the right ear with the intention to bring the   craniotomy as close to the transverse sinus as possible.  This portion of the   scalp was prepped with Betadine and draped in a sterile fashion.  A horseshoe   incision beginning just behind her ear and coming back toward the occiput and   down below the area of the transverse sinus was outlined and this was injected   with 1% Xylocaine and epinephrine.  The incision was carried through the skin   and galea, bleeding controlled in the skin margins and skin flap with Yokasta   clips.  The skin was undermined a short distance in the subgaleal space and the   temporalis muscle and fascia, which were quite thin this far back, and   pericranium were cut with the Bovie cutting current, elevated with periosteal   elevator and the soft tissue retracted as a single flap with fishhook   retractors.  Using the navigation system, jyoti holes were made anteriorly and   posteriorly just above the transverse sinus.  However, the anterior jyoti hole   clearly exposed the sinus and had to be extended somewhat superiorly.  The   posterior jyoti hole was above the sinus.  A quadrilateral bone flap was then cut   with the high-speed drill, elevated and removed from the operative field.  The   lower edge along the sinus of the dura was partially opened.  The drill holes   were then made in the bone margin, the dura tacked up to these with 4-0 Nurolon   sutures.  The Stahl retractor was affixed to the [place] headrest, clean   towels placed around the craniotomy opening and the operating microscope brought   into the field.  Using the navigation system, the trajectory to the tumor was   located.  The navigation system was attached to the Zeiss microscope so that   this could be used as a navigation tool.  The dura was opened, hinged   superiorly, since it had already been opened on the inferior edge.  A small   incision  was then made in the inferior temporal gyrus and using the navigation   system, dissection carried through the white matter, limiting this to a small   2-cm opening.  Dissection led directly to the tumor, which was black and felt to   be possibly consistent with melanoma.  A specimen was sent to the laboratory   and was felt to show melanin granules without a clear diagnosis.  The tumor was   then removed with microdissection, gradually  it from the surrounding   white matter.  The tumor was quite soft and necrotic, almost liquid in some   areas, but attempt was made to remove any dark material.  With the tumor removed   with forceps and microdissection, the SonGangkr ultrasonic aspirator was then   used to gently clean the bed and be sure that only normal white matter remained.    Bleeding was controlled with bipolar coagulation and some Surgiflo.  The dura   was closed with interrupted 4-0 Nurolon sutures.  Drill holes were made in the   lower bone edge so the dura could be pulled up against this.  A piece of Duragen   was then placed over the dura and the bone flap returned in place using the   [employer] microplate system with three 4-sided boxes.  A TLS drain was placed   beneath the skin flap and brought out through a separate stab incision posterior   to the primary incision.  The temporalis fascia could be partially closed with   interrupted 3-0 Vicryl sutures anteriorly.  The galea was closed with inverted   interrupted 3-0 Vicryl sutures and the skin closed with skin staples.  A Telfa   and bacitracin dressing was placed over the wound and held in place with tape.    The red top tube was attached to the TLS drain.  The patient's head was released   from three-point skeletal fixation device.  She was returned to full supine   position, allowed to awaken from anesthesia, extubated and left the Operating   Room in satisfactory condition.  She received 12 mg of Decadron, 500 mg of   Dilantin and 2 g of  Rocephin at the beginning of the procedure and 50 g of   mannitol during the procedure and bacitracin-containing antibiotic irrigating   solutions were used throughout.      CAROL ANN/HN  dd: 01/09/2017 08:43:40 (CST)  td: 01/09/2017 09:25:03 (CST)  Doc ID   #[id number]  Job ID #[id number]    CC:

## 2017-01-10 ENCOUNTER — TELEPHONE (OUTPATIENT)
Dept: NEUROSURGERY | Facility: CLINIC | Age: 37
End: 2017-01-10

## 2017-01-10 DIAGNOSIS — T83.511A URINARY TRACT INFECTION ASSOCIATED WITH INDWELLING URETHRAL CATHETER, INITIAL ENCOUNTER: Primary | ICD-10-CM

## 2017-01-10 DIAGNOSIS — N39.0 URINARY TRACT INFECTION ASSOCIATED WITH INDWELLING URETHRAL CATHETER, INITIAL ENCOUNTER: Primary | ICD-10-CM

## 2017-01-10 RX ORDER — SULFAMETHOXAZOLE AND TRIMETHOPRIM 800; 160 MG/1; MG/1
1 TABLET ORAL 2 TIMES DAILY
Qty: 20 TABLET | Refills: 0 | Status: SHIPPED | OUTPATIENT
Start: 2017-01-10 | End: 2017-01-20

## 2017-01-10 NOTE — TELEPHONE ENCOUNTER
PT CALLED TO LET US KNOW THAT SHE HAS A UTI FROM RECENT HOSP DC AND FROM THE CATHETER. SW DR VASQUEZ, CALLED IN ABX: BACTRIM DS TO PHARMACY IN [place], [place]. PT IS AWARE.

## 2017-01-12 ENCOUNTER — TELEPHONE (OUTPATIENT)
Dept: NEUROSURGERY | Facility: CLINIC | Age: 37
End: 2017-01-12

## 2017-01-12 NOTE — TELEPHONE ENCOUNTER
YONATAN PT, DISCUSSED VISION AND EAR CHANGES, WILL SEE HER ON THE 19TH FOR HER 2 WK PO, AND WILL DISCUSS THEN. SHE IS NOT 2 WEEKS PO FROM A CRANIOTOMY, TOO EARLY TO MAKE A PROGNOSIS. SHE UNDERSTANDS THAT SHE NEEDS TO HEAL FROM SURGERY FIRST.

## 2017-01-12 NOTE — TELEPHONE ENCOUNTER
----- Message from Carlitos Mandel sent at 1/12/2017  3:22 PM CST -----  Contact: Self [phone]  Patient is requesting a return call from the nurse about movement in the left eye, pls call

## 2017-01-16 ENCOUNTER — TELEPHONE (OUTPATIENT)
Dept: NEUROSURGERY | Facility: CLINIC | Age: 37
End: 2017-01-16

## 2017-01-16 NOTE — TELEPHONE ENCOUNTER
Attempted to call pt this AM, phone is not working and am unable to reach pt or leave a message. No other number avail.

## 2017-01-16 NOTE — TELEPHONE ENCOUNTER
----- Message from Arielle Vernell sent at 1/16/2017 10:40 AM CST -----  Contact: Patient  Patient called and wanted to speak with EJ. She said she has some questions. She said she has some pressure in her ear and some water went in it and it hurts a lot.     Please [phone]

## 2017-01-19 ENCOUNTER — OFFICE VISIT (OUTPATIENT)
Dept: NEUROSURGERY | Facility: CLINIC | Age: 37
End: 2017-01-19
Payer: MEDICAID

## 2017-01-19 VITALS
TEMPERATURE: 98 F | HEIGHT: 63 IN | BODY MASS INDEX: 35.2 KG/M2 | WEIGHT: 198.69 LBS | DIASTOLIC BLOOD PRESSURE: 81 MMHG | SYSTOLIC BLOOD PRESSURE: 119 MMHG | HEART RATE: 75 BPM

## 2017-01-19 DIAGNOSIS — C43.4 MELANOMA OF PARIETAL REGION: ICD-10-CM

## 2017-01-19 DIAGNOSIS — C79.31 METASTATIC CANCER TO BRAIN: Primary | ICD-10-CM

## 2017-01-19 PROCEDURE — 99999 PR PBB SHADOW E&M-EST. PATIENT-LVL IV: CPT | Mod: PBBFAC,,, | Performed by: NEUROLOGICAL SURGERY

## 2017-01-19 PROCEDURE — 99024 POSTOP FOLLOW-UP VISIT: CPT | Mod: ,,, | Performed by: NEUROLOGICAL SURGERY

## 2017-01-19 PROCEDURE — 99214 OFFICE O/P EST MOD 30 MIN: CPT | Mod: PBBFAC | Performed by: NEUROLOGICAL SURGERY

## 2017-01-19 NOTE — MR AVS SNAPSHOT
[facility] Neurosurgery [facility]  [address]  [facility] [address]  Phone: [phone]                  More Fournier   2017 1:00 PM   Office Visit    Description:  Female : 1980   Provider:  Sudhir Larkin MD   Department:  [address]           Diagnoses this Visit        Comments    Metastatic cancer to brain    -  Primary     Melanoma of parietal region                To Do List           Future Appointments        Provider Department Dept Phone    2017 1:00 PM Sudhir Larkin MD [address] [phone]    2017 12:00 PM Sudhir Larkin MD [address] [phone]      Goals (5 Years of Data)     None      Follow-Up and Disposition     Return in about 3 months (around 2017) for MRI brain.      [facility] On Call     [facility] On Call Nurse [facility] - 24/7 Assistance  Registered nurses in the [facility] On Call Center provide clinical advisement, health education, appointment booking, and other advisory services.  Call for this free service at [phone].             Medications           Message regarding Medications     Verify the changes and/or additions to your medication regime listed below are the same as discussed with your clinician today.  If any of these changes or additions are incorrect, please notify your healthcare provider.             Verify that the below list of medications is an accurate representation of the medications you are currently taking.  If none reported, the list may be blank. If incorrect, please contact your healthcare provider. Carry this list with you in case of emergency.           Current Medications     ferrous sulfate 325 (65 FE) MG EC tablet Take 1 tablet (325 mg total) by mouth once daily.    levetiracetam (KEPPRA) 500 MG Tab Take 1 tablet (500 mg total) by mouth 2 (two) times daily.    pantoprazole (PROTONIX) 40 MG tablet Take 1 tablet (40 mg total) by mouth once daily.     "hydrocodone-acetaminophen 5-325mg (NORCO) 5-325 mg per tablet Take 1 tablet by mouth every 6 (six) hours as needed.    sulfamethoxazole-trimethoprim 800-160mg (BACTRIM DS) 800-160 mg Tab Take 1 tablet by mouth 2 (two) times daily. UTI           Clinical Reference Information           Vital Signs - Last Recorded  Most recent update: 1/19/2017 10:51 AM by Angela Newell MA    BP Pulse Temp Ht Wt LMP    119/81 75 98.3 °F (36.8 °C) 5' 3" (1.6 m) 90.1 kg (198 lb 11.2 oz) 01/04/2017 (Exact Date)    BMI                35.2 kg/m2          Blood Pressure          Most Recent Value    BP  119/81      Allergies as of 1/19/2017     No Known Allergies      Immunizations Administered on Date of Encounter - 1/19/2017     None      Orders Placed During Today's Visit      Normal Orders This Visit    Ambulatory consult to Oncology     Ambulatory Referral to Dermatology     Future Labs/Procedures Expected by Expires    MRI Brain W WO Contrast  4/19/2017 1/19/2018      Smoking Cessation     If you would like to quit smoking:   You may be eligible for free services if you are a [place] resident and started smoking cigarettes before September 1, 1988.  Call the Smoking Cessation Trust (SCT) toll free at ([phone] or ([phone] [phone].   Call 5-153-QUIT-NOW if you do not meet the above criteria.            "

## 2017-01-20 ENCOUNTER — NURSE TRIAGE (OUTPATIENT)
Dept: ADMINISTRATIVE | Facility: CLINIC | Age: 37
End: 2017-01-20

## 2017-01-21 NOTE — TELEPHONE ENCOUNTER
"    Reason for Disposition   Patient sounds very sick or weak to the triager    Answer Assessment - Initial Assessment Questions  1. DESCRIPTION: "Describe your dizziness."      Had brain surg two weeks ago. Almost feel three times today, both legs swollen from knees to ankles today, ankles a little swollen yest. Eyebrows tightening up today, dx'd with OM yest by dr ashby - neurosurg, just got insurance today   2. LIGHTHEADED: "Do you feel lightheaded?" (e.g., somewhat faint, woozy, weak upon standing)      Foods tastes salty,   3. VERTIGO: "Do you feel like either you or the room is spinning or tilting?" (i.e. vertigo)      No   4. SEVERITY: "How bad is it?"  "Do you feel like you are going to faint?" "Can you stand and walk?"    - MILD - walking normally    - MODERATE - interferes with normal activities (e.g., work, school)     - SEVERE - unable to stand, requires support to walk, feels like passing out now.       Able to stand, interfering with activities today - not constant- lasted a few minutes.   5. ONSET:  "When did the dizziness begin?"      This evening at 1pm   6. AGGRAVATING FACTORS: "Does anything make it worse?" (e.g., standing, change in head position)      Sitting makes it better , hasnt been sleeping - finished steroids yest. Only taking keepra protonix and iron   7. HEART RATE: "Can you tell me your heart rate?" "How many beats in 15 seconds?"  (Note: not all patients can do this)       Normal . Chest feels funny- near neck, back of neck is sore, jaw is sore today   8. CAUSE: "What do you think is causing the dizziness?"      sz meds,   9. RECURRENT SYMPTOM: "Have you had dizziness before?" If so, ask: "When was the last time?" "What happened that time?"      1pm today  10. OTHER SYMPTOMS: "Do you have any other symptoms?" (e.g., fever, chest pain, vomiting, diarrhea, bleeding)        No   11. PREGNANCY: "Is there any chance you are pregnant?" "When was your last menstrual period?"        " No    Protocols used: ST DIZZINESS - [id number]    Feels faint & hot- denies fever, cold chill now and then, able to wash hair today, fingers swelling from salt, no HA now, eating and drinking normal, constipated since home form hosp- BM smells bad- maybe related to iron. rec for pt to be seen this evening due to two weeks post op brain surg, swelling of legs to knees, lightheadedness since 1pm, untreated ear infection. Call back if worse, questions.

## 2017-01-23 ENCOUNTER — TELEPHONE (OUTPATIENT)
Dept: HEMATOLOGY/ONCOLOGY | Facility: CLINIC | Age: 37
End: 2017-01-23

## 2017-01-23 NOTE — TELEPHONE ENCOUNTER
----- Message from Shelia Vivas sent at 1/23/2017  3:24 PM CST -----  Contact: self  Pt is calling to change the time of her consult.    Contact number [phone]

## 2017-01-23 NOTE — TELEPHONE ENCOUNTER
----- Message from Pamella Coley sent at 1/23/2017  9:47 AM CST -----  Contact: self  Pt is calling to schedule a new consult for (metastatic cancer and Melanoma of parietal region), pt is being referred by Dr.Roger Larkin.  Contact number [phone]

## 2017-01-24 ENCOUNTER — TELEPHONE (OUTPATIENT)
Dept: HEMATOLOGY/ONCOLOGY | Facility: CLINIC | Age: 37
End: 2017-01-24

## 2017-01-24 ENCOUNTER — TELEPHONE (OUTPATIENT)
Dept: NEUROSURGERY | Facility: CLINIC | Age: 37
End: 2017-01-24

## 2017-01-24 DIAGNOSIS — C79.31 METASTATIC MELANOMA OF BRAIN: Primary | ICD-10-CM

## 2017-01-24 DIAGNOSIS — L81.8 ATYPICAL MELANOCYTIC HYPERPLASIA: ICD-10-CM

## 2017-01-24 NOTE — TELEPHONE ENCOUNTER
----- Message from Katlin Fenton sent at 1/24/2017 12:48 PM CST -----  Contact: Pt  Pt [facility] is requesting a referral and medical records    Pt contact number [phone]  Thanks

## 2017-01-24 NOTE — TELEPHONE ENCOUNTER
----- Message from Katlin Fenton sent at 1/24/2017 12:48 PM CST -----  Contact: Pt  Pt [facility] is requesting a referral and medical records    Pt contact number [phone]  Thanks

## 2017-02-07 ENCOUNTER — TELEPHONE (OUTPATIENT)
Dept: NEUROSURGERY | Facility: CLINIC | Age: 37
End: 2017-02-07

## 2017-02-07 NOTE — TELEPHONE ENCOUNTER
----- Message from Gwendolyn Bahena sent at 2/7/2017  8:20 AM CST -----  Contact: pt [phone]  Pt states she needs a PA for her medication levetiracetam (KEPPRA) 500 MG Tab.pls call               [facility] [address] [place] [address] AT [address]  [address] [address]  Phone: [phone] Fax: [phone]

## 2017-02-07 NOTE — TELEPHONE ENCOUNTER
Per pharmacist, Keppra was prescribed by Maximiliano Guevara, who is not a Medicaid prescriber. Keppra 500mg take 1 tablet by mouth 2 times/day with 2 refills called in under Dr. Sudhir Larkin. Call back number provided for questions or concerns.

## 2017-09-26 NOTE — PROGRESS NOTES
This office note has been dictated.  More Fournier was seen in neurosurgical followup at the office this morning.  She   is a 36-year-old lady who experienced onset of intermittent visual images in   the left visual field associated with headache.  She was seen at [facility] in [place] where a CT of the head showed a right temporal   occipital mass.  She was transferred to [facility] on 01/04/17 where   MRI showed a contrast enhancing tumor in the right parietooccipital area.  She   underwent craniotomy and excision of the tumor on 01/05/17.  It was noted that   the tumor was very dark and subsequent pathology confirmed this to be melanoma.    She had a small lesion on her chest.  This was biopsied by Dermatology and felt   to have melanotic features, but did not meet full criteria for invasive   melanoma.  Since being home, she has had some left-sided visual loss.  This   seems to be intermittent.  She has had no new speech difficulty, no weakness or   gait instability.  She returns today for staple removal.    On brief examination today, she is alert and cooperative.  Her wound is well   healed and the staples were removed.  She shows good extraocular movements.  She   could count fingers in the left peripheral fields and saw fingers moving.  She   has some complaint of right ear pain.  The right ear canal is somewhat inflamed   and excoriated.  The tympanic membrane is intact.  Neurological examination is   otherwise unremarkable.    She will need Dermatology and Oncology followup and I will try to arrange this   for her.  I would like to see her back in three months with MRI of the brain,   which will serve as a baseline for us.  The visual hallucinations were thought   to probably be a seizure equivalent and she remains on Keppra for now.      RDS/HN  dd: 01/19/2017 11:23:47 (CST)  td: 01/19/2017 23:38:26 (CST)  Doc ID   #[id number]  Job ID #[id number]    CC: More Fournier       
How Severe Is Your Condition?: mild

## 2018-04-10 ENCOUNTER — LAB VISIT (OUTPATIENT)
Dept: LAB | Facility: HOSPITAL | Age: 38
End: 2018-04-10
Attending: OBSTETRICS & GYNECOLOGY
Payer: MEDICAID

## 2018-04-10 DIAGNOSIS — N92.1 METRORRHAGIA: ICD-10-CM

## 2018-04-10 DIAGNOSIS — D64.9 ANEMIA: ICD-10-CM

## 2018-04-10 DIAGNOSIS — N92.0 MENORRHAGIA: Primary | ICD-10-CM

## 2018-04-10 LAB
ANION GAP SERPL CALC-SCNC: 10 MMOL/L
APTT BLDCRRT: 30 SEC
B-HCG UR QL: NEGATIVE
BACTERIA #/AREA URNS HPF: ABNORMAL /HPF
BILIRUB UR QL STRIP: ABNORMAL
BUN SERPL-MCNC: 6 MG/DL
CALCIUM SERPL-MCNC: 8.7 MG/DL
CHLORIDE SERPL-SCNC: 104 MMOL/L
CLARITY UR: ABNORMAL
CO2 SERPL-SCNC: 23 MMOL/L
COLOR UR: ABNORMAL
CREAT SERPL-MCNC: 0.5 MG/DL
ERYTHROCYTE [DISTWIDTH] IN BLOOD BY AUTOMATED COUNT: 16.9 %
EST. GFR  (AFRICAN AMERICAN): >60 ML/MIN/1.73 M^2
EST. GFR  (NON AFRICAN AMERICAN): >60 ML/MIN/1.73 M^2
GLUCOSE SERPL-MCNC: 78 MG/DL
GLUCOSE UR QL STRIP: NEGATIVE
HCT VFR BLD AUTO: 30.8 %
HGB BLD-MCNC: 9.2 G/DL
HGB UR QL STRIP: ABNORMAL
HYALINE CASTS #/AREA URNS LPF: ABNORMAL /LPF
INR PPP: 1.2
KETONES UR QL STRIP: NEGATIVE
LEUKOCYTE ESTERASE UR QL STRIP: ABNORMAL
MCH RBC QN AUTO: 30.2 PG
MCHC RBC AUTO-ENTMCNC: 29.9 G/DL
MCV RBC AUTO: 101 FL
MICROSCOPIC COMMENT: ABNORMAL
NITRITE UR QL STRIP: NEGATIVE
PH UR STRIP: 6.5 [PH] (ref 5–8)
PLATELET # BLD AUTO: 187 K/UL
PMV BLD AUTO: 11.2 FL
POTASSIUM SERPL-SCNC: 3.7 MMOL/L
PROT UR QL STRIP: ABNORMAL
PROTHROMBIN TIME: 13.7 SEC
RBC # BLD AUTO: 3.05 M/UL
RBC #/AREA URNS HPF: 100 /HPF (ref 0–4)
SODIUM SERPL-SCNC: 137 MMOL/L
SP GR UR STRIP: 1.02 (ref 1–1.03)
URN SPEC COLLECT METH UR: ABNORMAL
UROBILINOGEN UR STRIP-ACNC: 0.2 EU/DL
WBC # BLD AUTO: 4.13 K/UL
WBC #/AREA URNS HPF: 3 /HPF (ref 0–5)
WBC CLUMPS URNS QL MICRO: ABNORMAL

## 2018-04-10 PROCEDURE — 81000 URINALYSIS NONAUTO W/SCOPE: CPT

## 2018-04-10 PROCEDURE — 86880 COOMBS TEST DIRECT: CPT

## 2018-04-10 PROCEDURE — 86922 COMPATIBILITY TEST ANTIGLOB: CPT

## 2018-04-10 PROCEDURE — 36415 COLL VENOUS BLD VENIPUNCTURE: CPT

## 2018-04-10 PROCEDURE — 86902 BLOOD TYPE ANTIGEN DONOR EA: CPT

## 2018-04-10 PROCEDURE — 85730 THROMBOPLASTIN TIME PARTIAL: CPT

## 2018-04-10 PROCEDURE — 86850 RBC ANTIBODY SCREEN: CPT

## 2018-04-10 PROCEDURE — 86900 BLOOD TYPING SEROLOGIC ABO: CPT

## 2018-04-10 PROCEDURE — 80048 BASIC METABOLIC PNL TOTAL CA: CPT

## 2018-04-10 PROCEDURE — 85610 PROTHROMBIN TIME: CPT

## 2018-04-10 PROCEDURE — 85027 COMPLETE CBC AUTOMATED: CPT

## 2018-04-10 PROCEDURE — 86870 RBC ANTIBODY IDENTIFICATION: CPT

## 2018-04-10 PROCEDURE — 81025 URINE PREGNANCY TEST: CPT

## 2018-04-10 PROCEDURE — 86901 BLOOD TYPING SEROLOGIC RH(D): CPT

## 2018-04-10 PROCEDURE — 86906 BLD TYPING SEROLOGIC RH PHNT: CPT

## 2018-04-10 RX ORDER — CIPROFLOXACIN 2 MG/ML
400 INJECTION, SOLUTION INTRAVENOUS
Status: CANCELLED | OUTPATIENT
Start: 2018-04-10

## 2018-04-10 RX ORDER — CLINDAMYCIN PHOSPHATE 900 MG/50ML
900 INJECTION, SOLUTION INTRAVENOUS
Status: CANCELLED | OUTPATIENT
Start: 2018-04-10

## 2018-04-10 NOTE — SUBJECTIVE & OBJECTIVE
[facility] - Periop Services    Admit Date: (Not on file)    Reason for Admission: [unfilled]     History of Present Illness  More Fournier is a 37 y.o. y.o.  w menometro unresponsive to provera.  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 Cleared for surgery    PAST MEDICAL HISTORY:  Past Medical History:   Diagnosis Date    Anemia     Malignant melanoma metastatic to brain     Menometrorrhagia     Prolapsed uterus         Past Surgical History:   Procedure Laterality Date    crainio      CRANIOTOMY      TUBAL LIGATION          CURRENT MEDICATIONS AND ALLERGIES:  Home Medications:   Prior to Admission medications    Medication Sig Start Date End Date Taking? Authorizing Provider   ferrous sulfate 325 (65 FE) MG EC tablet Take 1 tablet (325 mg total) by mouth once daily. 17   Maximiliano Guevara DO   hydrocodone-acetaminophen 5-325mg (NORCO) 5-325 mg per tablet Take 1 tablet by mouth every 6 (six) hours as needed. 17   Maximiliano Guevara DO   levetiracetam (KEPPRA) 500 MG Tab Take 1 tablet (500 mg total) by mouth 2 (two) times daily. 17  Maximiliano Guevara DO   pantoprazole (PROTONIX) 40 MG tablet Take 1 tablet (40 mg total) by mouth once daily. 17  Maximiliano Guevara DO        Patient has no known allergies.     Family History   Problem Relation Age of Onset    Hypertension Mother     Heart disease Mother     Breast cancer Maternal Grandmother     Uterine cancer Maternal Grandmother          reports that she has been smoking Cigarettes.  She has a 48.00 pack-year smoking history. She does not have any smokeless tobacco history on file. She reports that she does not drink alcohol or use drugs.     Data Unavailable     COMPREHENSIVE GYN HISTORY:  PAP History: Denies abnormal Paps.  Infection History: Denies STDs. Denies PID.  Benign History: Denies uterine fibroids. Denies ovarian cysts. Denies  endometriosis. Denies other conditions.  Cancer History: Denies cervical cancer. Denies uterine cancer or hyperplasia. Denies ovarian cancer. Denies vulvar cancer or pre-cancer. Denies vaginal cancer or pre-cancer. Denies breast cancer. Denies colon cancer.  Sexual Activity History: Reports currently being sexually active  Menstrual History: Every days, flows for days. (Light, Mod, Heavy) flow.   Contraception:    ROS:   GENERAL: No weight changes. No swelling. No fatigue. No fever.   CARDIOVASCULAR: No chest pain. No shortness of breath. No leg cramps.   NEUROLOGICAL: No headaches. No vision changes.   BREASTS: No pain. No lumps. No discharge.   ABDOMEN: No pain. No nausea. No vomiting. No diarrhea. No constipation.   REPRODUCTIVE: No abnormal bleeding.   VULVA: No pain. No lesions. No itching.   VAGINA: No relaxation. No itching. No odor. No discharge. No lesions.   URINARY: No incontinence. No nocturia. No frequency. No dysuria.    There were no vitals taken for this visit.     PE:   APPEARANCE: Well nourished, well developed, in no acute distress.   AFFECT: WNL, alert and oriented x 3.   SKIN: No acne or hirsutism.   NECK: Neck symmetric, without masses or thyromegaly.   NODES: No inguinal, cervical, axillary or femoral lymph node enlargement.   CHEST: Good respiratory effort.   ABDOMEN: Soft. No tenderness or masses. No hepatosplenomegaly. No hernias. Midline scare form    BREASTS: Symmetrical, no skin changes, visible lesions, palpable masses or nipple discharge bilaterally.   PELVIC: External female genitalia without lesions. Female hair distribution. Adequate perineal body, Normal urethral meatus. Vagina moist and well rugated without lesions or discharge. No significant cystocele or rectocele present. Cervix pink without lesions, discharge or tenderness. Uterus is 14-16 week size, regular, mobile and nontender. Adnexa without masses or tenderness.   EXTREMITIES: No edema    PROCEDURES: courtney moe  and c, mirena placemnt      DIAGNOSIS anovulatory bleed.     COUNSELING:surgical counselling risk and beifit discussed      ASSESSMENT/PLAN:payal d and c, dennis Davis  Obstetric History     No data available        Past Medical History:   Diagnosis Date    Anemia     Malignant melanoma metastatic to brain     Menometrorrhagia     Prolapsed uterus      Past Surgical History:   Procedure Laterality Date    crainio      CRANIOTOMY      TUBAL LIGATION         No prescriptions prior to admission.       Review of patient's allergies indicates:  No Known Allergies     Family History     Problem Relation (Age of Onset)    Breast cancer Maternal Grandmother    Heart disease Mother    Hypertension Mother    Uterine cancer Maternal Grandmother        Social History Main Topics    Smoking status: Current Every Day Smoker     Packs/day: 2.00     Years: 24.00     Types: Cigarettes    Smokeless tobacco: Not on file    Alcohol use No      Comment: occasional    Drug use: No    Sexual activity: Not Currently     Review of Systems   Constitutional: Negative.    HENT: Negative.    Eyes: Negative.    Respiratory: Negative.    Cardiovascular: Negative.    Gastrointestinal: Negative.    Genitourinary: Positive for menorrhagia.   Musculoskeletal: Negative.    Skin:         melanoma   Hematological:        Anemia   Psychiatric/Behavioral: Positive for depression.      Objective:     Vital Signs (Most Recent):    Vital Signs (24h Range):  BP: ()/()   Arterial Line BP: ()/()         There is no height or weight on file to calculate BMI.    No LMP recorded.    Physical Exam:   Constitutional: She appears well-developed.    HENT:   Head: Normocephalic and atraumatic.    Eyes: Pupils are equal, round, and reactive to light.    Neck: Normal range of motion.    Cardiovascular: Normal rate and regular rhythm.     Pulmonary/Chest: Effort normal and breath sounds normal.        Abdominal: Soft.      Genitourinary: Vagina normal and uterus normal.                     Laboratory:  I have personallly reviewed all pertinent lab results from the last 24 hours.    Diagnostic Results:

## 2018-04-10 NOTE — HPI
38 yo  w menometro unresponsive to provera.  emb proliferative.  Currently in treatment for metastatic melanoma to brain and lung.  Cleared for surgery by hem onc.

## 2018-04-11 ENCOUNTER — ANESTHESIA (OUTPATIENT)
Dept: SURGERY | Facility: HOSPITAL | Age: 38
End: 2018-04-11
Payer: MEDICAID

## 2018-04-11 ENCOUNTER — HOSPITAL ENCOUNTER (OUTPATIENT)
Facility: HOSPITAL | Age: 38
Discharge: HOME | End: 2018-04-11
Attending: OBSTETRICS & GYNECOLOGY | Admitting: OBSTETRICS & GYNECOLOGY
Payer: MEDICAID

## 2018-04-11 ENCOUNTER — ANESTHESIA EVENT (OUTPATIENT)
Dept: SURGERY | Facility: HOSPITAL | Age: 38
End: 2018-04-11
Payer: MEDICAID

## 2018-04-11 VITALS
WEIGHT: 155 LBS | HEIGHT: 63 IN | SYSTOLIC BLOOD PRESSURE: 119 MMHG | TEMPERATURE: 98 F | RESPIRATION RATE: 19 BRPM | BODY MASS INDEX: 27.46 KG/M2 | DIASTOLIC BLOOD PRESSURE: 72 MMHG | HEART RATE: 87 BPM | OXYGEN SATURATION: 95 %

## 2018-04-11 DIAGNOSIS — N92.1 MENOMETRORRHAGIA: Primary | ICD-10-CM

## 2018-04-11 LAB
ABO + RH BLD: NORMAL
BLD GP AB SCN CELLS X3 SERPL QL: NORMAL
BLOOD GROUP ANTIBODIES SERPL: NORMAL

## 2018-04-11 PROCEDURE — 63600175 PHARM REV CODE 636 W HCPCS: Performed by: NURSE ANESTHETIST, CERTIFIED REGISTERED

## 2018-04-11 PROCEDURE — 71000015 HC POSTOP RECOV 1ST HR: Performed by: OBSTETRICS & GYNECOLOGY

## 2018-04-11 PROCEDURE — 88305 TISSUE EXAM BY PATHOLOGIST: CPT | Mod: 26,,, | Performed by: PATHOLOGY

## 2018-04-11 PROCEDURE — 88305 TISSUE EXAM BY PATHOLOGIST: CPT | Performed by: PATHOLOGY

## 2018-04-11 PROCEDURE — 25000003 PHARM REV CODE 250: Performed by: OBSTETRICS & GYNECOLOGY

## 2018-04-11 PROCEDURE — 36000706: Performed by: OBSTETRICS & GYNECOLOGY

## 2018-04-11 PROCEDURE — 63600175 PHARM REV CODE 636 W HCPCS: Performed by: OBSTETRICS & GYNECOLOGY

## 2018-04-11 PROCEDURE — 25000003 PHARM REV CODE 250

## 2018-04-11 PROCEDURE — D9220A PRA ANESTHESIA: Mod: ANES,,, | Performed by: ANESTHESIOLOGY

## 2018-04-11 PROCEDURE — S0028 INJECTION, FAMOTIDINE, 20 MG: HCPCS

## 2018-04-11 PROCEDURE — 37000009 HC ANESTHESIA EA ADD 15 MINS: Performed by: OBSTETRICS & GYNECOLOGY

## 2018-04-11 PROCEDURE — 36000707: Performed by: OBSTETRICS & GYNECOLOGY

## 2018-04-11 PROCEDURE — 71000033 HC RECOVERY, INTIAL HOUR: Performed by: OBSTETRICS & GYNECOLOGY

## 2018-04-11 PROCEDURE — 37000008 HC ANESTHESIA 1ST 15 MINUTES: Performed by: OBSTETRICS & GYNECOLOGY

## 2018-04-11 PROCEDURE — D9220A PRA ANESTHESIA: Mod: CRNA,,, | Performed by: NURSE ANESTHETIST, CERTIFIED REGISTERED

## 2018-04-11 RX ORDER — MIDAZOLAM HYDROCHLORIDE 1 MG/ML
INJECTION, SOLUTION INTRAMUSCULAR; INTRAVENOUS
Status: DISCONTINUED | OUTPATIENT
Start: 2018-04-11 | End: 2018-04-11

## 2018-04-11 RX ORDER — ONDANSETRON 2 MG/ML
INJECTION INTRAMUSCULAR; INTRAVENOUS
Status: DISCONTINUED | OUTPATIENT
Start: 2018-04-11 | End: 2018-04-11

## 2018-04-11 RX ORDER — DIPHENHYDRAMINE HYDROCHLORIDE 50 MG/ML
12.5 INJECTION INTRAMUSCULAR; INTRAVENOUS
Status: DISCONTINUED | OUTPATIENT
Start: 2018-04-11 | End: 2018-04-11 | Stop reason: HOSPADM

## 2018-04-11 RX ORDER — SODIUM CHLORIDE, SODIUM LACTATE, POTASSIUM CHLORIDE, CALCIUM CHLORIDE 600; 310; 30; 20 MG/100ML; MG/100ML; MG/100ML; MG/100ML
125 INJECTION, SOLUTION INTRAVENOUS CONTINUOUS
Status: DISCONTINUED | OUTPATIENT
Start: 2018-04-11 | End: 2018-04-11 | Stop reason: HOSPADM

## 2018-04-11 RX ORDER — LIDOCAINE HYDROCHLORIDE 10 MG/ML
1 INJECTION, SOLUTION EPIDURAL; INFILTRATION; INTRACAUDAL; PERINEURAL ONCE
Status: DISCONTINUED | OUTPATIENT
Start: 2018-04-11 | End: 2018-04-11

## 2018-04-11 RX ORDER — CEFAZOLIN SODIUM 2 G/50ML
2 SOLUTION INTRAVENOUS
Status: COMPLETED | OUTPATIENT
Start: 2018-04-11 | End: 2018-04-11

## 2018-04-11 RX ORDER — MORPHINE SULFATE 4 MG/ML
2 INJECTION, SOLUTION INTRAMUSCULAR; INTRAVENOUS EVERY 5 MIN PRN
Status: DISCONTINUED | OUTPATIENT
Start: 2018-04-11 | End: 2018-04-11 | Stop reason: HOSPADM

## 2018-04-11 RX ORDER — FAMOTIDINE 20 MG/50ML
INJECTION, SOLUTION INTRAVENOUS
Status: COMPLETED
Start: 2018-04-11 | End: 2018-04-11

## 2018-04-11 RX ORDER — FAMOTIDINE 20 MG/50ML
20 INJECTION, SOLUTION INTRAVENOUS ONCE
Status: DISCONTINUED | OUTPATIENT
Start: 2018-04-11 | End: 2018-04-11

## 2018-04-11 RX ORDER — SODIUM CHLORIDE, SODIUM LACTATE, POTASSIUM CHLORIDE, CALCIUM CHLORIDE 600; 310; 30; 20 MG/100ML; MG/100ML; MG/100ML; MG/100ML
INJECTION, SOLUTION INTRAVENOUS CONTINUOUS
Status: DISCONTINUED | OUTPATIENT
Start: 2018-04-11 | End: 2018-04-11 | Stop reason: HOSPADM

## 2018-04-11 RX ORDER — FAMOTIDINE 10 MG/ML
20 INJECTION INTRAVENOUS ONCE
Status: DISCONTINUED | OUTPATIENT
Start: 2018-04-11 | End: 2018-04-11

## 2018-04-11 RX ORDER — PROPOFOL 10 MG/ML
VIAL (ML) INTRAVENOUS
Status: DISCONTINUED | OUTPATIENT
Start: 2018-04-11 | End: 2018-04-11

## 2018-04-11 RX ORDER — ONDANSETRON 2 MG/ML
4 INJECTION INTRAMUSCULAR; INTRAVENOUS DAILY PRN
Status: DISCONTINUED | OUTPATIENT
Start: 2018-04-11 | End: 2018-04-11 | Stop reason: HOSPADM

## 2018-04-11 RX ORDER — MEPERIDINE HYDROCHLORIDE 50 MG/ML
INJECTION INTRAMUSCULAR; INTRAVENOUS; SUBCUTANEOUS
Status: DISCONTINUED | OUTPATIENT
Start: 2018-04-11 | End: 2018-04-11

## 2018-04-11 RX ADMIN — ONDANSETRON 4 MG: 2 INJECTION INTRAMUSCULAR; INTRAVENOUS at 08:04

## 2018-04-11 RX ADMIN — CEFAZOLIN SODIUM 2 G: 2 SOLUTION INTRAVENOUS at 08:04

## 2018-04-11 RX ADMIN — FAMOTIDINE 20 MG: 20 INJECTION, SOLUTION INTRAVENOUS at 07:04

## 2018-04-11 RX ADMIN — SODIUM CHLORIDE, POTASSIUM CHLORIDE, SODIUM LACTATE AND CALCIUM CHLORIDE: 600; 310; 30; 20 INJECTION, SOLUTION INTRAVENOUS at 07:04

## 2018-04-11 RX ADMIN — PROPOFOL 120 MG: 10 INJECTION, EMULSION INTRAVENOUS at 08:04

## 2018-04-11 RX ADMIN — MEPERIDINE HYDROCHLORIDE 25 MG: 50 INJECTION INTRAMUSCULAR; INTRAVENOUS; SUBCUTANEOUS at 08:04

## 2018-04-11 RX ADMIN — MIDAZOLAM HYDROCHLORIDE 2 MG: 1 INJECTION, SOLUTION INTRAMUSCULAR; INTRAVENOUS at 08:04

## 2018-04-11 NOTE — H&P
[facility] Periop Services    Admit Date: 2018    Reason for Admission: menometrorrhagia, malignant melanoma w brain and lung metastisis    History of Present Illness  More Fournier is a 37 y.o. y.o.  w menometro unresponsive to provera.  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 Cleared for surgery    PAST MEDICAL HISTORY:  Past Medical History:   Diagnosis Date    Anemia     Malignant melanoma metastatic to brain     Menometrorrhagia     Prolapsed uterus         Past Surgical History:   Procedure Laterality Date    crainio      CRANIOTOMY      TUBAL LIGATION          CURRENT MEDICATIONS AND ALLERGIES:  Home Medications:   Prior to Admission medications    Medication Sig Start Date End Date Taking? Authorizing Provider   ferrous sulfate 325 (65 FE) MG EC tablet Take 1 tablet (325 mg total) by mouth once daily. 17   Maximiliano Guevara DO   hydrocodone-acetaminophen 5-325mg (NORCO) 5-325 mg per tablet Take 1 tablet by mouth every 6 (six) hours as needed. 17   Maximiliano Guevara DO   levetiracetam (KEPPRA) 500 MG Tab Take 1 tablet (500 mg total) by mouth 2 (two) times daily. 17  Maximiliano Guevara DO   pantoprazole (PROTONIX) 40 MG tablet Take 1 tablet (40 mg total) by mouth once daily. 17  Maximiliano Guevara DO        Patient has no known allergies.     Family History   Problem Relation Age of Onset    Hypertension Mother     Heart disease Mother     Breast cancer Maternal Grandmother     Uterine cancer Maternal Grandmother          reports that she has been smoking Cigarettes.  She has a 48.00 pack-year smoking history. She does not have any smokeless tobacco history on file. She reports that she does not drink alcohol or use drugs.     Data Unavailable     COMPREHENSIVE GYN HISTORY:  PAP History: Denies abnormal Paps.  Infection History: Denies STDs. Denies PID.  Benign History: Denies uterine  fibroids. Denies ovarian cysts. Denies endometriosis. Denies other conditions.  Cancer History: Denies cervical cancer. Denies uterine cancer or hyperplasia. Denies ovarian cancer. Denies vulvar cancer or pre-cancer. Denies vaginal cancer or pre-cancer. Denies breast cancer. Denies colon cancer.  Sexual Activity History: Reports currently being sexually active  Menstrual History: Every days, flows for days. (Light, Mod, Heavy) flow.   Contraception:    ROS:   GENERAL: No weight changes. No swelling. No fatigue. No fever.   CARDIOVASCULAR: No chest pain. No shortness of breath. No leg cramps.   NEUROLOGICAL: No headaches. No vision changes.   BREASTS: No pain. No lumps. No discharge.   ABDOMEN: No pain. No nausea. No vomiting. No diarrhea. No constipation.   REPRODUCTIVE: No abnormal bleeding.   VULVA: No pain. No lesions. No itching.   VAGINA: No relaxation. No itching. No odor. No discharge. No lesions.   URINARY: No incontinence. No nocturia. No frequency. No dysuria.    There were no vitals taken for this visit.     PE:   APPEARANCE: Well nourished, well developed, in no acute distress.   AFFECT: WNL, alert and oriented x 3.   SKIN: No acne or hirsutism.   NECK: Neck symmetric, without masses or thyromegaly.   NODES: No inguinal, cervical, axillary or femoral lymph node enlargement.   CHEST: Good respiratory effort.   ABDOMEN: Soft. No tenderness or masses. No hepatosplenomegaly. No hernias. Midline scare form    BREASTS: Symmetrical, no skin changes, visible lesions, palpable masses or nipple discharge bilaterally.   PELVIC: External female genitalia without lesions. Female hair distribution. Adequate perineal body, Normal urethral meatus. Vagina moist and well rugated without lesions or discharge. No significant cystocele or rectocele present. Cervix pink without lesions, discharge or tenderness. Uterus is 14-16 week size, regular, mobile and nontender. Adnexa without masses or tenderness.    EXTREMITIES: No edema    PROCEDURES: hscope , d and c, mirena placemnt      DIAGNOSIS anovulatory bleed.     COUNSELING:surgical counselling risk and beifit discussed      ASSESSMENT/PLAN:courtney moe and c, dennis Davis  Obstetric History     No data available        Past Medical History:   Diagnosis Date    Anemia     Malignant melanoma metastatic to brain     Menometrorrhagia     Prolapsed uterus      Past Surgical History:   Procedure Laterality Date    crainio      CRANIOTOMY      TUBAL LIGATION         No prescriptions prior to admission.       Review of patient's allergies indicates:  No Known Allergies     Family History     Problem Relation (Age of Onset)    Breast cancer Maternal Grandmother    Heart disease Mother    Hypertension Mother    Uterine cancer Maternal Grandmother        Social History Main Topics    Smoking status: Current Every Day Smoker     Packs/day: 2.00     Years: 24.00     Types: Cigarettes    Smokeless tobacco: Not on file    Alcohol use No      Comment: occasional    Drug use: No    Sexual activity: Not Currently     Review of Systems   Constitutional: Negative.    HENT: Negative.    Eyes: Negative.    Respiratory: Negative.    Cardiovascular: Negative.    Gastrointestinal: Negative.    Genitourinary: Positive for menorrhagia.   Musculoskeletal: Negative.    Skin:         melanoma   Hematological:        Anemia   Psychiatric/Behavioral: Positive for depression.      Objective:     Vital Signs (Most Recent):    Vital Signs (24h Range):  BP: ()/()   Arterial Line BP: ()/()         There is no height or weight on file to calculate BMI.    No LMP recorded.    Physical Exam:   Constitutional: She appears well-developed.    HENT:   Head: Normocephalic and atraumatic.    Eyes: Pupils are equal, round, and reactive to light.    Neck: Normal range of motion.    Cardiovascular: Normal rate and regular rhythm.     Pulmonary/Chest: Effort normal and breath sounds  normal.        Abdominal: Soft.     Genitourinary: Vagina normal and uterus normal.                     Laboratory:  I have personallly reviewed all pertinent lab results from the last 24 hours.

## 2018-04-11 NOTE — TRANSFER OF CARE
"Anesthesia Transfer of Care Note    Patient: More Fournier    Procedure(s) Performed: Procedure(s) (LRB):  [id number] (N/A)  PLACEMENT INTRAUTERINE DEVICE (IUD) (N/A)    Patient location: PACU    Transport from OR: Transported from OR on room air with adequate spontaneous ventilation    Post pain: adequate analgesia    Post assessment: no apparent anesthetic complications and tolerated procedure well    Post vital signs: stable    Level of consciousness: sedated and responds to stimulation    Nausea/Vomiting: no nausea/vomiting    Complications: none    Transfer of care protocol was followedComments: Placed on 3L Nc,      Last vitals:   Visit Vitals  BP (!) 99/53 (BP Location: Right arm, Patient Position: Lying)   Pulse 86   Temp 36.6 °C (97.8 °F) (Oral)   Resp 20   Ht 5' 2.5" (1.588 m)   Wt 70.3 kg (155 lb)   LMP 04/11/2018   SpO2 99%   Breastfeeding? No   BMI 27.90 kg/m²     "

## 2018-04-11 NOTE — OP NOTE
DATE OF PROCEDURE:  04/11/2018.    SURGEON:  Se Davis MD    ANESTHESIOLOGIST:  Johnny Corado MD    ANESTHESIA:  General.    PREOPERATIVE DIAGNOSES:  Menometrorrhagia, anemia, metastatic melanoma to  the brain and lungs.    POSTOPERATIVE DIAGNOSES:  Menometrorrhagia, anemia, metastatic melanoma to  the brain and lungs with a dark pigmented lesion on the surface of the  endometrium.    PROCEDURE:  Dilatation and curettage, hysteroscopy, and Mirena insertion.    ESTIMATED BLOOD LOSS:  50 mL.    FLUID DEFICIT:  Normal saline, approximately 100 mL.    PROCEDURE IN DETAIL:  After appropriate consents were obtained, the patient  was taken to the operating room, given general anesthesia with endotracheal  intubation.  The patient was placed in Anshu stirrups, prepped and draped  in usual fashion for vaginal surgery.  Bladder was emptied with a red  rubber catheter.  Examination under anesthesia revealed a uterus that was  normal size, shape and position.  Adnexa revealed no masses.  A 10-toothed  tenaculum grasped anterior lip of cervix.  Cervix was dilated to #15 Brenton  dilator and curetted with an endocervical curette followed by an  endometrial curette until a gritty sensation was felt throughout.  The  uterus sounded to 10 cm.  A hysteroscope was then inserted and further  curettage was performed x3 until all the tissue was removed.  There was  dark pigmented area on the anterior surface of the uterus near the fundus  approximately 3-5 mm in size, did not appear to be a perforation.  The  Mirena was then inserted.  Hysteroscope was reinserted.  Good placement was  noted by the arms and the cornual areas.  The string was cut at 4 cm.  The  tissue was submitted to Pathology.  Dr. Lockett was consulted and the  findings were communicated.  The patient has a return appointment to see  Dr. Lockett in two weeks with the pathology report at which time the  pathology report should be completed.        DY/IN dd:  04/11/2018 09:33:50 (CDT)   td: 04/11/2018 13:03:05 (WILLIE)  Doc ID #[id number]   Job ID #[id number]    CC:

## 2018-04-11 NOTE — ANESTHESIA POSTPROCEDURE EVALUATION
"Anesthesia Post Evaluation    Patient: More Fournier    Procedure(s) Performed: Procedure(s) (LRB):  [id number] (N/A)  PLACEMENT INTRAUTERINE DEVICE (IUD) (N/A)    Final Anesthesia Type: general  Patient location during evaluation: PACU  Patient participation: Yes- Able to Participate  Level of consciousness: awake and alert  Pain management: adequate  Airway patency: patent  PONV status at discharge: No PONV  Anesthetic complications: no      Cardiovascular status: blood pressure returned to baseline  Respiratory status: unassisted  Hydration status: euvolemic  Follow-up not needed.        Visit Vitals  /72   Pulse 87   Temp 36.6 °C (97.8 °F) (Oral)   Resp 19   Ht 5' 2.5" (1.588 m)   Wt 70.3 kg (155 lb)   LMP 04/11/2018   SpO2 95%   Breastfeeding? No   BMI 27.90 kg/m²       Pain/Marcella Score: Pain Assessment Performed: Yes (4/11/2018  9:58 AM)  Presence of Pain: denies (4/11/2018  9:58 AM)  Marcella Score: 10 (4/11/2018  9:55 AM)  Modified Marcella Score: 20 (4/11/2018  9:55 AM)      "

## 2018-04-11 NOTE — ANESTHESIA PREPROCEDURE EVALUATION
04/11/2018  More Fournier is a 37 y.o., female.    Anesthesia Evaluation    I have reviewed the Patient Summary Reports.    I have reviewed the Nursing Notes.   I have reviewed the Medications.     Review of Systems  Anesthesia Hx:  No problems with previous Anesthesia    Social:  Smoker    Hematology/Oncology:        Oncology Comments: Metastatic melanoma to brain> Craniotomy 1 year ago.   Neurological:  Dx of Headaches Seizure Disorder , Most recent seizure occurred >1 year ago        Physical Exam  General:  Well nourished    Airway/Jaw/Neck:  Airway Findings: Mouth Opening: Normal Tongue: Normal  General Airway Assessment: Adult  Mallampati: III  Improves to III with phonation.  TM Distance: 4 - 6 cm  Jaw/Neck Findings:  Neck ROM: Normal ROM      Dental:  Dental Findings: Upper Dentures, Lower Dentures        Mental Status:  Mental Status Findings:  Cooperative, Alert and Oriented         Anesthesia Plan  Type of Anesthesia, risks & benefits discussed:  Anesthesia Type:  general  Patient's Preference:   Intra-op Monitoring Plan: standard ASA monitors  Intra-op Monitoring Plan Comments:   Post Op Pain Control Plan: IV/PO Opioids PRN  Post Op Pain Control Plan Comments:   Induction:    Beta Blocker:  Patient is not currently on a Beta-Blocker (No further documentation required).       Informed Consent: Patient understands risks and agrees with Anesthesia plan.  Questions answered. Anesthesia consent signed with patient.  ASA Score: 3     Day of Surgery Review of History & Physical: I have interviewed and examined the patient. I have reviewed the patient's H&P dated:            Ready For Surgery From Anesthesia Perspective.

## 2018-04-11 NOTE — BRIEF OP NOTE
[facility] - Periop Services  Brief Operative Note     SUMMARY     Surgery Date: 4/11/2018     Surgeon(s) and Role:     * Se Davis MD - Primary    Assisting Surgeon: None    Pre-op Diagnosis:  Menometrorrhagia [N92.1]  Encounter for insertion of mirena IUD [Z30.430]    Post-op Diagnosis:  Post-Op Diagnosis Codes:     * Menometrorrhagia [N92.1]     * Encounter for insertion of mirena IUD [Z30.430]    Procedure(s) (LRB):  [id number] (N/A)  PLACEMENT INTRAUTERINE DEVICE (IUD) (N/A)    Anesthesia: General    Description of the findings of the procedure: 3-5mm dark flat lesion on anterior uterine wall    Findings/Key Components: d/c, hscope mirena iud insertion...sound 10 cm    Estimated Blood Loss: * No values recorded between 4/11/2018  8:27 AM and 4/11/2018  9:04 AM *         Specimens:   Specimen (12h ago through future)    None          Discharge Note    SUMMARY     Admit Date: 4/11/2018    Discharge Date and Time:  04/11/2018 9:22 AM    Hospital Course (synopsis of major diagnoses, care, treatment, and services provided during the course of the hospital stay): tolerated surgery well..  Dr phoenix notified via phone conversation w description of findings and plan for f/u     Final Diagnosis: Post-Op Diagnosis Codes:     * Menometrorrhagia [N92.1]     * Encounter for insertion of mirena IUD [Z30.430]    Disposition: Home or Self Care    Follow Up/Patient Instructions:     Medications:  Reconciled Home Medications:      Medication List      CONTINUE taking these medications    ferrous sulfate 325 (65 FE) MG EC tablet  Take 1 tablet (325 mg total) by mouth once daily.     hydrocodone-acetaminophen 5-325mg 5-325 mg per tablet  Commonly known as:  NORCO  Take 1 tablet by mouth every 6 (six) hours as needed.     levETIRAcetam 500 MG Tab  Commonly known as:  KEPPRA  Take 1 tablet (500 mg total) by mouth 2 (two) times daily.     pantoprazole 40 MG tablet  Commonly known as:   PROTONIX  Take 1 tablet (40 mg total) by mouth once daily.            Discharge Procedure Orders  Ambulatory consult to Oncology   Referral Priority: Routine Referral Type: Consultation   Referral Reason: Specialty Services Required    Requested Specialty: Hematology and Oncology    Number of Visits Requested: 1      Diet Adult Regular     Activity as tolerated     Notify your health care provider if you experience any of the following:  temperature >100.4     Notify your health care provider if you experience any of the following:  severe uncontrolled pain       Follow-up Information     Se Davis MD In 10 days.    Specialty:  Obstetrics and Gynecology  Contact information:  [address] [place] [address] [place] [address] [phone]

## 2018-05-29 ENCOUNTER — HOSPITAL ENCOUNTER (EMERGENCY)
Facility: HOSPITAL | Age: 38
Discharge: ANOTHER HOSPITAL | End: 2018-05-29
Attending: EMERGENCY MEDICINE
Payer: MEDICAID

## 2018-05-29 VITALS
OXYGEN SATURATION: 99 % | HEART RATE: 107 BPM | DIASTOLIC BLOOD PRESSURE: 87 MMHG | SYSTOLIC BLOOD PRESSURE: 126 MMHG | WEIGHT: 155 LBS | BODY MASS INDEX: 27.9 KG/M2 | TEMPERATURE: 97 F | RESPIRATION RATE: 15 BRPM

## 2018-05-29 DIAGNOSIS — G44.89: Primary | ICD-10-CM

## 2018-05-29 DIAGNOSIS — G40.909 SEIZURE CEREBRAL: ICD-10-CM

## 2018-05-29 LAB
ALBUMIN SERPL BCP-MCNC: 3.8 G/DL
ALP SERPL-CCNC: 84 U/L
ALT SERPL W/O P-5'-P-CCNC: 16 U/L
AMPHET+METHAMPHET UR QL: NEGATIVE
ANION GAP SERPL CALC-SCNC: 7 MMOL/L
AST SERPL-CCNC: 24 U/L
BARBITURATES UR QL SCN>200 NG/ML: NEGATIVE
BASOPHILS # BLD AUTO: 0.02 K/UL
BASOPHILS NFR BLD: 0.3 %
BENZODIAZ UR QL SCN>200 NG/ML: NEGATIVE
BILIRUB SERPL-MCNC: 0.5 MG/DL
BUN SERPL-MCNC: 8 MG/DL
BZE UR QL SCN: NEGATIVE
CALCIUM SERPL-MCNC: 8.4 MG/DL
CANNABINOIDS UR QL SCN: NEGATIVE
CHLORIDE SERPL-SCNC: 103 MMOL/L
CK MB SERPL-MCNC: 1.1 NG/ML
CK MB SERPL-RTO: 3 %
CK SERPL-CCNC: 37 U/L
CK SERPL-CCNC: 37 U/L
CO2 SERPL-SCNC: 25 MMOL/L
CREAT SERPL-MCNC: 0.7 MG/DL
CREAT UR-MCNC: 78.4 MG/DL
DIFFERENTIAL METHOD: ABNORMAL
EOSINOPHIL # BLD AUTO: 0.1 K/UL
EOSINOPHIL NFR BLD: 0.8 %
ERYTHROCYTE [DISTWIDTH] IN BLOOD BY AUTOMATED COUNT: 13.9 %
EST. GFR  (AFRICAN AMERICAN): >60 ML/MIN/1.73 M^2
EST. GFR  (NON AFRICAN AMERICAN): >60 ML/MIN/1.73 M^2
GLUCOSE SERPL-MCNC: 146 MG/DL
HCT VFR BLD AUTO: 48.7 %
HGB BLD-MCNC: 15 G/DL
IMM GRANULOCYTES # BLD AUTO: 0.02 K/UL
IMM GRANULOCYTES NFR BLD AUTO: 0.3 %
LYMPHOCYTES # BLD AUTO: 0.6 K/UL
LYMPHOCYTES NFR BLD: 9.6 %
MCH RBC QN AUTO: 28.4 PG
MCHC RBC AUTO-ENTMCNC: 30.8 G/DL
MCV RBC AUTO: 92 FL
MONOCYTES # BLD AUTO: 0.2 K/UL
MONOCYTES NFR BLD: 2.7 %
NEUTROPHILS # BLD AUTO: 5.4 K/UL
NEUTROPHILS NFR BLD: 86.3 %
NRBC BLD-RTO: 0 /100 WBC
OPIATES UR QL SCN: NEGATIVE
PCP UR QL SCN>25 NG/ML: NEGATIVE
PLATELET # BLD AUTO: 132 K/UL
PMV BLD AUTO: 12.2 FL
POTASSIUM SERPL-SCNC: 3.2 MMOL/L
PROT SERPL-MCNC: 7.4 G/DL
RBC # BLD AUTO: 5.28 M/UL
SODIUM SERPL-SCNC: 135 MMOL/L
TOXICOLOGY INFORMATION: NORMAL
WBC # BLD AUTO: 6.26 K/UL

## 2018-05-29 PROCEDURE — 99285 EMERGENCY DEPT VISIT HI MDM: CPT | Mod: 25

## 2018-05-29 PROCEDURE — 80053 COMPREHEN METABOLIC PANEL: CPT

## 2018-05-29 PROCEDURE — 85025 COMPLETE CBC W/AUTO DIFF WBC: CPT

## 2018-05-29 PROCEDURE — 82553 CREATINE MB FRACTION: CPT

## 2018-05-29 PROCEDURE — 80307 DRUG TEST PRSMV CHEM ANLYZR: CPT

## 2018-05-29 PROCEDURE — 25000003 PHARM REV CODE 250: Performed by: EMERGENCY MEDICINE

## 2018-05-29 PROCEDURE — 96365 THER/PROPH/DIAG IV INF INIT: CPT

## 2018-05-29 PROCEDURE — 70450 CT HEAD/BRAIN W/O DYE: CPT | Mod: 26,,, | Performed by: RADIOLOGY

## 2018-05-29 PROCEDURE — 63600175 PHARM REV CODE 636 W HCPCS: Performed by: EMERGENCY MEDICINE

## 2018-05-29 PROCEDURE — 82550 ASSAY OF CK (CPK): CPT

## 2018-05-29 PROCEDURE — 70450 CT HEAD/BRAIN W/O DYE: CPT | Mod: TC

## 2018-05-29 PROCEDURE — 36415 COLL VENOUS BLD VENIPUNCTURE: CPT

## 2018-05-29 RX ORDER — CITALOPRAM 40 MG/1
40 TABLET, FILM COATED ORAL DAILY
COMMUNITY
End: 2018-10-10

## 2018-05-29 RX ORDER — LEVETIRACETAM 500 MG/1
500 TABLET ORAL DAILY
COMMUNITY
End: 2021-01-01 | Stop reason: HOSPADM

## 2018-05-29 RX ADMIN — DEXTROSE MONOHYDRATE 500 MG: 50 INJECTION, SOLUTION INTRAVENOUS at 09:05

## 2018-05-29 NOTE — ED TRIAGE NOTES
Pt found by family unresponsive on floor beside toilet, narcan administered by ems with + response, pt awake upon arrival to ed looking around room and attempting to sit up in bed, pt appears confused as evidence by pulling at iv's and not following simple commands, requires continuous redirection, pt covered in feces

## 2018-05-29 NOTE — ED PROVIDER NOTES
Encounter Date: 5/29/2018       History     Chief Complaint   Patient presents with    Loss of Consciousness     This is a 37-year-old white female found down at home with agonal respirations.  EMS stated that they found her wedged between the toilet and the sake with feces all over her breathing approximately 3 times a minute. Family stated that she does not take her pain medications like she is supposed to however EMS gave her Narcan and she woke up immediately.  In the ER the patient is obtunded she will respond but immediately goes back to sleep.  Patient is nonverbal currently          Review of patient's allergies indicates:   Allergen Reactions    Prozac [fluoxetine] Rash     Past Medical History:   Diagnosis Date    Anemia     Malignant melanoma metastatic to brain     Menometrorrhagia     Prolapsed uterus      Past Surgical History:   Procedure Laterality Date    crainio      CRANIOTOMY      TUBAL LIGATION       Family History   Problem Relation Age of Onset    Hypertension Mother     Heart disease Mother     Breast cancer Maternal Grandmother     Uterine cancer Maternal Grandmother      Social History   Substance Use Topics    Smoking status: Current Every Day Smoker     Packs/day: 2.00     Years: 24.00     Types: Cigarettes    Smokeless tobacco: Not on file    Alcohol use No      Comment: occasional     Review of Systems   Unable to perform ROS: Patient nonverbal   Neurological: Positive for speech difficulty.   Psychiatric/Behavioral: Positive for confusion and decreased concentration.   All other systems reviewed and are negative.      Physical Exam     Initial Vitals [05/29/18 0711]   BP Pulse Resp Temp SpO2   (!) 146/86 (!) 132 20 97.9 °F (36.6 °C) (!) 93 %      MAP       106         Physical Exam    Nursing note and vitals reviewed.  Constitutional: She appears well-developed and well-nourished. She appears lethargic.   HENT:   Head: Normocephalic and atraumatic.   Right Ear: External  ear normal.   Left Ear: External ear normal.   Nose: Nose normal.   Mouth/Throat: Oropharynx is clear and moist.   Eyes: Conjunctivae and EOM are normal. Pupils are equal, round, and reactive to light.   Neck: Normal range of motion. Neck supple. No thyromegaly present.   Cardiovascular: Normal rate, regular rhythm, normal heart sounds and intact distal pulses.   No murmur heard.  Pulmonary/Chest: Breath sounds normal. She has no wheezes.   Abdominal: Soft. Bowel sounds are normal. There is no tenderness.   Musculoskeletal: Normal range of motion.   Lymphadenopathy:     She has no cervical adenopathy.   Neurological: She has normal strength and normal reflexes. She appears lethargic. She is disoriented. She displays no atrophy and no tremor. No cranial nerve deficit. She exhibits normal muscle tone. She displays no seizure activity.   Skin: Skin is warm and dry. Capillary refill takes less than 2 seconds.   Psychiatric: Her affect is inappropriate. She is slowed. Cognition and memory are impaired. She expresses inappropriate judgment. She is noncommunicative.   Patient appears to be under the influence of intoxicating substance.  Difficult to arouse but when she does arouse she has inappropriate nonverbal. She is inattentive.         ED Course   Procedures  Labs Reviewed   CBC W/ AUTO DIFFERENTIAL   COMPREHENSIVE METABOLIC PANEL   DRUG SCREEN PANEL, URINE EMERGENCY   CK-MB   CK             Medical Decision Making:   ED Management:  Patient is more alert in the ER now she is able answer some simple questions however she still quite drowsy.  There are new lesions evident on her CT scan the cerebellar area patient is going to be transferred to [facility]note 3[facility] for further evaluation the the by her PCP Dr. Lockett.  Likely diagnosis is that she stay postictal from a seizure.                   ED Course as of May 29 0945   Tue May 29, 2018   0825 Hematocrit: (!) 48.7 [DM]   0825 Sodium: (!) 135 [DM]   0825  Potassium: (!) 3.2 [DM]   0825 Glucose: (!) 146 [DM]   0902 Toxicology Information: SEE COMMENT [DM]      ED Course User Index  [DM] Brendan Turk MD     Clinical Impression:   The primary encounter diagnosis was Postictal headache. A diagnosis of Seizure cerebral was also pertinent to this visit.                           Brendan Turk MD  05/30/18 9523

## 2018-05-29 NOTE — ED NOTES
Call placed to [facility]  for transfer, CT results faxed to be reviewed by Pt's Oncologist Dr. Lockett at [facility], awaiting call back

## 2018-05-29 NOTE — ED NOTES
Resting quietly in bed with mother at bedside Easily aroused with verbal stimuli. Remains confused.

## 2018-05-29 NOTE — ED NOTES
"Mother at bedside. Reports woke up this am with c/o "feeling hot inside and a headache". Was assisted to toilet per daughters for diarrhea and  "fell over". Mother reports has not taken medications for seizures for past 2-3 nights.   "

## 2018-10-10 ENCOUNTER — HOSPITAL ENCOUNTER (EMERGENCY)
Facility: HOSPITAL | Age: 38
Discharge: HOME | End: 2018-10-10
Attending: INTERNAL MEDICINE
Payer: MEDICAID

## 2018-10-10 VITALS
TEMPERATURE: 98 F | OXYGEN SATURATION: 97 % | DIASTOLIC BLOOD PRESSURE: 102 MMHG | SYSTOLIC BLOOD PRESSURE: 144 MMHG | HEIGHT: 63 IN | WEIGHT: 147 LBS | BODY MASS INDEX: 26.05 KG/M2 | RESPIRATION RATE: 20 BRPM | HEART RATE: 86 BPM

## 2018-10-10 DIAGNOSIS — L03.116 CELLULITIS OF LEFT KNEE: Primary | ICD-10-CM

## 2018-10-10 PROCEDURE — 10060 I&D ABSCESS SIMPLE/SINGLE: CPT | Mod: LT

## 2018-10-10 PROCEDURE — 99283 EMERGENCY DEPT VISIT LOW MDM: CPT | Mod: 25

## 2018-10-10 PROCEDURE — 25000003 PHARM REV CODE 250: Performed by: INTERNAL MEDICINE

## 2018-10-10 RX ORDER — SULFAMETHOXAZOLE AND TRIMETHOPRIM 800; 160 MG/1; MG/1
1 TABLET ORAL 2 TIMES DAILY
Qty: 14 TABLET | Refills: 0 | Status: SHIPPED | OUTPATIENT
Start: 2018-10-10 | End: 2018-10-10 | Stop reason: SDUPTHER

## 2018-10-10 RX ORDER — SULFAMETHOXAZOLE AND TRIMETHOPRIM 400; 80 MG/1; MG/1
2 TABLET ORAL
Status: COMPLETED | OUTPATIENT
Start: 2018-10-10 | End: 2018-10-10

## 2018-10-10 RX ORDER — SULFAMETHOXAZOLE AND TRIMETHOPRIM 800; 160 MG/1; MG/1
1 TABLET ORAL 2 TIMES DAILY
Qty: 14 TABLET | Refills: 0 | Status: SHIPPED | OUTPATIENT
Start: 2018-10-10 | End: 2018-10-17

## 2018-10-10 RX ADMIN — SULFAMETHOXAZOLE AND TRIMETHOPRIM 2 TABLET: 400; 80 TABLET ORAL at 11:10

## 2018-10-11 NOTE — ED PROVIDER NOTES
Encounter Date: 10/10/2018       History     Chief Complaint   Patient presents with    Knee Pain     left knee pain x 1 week after fall-redness and swellign noted to sore on knee     Patient presents with pain and swelling redness and purulence to the left knee.  She states she scraped this several weeks ago but 2 days ago it started getting more inflamed tender and draining.          Review of patient's allergies indicates:   Allergen Reactions    Prozac [fluoxetine] Rash     Past Medical History:   Diagnosis Date    Anemia     Malignant melanoma metastatic to brain     Menometrorrhagia     Prolapsed uterus      Past Surgical History:   Procedure Laterality Date    crainio      CRANIOTOMY      CRANIOTOMY WITH STEALTH-for tumor resection Right 1/5/2017    Performed by [facility] at [facility] OR 2ND FLR    [id number] N/A 4/11/2018    [facility] by [facility] at [facility] OR    PLACEMENT INTRAUTERINE DEVICE (IUD) N/A 4/11/2018    Performed by Se [facility] at [facility] OR    TUBAL LIGATION       Family History   Problem Relation Age of Onset    Hypertension Mother     Heart disease Mother     Breast cancer Maternal Grandmother     Uterine cancer Maternal Grandmother      Social History     Tobacco Use    Smoking status: Current Every Day Smoker     Packs/day: 2.00     Years: 24.00     Pack years: 48.00     Types: Cigarettes   Substance Use Topics    Alcohol use: No     Comment: occasional    Drug use: No     Review of Systems   Constitutional: Negative for fever.   HENT: Negative for sore throat.    Respiratory: Negative for shortness of breath.    Cardiovascular: Negative for chest pain.   Gastrointestinal: Negative for nausea.   Genitourinary: Negative for dysuria.   Musculoskeletal: Negative for back pain.   Skin: Positive for wound. Negative for rash.   Neurological: Negative for weakness.   Hematological: Does not bruise/bleed easily.   All other systems reviewed and  are negative.      Physical Exam     Initial Vitals [10/10/18 2225]   BP Pulse Resp Temp SpO2   (!) 144/102 86 20 98.1 °F (36.7 °C) 97 %      MAP       --         Physical Exam    Nursing note and vitals reviewed.  Constitutional: Vital signs are normal. She appears well-developed and well-nourished. She is active and cooperative.   HENT:   Head: Normocephalic and atraumatic.   Eyes: Conjunctivae, EOM and lids are normal. Pupils are equal, round, and reactive to light. Lids are everted and swept, no foreign bodies found.   Neck: Trachea normal, normal range of motion and full passive range of motion without pain. Neck supple.   Cardiovascular: Normal rate, regular rhythm, S1 normal, S2 normal, normal heart sounds, intact distal pulses and normal pulses.  No extrasystoles are present.    Pulmonary/Chest: Breath sounds normal.   Abdominal: Soft. Normal appearance and bowel sounds are normal.   Musculoskeletal: Normal range of motion.        Legs:  Pain swelling redness induration and fluctuance small abscess noted on the left knee.   Neurological: She is alert. She has normal reflexes. GCS eye subscore is 4. GCS verbal subscore is 5. GCS motor subscore is 6.   Skin: Skin is warm, dry and intact. Capillary refill takes less than 2 seconds.   Psychiatric: She has a normal mood and affect. Her speech is normal and behavior is normal. Cognition and memory are normal.         ED Course   I & D - Incision and Drainage  Date/Time: 10/11/2018 3:19 AM  Location procedure was performed: [facility] EMERGENCY DEPARTMENT  Performed by: Steven Bright MD  Authorized by: Steven Bright MD   Pre-operative diagnosis: Abscess  Post-operative diagnosis: Abscess  Consent Done: Not Needed  Type: abscess  Body area: lower extremity  Location details: left leg    Anesthesia:  Anesthetic total: 0 mL  Patient sedated: no  Scalpel size: 11  Incision type: single straight  Complexity: simple  Drainage: pus  Drainage amount: scant  Wound  treatment: incision and  drainage  Complications: No  Specimens: No  Implants: No  Patient tolerance: Patient tolerated the procedure well with no immediate complications  Comments: Small incision was made with a 11.  Blade all of the purulent material was expressed.  She had marked decompression of the abscess.        Labs Reviewed - No data to display       Imaging Results    None          Medical Decision Making:   ED Management:  Patient presents for the small abscess the left knee.  It was incised and drained.  A small to moderate amount of purulent material was removed.  She was given 2 Bactrim DS tablets and continued on those twice a day                      Clinical Impression:   The encounter diagnosis was Cellulitis of left knee.      Disposition:   Disposition: Discharged  Condition: Stable                        Steven Bright MD  10/11/18 4373

## 2019-07-13 ENCOUNTER — HOSPITAL ENCOUNTER (EMERGENCY)
Facility: HOSPITAL | Age: 39
Discharge: HOME | End: 2019-07-13
Payer: MEDICAID

## 2019-07-13 VITALS
RESPIRATION RATE: 18 BRPM | HEIGHT: 63 IN | OXYGEN SATURATION: 97 % | DIASTOLIC BLOOD PRESSURE: 78 MMHG | WEIGHT: 230 LBS | SYSTOLIC BLOOD PRESSURE: 107 MMHG | BODY MASS INDEX: 40.75 KG/M2 | HEART RATE: 108 BPM | TEMPERATURE: 99 F

## 2019-07-13 DIAGNOSIS — L02.412 ABSCESS OF LEFT AXILLA: Primary | ICD-10-CM

## 2019-07-13 PROCEDURE — 25000003 PHARM REV CODE 250: Performed by: NURSE PRACTITIONER

## 2019-07-13 PROCEDURE — 10060 I&D ABSCESS SIMPLE/SINGLE: CPT

## 2019-07-13 PROCEDURE — 87077 CULTURE AEROBIC IDENTIFY: CPT

## 2019-07-13 PROCEDURE — 99284 EMERGENCY DEPT VISIT MOD MDM: CPT | Mod: 25

## 2019-07-13 PROCEDURE — 87186 SC STD MICRODIL/AGAR DIL: CPT

## 2019-07-13 PROCEDURE — 87070 CULTURE OTHR SPECIMN AEROBIC: CPT

## 2019-07-13 RX ORDER — CLINDAMYCIN HYDROCHLORIDE 150 MG/1
300 CAPSULE ORAL 4 TIMES DAILY
Qty: 56 CAPSULE | Refills: 0 | Status: SHIPPED | OUTPATIENT
Start: 2019-07-13 | End: 2019-07-20

## 2019-07-13 RX ORDER — LIDOCAINE HYDROCHLORIDE 10 MG/ML
1 INJECTION INFILTRATION; PERINEURAL
Status: COMPLETED | OUTPATIENT
Start: 2019-07-13 | End: 2019-07-13

## 2019-07-13 RX ORDER — HYDROCODONE BITARTRATE AND ACETAMINOPHEN 5; 325 MG/1; MG/1
1 TABLET ORAL EVERY 6 HOURS PRN
Qty: 8 TABLET | Refills: 0 | Status: SHIPPED | OUTPATIENT
Start: 2019-07-13 | End: 2019-07-16

## 2019-07-13 RX ORDER — BACITRACIN ZINC 500 UNIT/G
OINTMENT (GRAM) TOPICAL 2 TIMES DAILY
Qty: 30 G | Refills: 0 | Status: SHIPPED | OUTPATIENT
Start: 2019-07-13 | End: 2021-01-01 | Stop reason: HOSPADM

## 2019-07-13 RX ADMIN — LIDOCAINE HYDROCHLORIDE 1 ML: 10 INJECTION, SOLUTION INFILTRATION; PERINEURAL at 08:07

## 2019-07-14 NOTE — ED PROVIDER NOTES
Encounter Date: 7/13/2019       History   No chief complaint on file.    More Fournier is a 39 y.o female with PMHx including metastatic brain cancer and anemia. She presents to ED with redness, swelling and pain to left axilla area since this morning    No fever, body aches or chills            Review of patient's allergies indicates:   Allergen Reactions    Prozac [fluoxetine] Rash     Past Medical History:   Diagnosis Date    Anemia     Malignant melanoma metastatic to brain     Menometrorrhagia     Prolapsed uterus      Past Surgical History:   Procedure Laterality Date    crainio      CRANIOTOMY      CRANIOTOMY WITH STEALTH-for tumor resection Right 1/5/2017    Performed by [facility] at [facility] OR 2ND FLR    [id number] N/A 4/11/2018    Performed [facility] at [facility] OR    PLACEMENT INTRAUTERINE DEVICE (IUD) N/A 4/11/2018    Performed by Se [facility] at [facility] OR    TUBAL LIGATION       Family History   Problem Relation Age of Onset    Hypertension Mother     Heart disease Mother     Breast cancer Maternal Grandmother     Uterine cancer Maternal Grandmother      Social History     Tobacco Use    Smoking status: Current Every Day Smoker     Packs/day: 2.00     Years: 24.00     Pack years: 48.00     Types: Cigarettes   Substance Use Topics    Alcohol use: No     Comment: occasional    Drug use: No     Review of Systems   Constitutional: Negative.  Negative for fever.   HENT: Negative.  Negative for sore throat.    Eyes: Negative.    Respiratory: Negative.  Negative for shortness of breath.    Cardiovascular: Negative.  Negative for chest pain.   Gastrointestinal: Negative.  Negative for nausea.   Endocrine: Negative.    Genitourinary: Negative.  Negative for dysuria.   Musculoskeletal: Negative.  Negative for back pain.   Skin: Positive for wound. Negative for rash.   Allergic/Immunologic: Negative.    Neurological: Negative.  Negative for weakness.    Hematological: Negative.  Does not bruise/bleed easily.   Psychiatric/Behavioral: Negative.    All other systems reviewed and are negative.      Physical Exam     Initial Vitals   BP Pulse Resp Temp SpO2   -- -- -- -- --      MAP       --         Physical Exam    Nursing note and vitals reviewed.  Constitutional: She appears well-developed and well-nourished.   HENT:   Head: Normocephalic.   Neck: Normal range of motion.   Cardiovascular: Normal rate.   Pulmonary/Chest: Breath sounds normal.   Musculoskeletal: Normal range of motion.   Neurological: She is alert and oriented to person, place, and time. GCS score is 15. GCS eye subscore is 4. GCS verbal subscore is 5. GCS motor subscore is 6.   Skin: Skin is warm. Capillary refill takes less than 2 seconds. Abscess noted. There is erythema.        Psychiatric: She has a normal mood and affect. Her behavior is normal. Judgment and thought content normal.         ED Course   I & D - Incision and Drainage  Date/Time: 7/13/2019 11:54 AM  Performed by: Dinora Bernal NP  Authorized by: Dinora Bernal NP   Type: abscess  Location: left axilla.    Anesthesia:  Local Anesthetic: lidocaine 1% without epinephrine  Anesthetic total (ml): 4.  Scalpel size: 10  Incision type: single straight  Complexity: simple  Drainage: serosanguinous  Drainage amount: moderate  Wound treatment: incision,  drainage,  deloculation,  expression of material and  wound packed  Packing material: 1/4 in gauze  Complications: No  Patient tolerance: Patient tolerated the procedure well with no immediate complications        Labs Reviewed - No data to display       Imaging Results    None          Medical Decision Making:   Initial Assessment:   Patient with redness, swelling and pain to left axilla area since this morning    No fever, body aches or chills      4 cm x 2 cm abscess to left axilla with moderate induration and fluctuance  Differential Diagnosis:   Cellulitis, abscess    sepsis  ED  Management:  I&D performed. Patient tolerated procedure without diff    Discussed physical exam findings with patient  No acute emergent medical condition identified at this time to warrant further testing/diagnostics  At this time, I believe the patient is clinically stable for discharge.   Patient to follow up with PCP in 1-2 days for removal of packing   The patient acknowledges that close follow up with a MD is required after all ER visits  Pt given instructions; take all medications prescribed in the ER as directed.   Patient agrees to comply with all instruction and direction given in the ER  Pt agrees to return to ER if any symptoms reoccur                               Clinical Impression:       ICD-10-CM ICD-9-CM   1. Abscess of left axilla L02.412 682.3                                Dinora Bernal NP  07/14/19 1158       Dinora Bernal NP  07/22/19 1058

## 2019-07-14 NOTE — DISCHARGE INSTRUCTIONS
Take medication as prescribed    Packing needs removed in 1-2 day    Return to ED with with worsening infection or fever as discussed    Norco for pain    Warm compresses

## 2019-07-15 ENCOUNTER — HOSPITAL ENCOUNTER (EMERGENCY)
Facility: HOSPITAL | Age: 39
Discharge: HOME | End: 2019-07-15
Attending: FAMILY MEDICINE
Payer: MEDICAID

## 2019-07-15 VITALS
HEIGHT: 63 IN | SYSTOLIC BLOOD PRESSURE: 100 MMHG | OXYGEN SATURATION: 97 % | TEMPERATURE: 98 F | DIASTOLIC BLOOD PRESSURE: 66 MMHG | BODY MASS INDEX: 39.34 KG/M2 | RESPIRATION RATE: 20 BRPM | HEART RATE: 78 BPM | WEIGHT: 222 LBS

## 2019-07-15 DIAGNOSIS — R11.0 NAUSEA: ICD-10-CM

## 2019-07-15 DIAGNOSIS — Z51.89 ABSCESS RE-CHECK: Primary | ICD-10-CM

## 2019-07-15 PROCEDURE — 99284 EMERGENCY DEPT VISIT MOD MDM: CPT

## 2019-07-15 RX ORDER — ACETAMINOPHEN AND CODEINE PHOSPHATE 300; 30 MG/1; MG/1
1 TABLET ORAL EVERY 6 HOURS PRN
Qty: 12 TABLET | Refills: 0 | Status: SHIPPED | OUTPATIENT
Start: 2019-07-15 | End: 2019-07-18

## 2019-07-15 RX ORDER — ONDANSETRON 4 MG/1
4 TABLET, FILM COATED ORAL EVERY 6 HOURS
Qty: 12 TABLET | Refills: 0 | Status: SHIPPED | OUTPATIENT
Start: 2019-07-15 | End: 2021-01-01 | Stop reason: HOSPADM

## 2019-07-15 RX ORDER — ACETAMINOPHEN AND CODEINE PHOSPHATE 300; 30 MG/1; MG/1
1 TABLET ORAL EVERY 6 HOURS PRN
Qty: 12 TABLET | Refills: 0 | Status: SHIPPED | OUTPATIENT
Start: 2019-07-15 | End: 2019-07-15 | Stop reason: SDUPTHER

## 2019-07-15 NOTE — ED PROVIDER NOTES
Encounter Date: 7/15/2019       History     Chief Complaint   Patient presents with    Wound Check     left axilla     Pt presents to the ER for wound reevaluation. Pt stated was seen firday and had an Abscess drained and packed and placed on clindamycin abx. Pt stated the abx makes her nauseated but has been able to take the medication as prescribed. The pt stated she has not had a measured fever and stated has not changed the bandage since being placed on Friday. Denied any vomiting or diarrhea.    The history is provided by the patient.     Review of patient's allergies indicates:   Allergen Reactions    Morphine Other (See Comments)     Psychotic      Tramadol Rash    Prozac [fluoxetine] Rash     Past Medical History:   Diagnosis Date    Anemia     Malignant melanoma metastatic to brain     Menometrorrhagia     Prolapsed uterus      Past Surgical History:   Procedure Laterality Date    crainio      CRANIOTOMY      CRANIOTOMY WITH STEALTH-for tumor resection Right 1/5/2017    [facility]formed [facility] at [facility] OR 2ND FLR    [id number] N/A 4/11/2018    Performed by [facility] at [facility] OR    PLACEMENT INTRAUTERINE DEVICE (IUD) N/A 4/11/2018    Performed by Se [facility] at [facility] OR    TUBAL LIGATION       Family History   Problem Relation Age of Onset    Hypertension Mother     Heart disease Mother     Breast cancer Maternal Grandmother     Uterine cancer Maternal Grandmother      Social History     Tobacco Use    Smoking status: Current Every Day Smoker     Packs/day: 2.00     Years: 24.00     Pack years: 48.00     Types: Cigarettes    Smokeless tobacco: Never Used   Substance Use Topics    Alcohol use: No     Comment: occasional    Drug use: No     Review of Systems   Constitutional: Negative for fever.   HENT: Negative for congestion and rhinorrhea.    Respiratory: Negative for shortness of breath.    Cardiovascular: Negative for chest pain.    Gastrointestinal: Positive for nausea. Negative for abdominal pain, constipation, diarrhea and vomiting.   Genitourinary: Negative for dysuria.   Musculoskeletal: Negative for back pain.   Skin: Positive for wound (Abscess to left axilla).   Neurological: Negative for dizziness and syncope.   Hematological: Does not bruise/bleed easily.   All other systems reviewed and are negative.      Physical Exam     Initial Vitals [07/15/19 1246]   BP Pulse Resp Temp SpO2   100/66 78 20 98.1 °F (36.7 °C) 97 %      MAP       --         Physical Exam    Nursing note and vitals reviewed.  Constitutional: She appears well-developed and well-nourished. She is not diaphoretic. No distress.   HENT:   Head: Atraumatic.   Eyes: Right eye exhibits no discharge. Left eye exhibits no discharge.   Neck: Normal range of motion.   Cardiovascular: Normal rate, regular rhythm and intact distal pulses.   Pulmonary/Chest: No respiratory distress.   Musculoskeletal: She exhibits tenderness (Pain to palpation of left axilla around the abscess).   Neurological: She is alert and oriented to person, place, and time. GCS score is 15. GCS eye subscore is 4. GCS verbal subscore is 5. GCS motor subscore is 6.   Skin: Skin is warm and dry. Capillary refill takes less than 2 seconds. Abscess (Incissed draining abscess to left axilla with packing in place.) noted.   Psychiatric: She has a normal mood and affect. Thought content normal.         ED Course   Procedures  Labs Reviewed - No data to display       Imaging Results    None          Medical Decision Making:   ED Management:  Abscess irrigated and dressing placed by RN with direct supervision by me.    Discussed RTER precautions with pt and pts mother stated they understood and did not have any questions. Will Rx zofran for the nausea.                      Clinical Impression:       ICD-10-CM ICD-9-CM   1. Abscess re-check Z51.89 V58.89   2. Nausea R11.0 787.02                                Amador  ESVIN Sanchez  07/15/19 1432

## 2019-07-15 NOTE — DISCHARGE INSTRUCTIONS
Wash wound twice daily with Hibiclens soap. Change bandage twice daily. If bandage becomes saturated with purulent drainage change bandage as needed. Be sure to finish all abx as prescribed do not leave any left in the bottle.    Return to ER if any worsening of symptoms.

## 2019-07-16 LAB — BACTERIA SPEC AEROBE CULT: ABNORMAL

## 2019-11-19 ENCOUNTER — OFFICE VISIT (OUTPATIENT)
Dept: FAMILY MEDICINE | Facility: CLINIC | Age: 39
End: 2019-11-19
Payer: MEDICAID

## 2019-11-19 VITALS
WEIGHT: 211.81 LBS | RESPIRATION RATE: 18 BRPM | OXYGEN SATURATION: 97 % | HEART RATE: 94 BPM | BODY MASS INDEX: 37.53 KG/M2 | HEIGHT: 63 IN | TEMPERATURE: 98 F

## 2019-11-19 DIAGNOSIS — T14.8XXA WOUND, OPEN: Primary | ICD-10-CM

## 2019-11-19 DIAGNOSIS — Z13.220 SCREENING FOR LIPID DISORDERS: ICD-10-CM

## 2019-11-19 DIAGNOSIS — Z79.52 LONG TERM SYSTEMIC STEROID USER: ICD-10-CM

## 2019-11-19 PROCEDURE — 99203 OFFICE O/P NEW LOW 30 MIN: CPT | Mod: S$GLB,,, | Performed by: FAMILY MEDICINE

## 2019-11-19 PROCEDURE — 99203 PR OFFICE/OUTPT VISIT, NEW, LEVL III, 30-44 MIN: ICD-10-PCS | Mod: S$GLB,,, | Performed by: FAMILY MEDICINE

## 2019-11-19 RX ORDER — DRONABINOL 2.5 MG/1
2.5 CAPSULE ORAL 2 TIMES DAILY
Refills: 0 | COMMUNITY
Start: 2019-10-21 | End: 2021-01-01 | Stop reason: HOSPADM

## 2019-11-19 RX ORDER — DEXAMETHASONE 2 MG/1
2 TABLET ORAL DAILY
Refills: 0 | COMMUNITY
Start: 2019-10-21 | End: 2021-01-01 | Stop reason: HOSPADM

## 2019-11-19 RX ORDER — FLUOXETINE 20 MG/1
TABLET ORAL
COMMUNITY
End: 2021-01-01 | Stop reason: HOSPADM

## 2019-11-19 RX ORDER — PANTOPRAZOLE SODIUM 40 MG/1
TABLET, DELAYED RELEASE ORAL
COMMUNITY
End: 2021-01-01 | Stop reason: HOSPADM

## 2019-11-19 RX ORDER — OXYCODONE AND ACETAMINOPHEN 10; 325 MG/1; MG/1
TABLET ORAL
Refills: 0 | COMMUNITY
Start: 2019-11-08 | End: 2021-01-01 | Stop reason: HOSPADM

## 2019-11-19 RX ORDER — BUSPIRONE HYDROCHLORIDE 5 MG/1
TABLET ORAL
Refills: 1 | COMMUNITY
Start: 2019-10-11 | End: 2021-01-01 | Stop reason: HOSPADM

## 2019-11-19 RX ORDER — LEVOFLOXACIN 500 MG/1
TABLET, FILM COATED ORAL
Refills: 0 | Status: ON HOLD | COMMUNITY
Start: 2019-08-29 | End: 2021-01-01

## 2019-11-19 RX ORDER — METOPROLOL TARTRATE 25 MG/1
25 TABLET, FILM COATED ORAL 2 TIMES DAILY
Refills: 2 | Status: ON HOLD | COMMUNITY
Start: 2019-10-15 | End: 2021-01-01

## 2019-11-19 RX ORDER — VENLAFAXINE HYDROCHLORIDE 150 MG/1
150 CAPSULE, EXTENDED RELEASE ORAL DAILY
Refills: 2 | COMMUNITY
Start: 2019-10-15 | End: 2021-01-01 | Stop reason: HOSPADM

## 2019-11-19 RX ORDER — CLONAZEPAM 0.5 MG/1
TABLET ORAL
Refills: 0 | COMMUNITY
Start: 2019-11-04 | End: 2020-05-04

## 2019-11-22 ENCOUNTER — LAB VISIT (OUTPATIENT)
Dept: LAB | Facility: HOSPITAL | Age: 39
End: 2019-11-22
Attending: FAMILY MEDICINE
Payer: MEDICAID

## 2019-11-22 DIAGNOSIS — E78.5 HYPERLIPIDEMIA, UNSPECIFIED HYPERLIPIDEMIA TYPE: ICD-10-CM

## 2019-11-22 DIAGNOSIS — Z79.52 LONG TERM SYSTEMIC STEROID USER: ICD-10-CM

## 2019-11-22 DIAGNOSIS — Z13.220 SCREENING FOR LIPID DISORDERS: ICD-10-CM

## 2019-11-22 DIAGNOSIS — E09.9 DRUG OR CHEMICAL INDUCED DIABETES MELLITUS WITHOUT COMPLICATION, WITHOUT LONG-TERM CURRENT USE OF INSULIN: Primary | ICD-10-CM

## 2019-11-22 LAB
ALBUMIN SERPL BCP-MCNC: 4.2 G/DL (ref 3.5–5.2)
ALP SERPL-CCNC: 77 U/L (ref 55–135)
ALT SERPL W/O P-5'-P-CCNC: 102 U/L (ref 10–44)
ANION GAP SERPL CALC-SCNC: 13 MMOL/L (ref 8–16)
AST SERPL-CCNC: 67 U/L (ref 10–40)
BILIRUB SERPL-MCNC: 1.3 MG/DL (ref 0.1–1)
BUN SERPL-MCNC: 15 MG/DL (ref 6–20)
CALCIUM SERPL-MCNC: 9 MG/DL (ref 8.7–10.5)
CHLORIDE SERPL-SCNC: 96 MMOL/L (ref 95–110)
CHOLEST SERPL-MCNC: 340 MG/DL (ref 120–199)
CHOLEST/HDLC SERPL: 10.6 {RATIO} (ref 2–5)
CO2 SERPL-SCNC: 28 MMOL/L (ref 23–29)
CREAT SERPL-MCNC: 0.8 MG/DL (ref 0.5–1.4)
EST. GFR  (AFRICAN AMERICAN): >60 ML/MIN/1.73 M^2
EST. GFR  (NON AFRICAN AMERICAN): >60 ML/MIN/1.73 M^2
ESTIMATED AVG GLUCOSE: 214 MG/DL (ref 68–131)
GLUCOSE SERPL-MCNC: 235 MG/DL (ref 70–110)
HBA1C MFR BLD HPLC: 9.1 % (ref 4.5–6.2)
HDLC SERPL-MCNC: 32 MG/DL (ref 40–75)
HDLC SERPL: 9.4 % (ref 20–50)
LDLC SERPL CALC-MCNC: 244.2 MG/DL (ref 63–159)
NONHDLC SERPL-MCNC: 308 MG/DL
POTASSIUM SERPL-SCNC: 3.3 MMOL/L (ref 3.5–5.1)
PROT SERPL-MCNC: 7.8 G/DL (ref 6–8.4)
SODIUM SERPL-SCNC: 137 MMOL/L (ref 136–145)
TRIGL SERPL-MCNC: 319 MG/DL (ref 30–150)

## 2019-11-22 PROCEDURE — 80053 COMPREHEN METABOLIC PANEL: CPT

## 2019-11-22 PROCEDURE — 83036 HEMOGLOBIN GLYCOSYLATED A1C: CPT

## 2019-11-22 PROCEDURE — 36415 COLL VENOUS BLD VENIPUNCTURE: CPT

## 2019-11-22 PROCEDURE — 80061 LIPID PANEL: CPT

## 2019-11-22 RX ORDER — ATORVASTATIN CALCIUM 40 MG/1
40 TABLET, FILM COATED ORAL DAILY
Qty: 90 TABLET | Refills: 3 | Status: SHIPPED | OUTPATIENT
Start: 2019-11-22 | End: 2021-01-01 | Stop reason: HOSPADM

## 2019-11-22 RX ORDER — METFORMIN HYDROCHLORIDE 500 MG/1
500 TABLET, EXTENDED RELEASE ORAL 2 TIMES DAILY WITH MEALS
Qty: 60 TABLET | Refills: 1 | Status: SHIPPED | OUTPATIENT
Start: 2019-11-22 | End: 2021-01-01 | Stop reason: HOSPADM

## 2019-11-22 NOTE — PROGRESS NOTES
"[facility] - Clinic Note    Subjective      Ms. Fournier is a 39 y.o. female who presents to clinic for sore to L side of abdomen (x4d with drainage) and Rash (abdomen)    Patient noticed a sore on the left side of her abdomen for last 4 days with some drainage as well as some surrounding erythema.  Denies any fever or chills.  Denies any pain in the abdomen.  Has been putting triple antibiotic ointment around the site.    PMH More has a past medical history of Anemia, Malignant melanoma metastatic to brain, Menometrorrhagia, and Prolapsed uterus.   PSXH More has a past surgical history that includes Tubal ligation; crainio; and Craniotomy.   FH More's family history includes Breast cancer in her maternal grandmother; Heart disease in her mother; Hypertension in her mother; Uterine cancer in her maternal grandmother.   SH More reports that she has been smoking cigarettes. She has a 48.00 pack-year smoking history. She has never used smokeless tobacco. She reports that she does not drink alcohol or use drugs.   ALG More is allergic to adhesive; morphine; tramadol; and prozac [fluoxetine].   MED More has a current medication list which includes the following prescription(s): bacitracin, buspirone, clonazepam, dexamethasone, fluoxetine, levetiracetam, levofloxacin, marinol, metoprolol tartrate, ondansetron, oxycodone-acetaminophen, pantoprazole, and venlafaxine.     Review of Systems   Constitutional: Negative for chills and fever.   Gastrointestinal: Negative for abdominal pain, nausea and vomiting.   Skin: Positive for color change and wound.     Objective     Pulse 94   Temp 98.2 °F (36.8 °C) (Oral)   Resp 18   Ht 5' 3" (1.6 m)   Wt 96.1 kg (211 lb 12.8 oz)   SpO2 97%   BMI 37.52 kg/m²     Physical Exam   Constitutional: She appears well-developed and well-nourished. No distress.   HENT:   Right Ear: External ear normal.   Left Ear: External ear normal.   Eyes: Right eye exhibits no discharge. " Left eye exhibits no discharge.   Cardiovascular: Normal rate, regular rhythm and normal heart sounds.   Pulmonary/Chest: Effort normal and breath sounds normal. She has no wheezes. She has no rales.   Neurological: She is alert.   Skin: Skin is warm and dry.        Psychiatric: She has a normal mood and affect.   Nursing note and vitals reviewed.     Assessment/Plan     1. Wound, open     2. Screening for lipid disorders  Lipid panel   3. Long term systemic steroid user  Hemoglobin A1c    Comprehensive metabolic panel     Wound is healing well.  Use wet to dry dressing to help heal properly.  No area of abscess noted. Return precautions provided.  No indication for antibiotics at this time.  Given long-term steroid use for brain tumor will obtain A1c, CMP and lipid panel.    Follow up in about 3 months (around 2/19/2020) for follow up.    Future Appointments   Date Time Provider Department Center   2/19/2020  2:00 PM Kevin Leyva MD [facility] [place] [facility]         Kevin Leyva MD  Family Medicine  [facility]

## 2019-12-05 DIAGNOSIS — E11.9 TYPE 2 DIABETES MELLITUS WITHOUT COMPLICATION: ICD-10-CM

## 2020-01-01 ENCOUNTER — TELEPHONE (OUTPATIENT)
Dept: ORTHOPEDICS | Facility: CLINIC | Age: 40
End: 2020-01-01

## 2020-01-01 ENCOUNTER — HOSPITAL ENCOUNTER (OUTPATIENT)
Dept: RADIOLOGY | Facility: HOSPITAL | Age: 40
Discharge: HOME | End: 2020-11-18
Attending: ORTHOPAEDIC SURGERY
Payer: MEDICAID

## 2020-01-01 ENCOUNTER — HOSPITAL ENCOUNTER (EMERGENCY)
Facility: HOSPITAL | Age: 40
Discharge: HOME | End: 2020-10-27
Payer: MEDICAID

## 2020-01-01 ENCOUNTER — OFFICE VISIT (OUTPATIENT)
Dept: ORTHOPEDICS | Facility: CLINIC | Age: 40
End: 2020-01-01
Payer: MEDICAID

## 2020-01-01 ENCOUNTER — HOSPITAL ENCOUNTER (OUTPATIENT)
Dept: RADIOLOGY | Facility: HOSPITAL | Age: 40
Discharge: HOME | End: 2020-11-06
Attending: ORTHOPAEDIC SURGERY
Payer: MEDICAID

## 2020-01-01 VITALS
DIASTOLIC BLOOD PRESSURE: 84 MMHG | RESPIRATION RATE: 20 BRPM | HEART RATE: 84 BPM | WEIGHT: 190 LBS | TEMPERATURE: 98 F | OXYGEN SATURATION: 99 % | HEIGHT: 63 IN | SYSTOLIC BLOOD PRESSURE: 115 MMHG | BODY MASS INDEX: 33.66 KG/M2

## 2020-01-01 VITALS
HEART RATE: 69 BPM | BODY MASS INDEX: 33.66 KG/M2 | HEIGHT: 63 IN | WEIGHT: 190 LBS | RESPIRATION RATE: 18 BRPM | OXYGEN SATURATION: 97 % | TEMPERATURE: 99 F

## 2020-01-01 DIAGNOSIS — S92.255A CLOSED NONDISPLACED FRACTURE OF NAVICULAR BONE OF LEFT FOOT, INITIAL ENCOUNTER: ICD-10-CM
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
## 2020-01-01 DIAGNOSIS — L03.119 CELLULITIS OF LOWER EXTREMITY, UNSPECIFIED LATERALITY: ICD-10-CM

## 2020-01-01 DIAGNOSIS — S92.255A CLOSED NONDISPLACED FRACTURE OF NAVICULAR BONE OF LEFT FOOT, INITIAL ENCOUNTER: Primary | ICD-10-CM

## 2020-01-01 DIAGNOSIS — S93.622A LISFRANC'S SPRAIN, LEFT, INITIAL ENCOUNTER: Primary | ICD-10-CM

## 2020-01-01 DIAGNOSIS — M79.672 LEFT FOOT PAIN: ICD-10-CM

## 2020-01-01 DIAGNOSIS — D69.6 THROMBOCYTOPENIA: ICD-10-CM

## 2020-01-01 DIAGNOSIS — M79.672 LEFT FOOT PAIN: Primary | ICD-10-CM

## 2020-01-01 DIAGNOSIS — R60.0 EDEMA OF BOTH LEGS: ICD-10-CM

## 2020-01-01 DIAGNOSIS — E87.6 HYPOKALEMIA: ICD-10-CM

## 2020-01-01 LAB
ALBUMIN SERPL BCP-MCNC: 4 G/DL (ref 3.5–5.2)
ALP SERPL-CCNC: 81 U/L (ref 55–135)
ALT SERPL W/O P-5'-P-CCNC: 53 U/L (ref 10–44)
ANION GAP SERPL CALC-SCNC: 12 MMOL/L (ref 8–16)
APTT BLDCRRT: 22.4 SEC (ref 21–32)
AST SERPL-CCNC: 38 U/L (ref 10–40)
BASOPHILS # BLD AUTO: 0.02 K/UL (ref 0–0.2)
BASOPHILS NFR BLD: 0.3 % (ref 0–1.9)
BILIRUB SERPL-MCNC: 1 MG/DL (ref 0.1–1)
BILIRUB UR QL STRIP: ABNORMAL
BNP SERPL-MCNC: 56 PG/ML (ref 0–99)
BUN SERPL-MCNC: 10 MG/DL (ref 6–20)
CALCIUM SERPL-MCNC: 9.2 MG/DL (ref 8.7–10.5)
CHLORIDE SERPL-SCNC: 96 MMOL/L (ref 95–110)
CLARITY UR: CLEAR
CO2 SERPL-SCNC: 31 MMOL/L (ref 23–29)
COLOR UR: YELLOW
CREAT SERPL-MCNC: 1 MG/DL (ref 0.5–1.4)
DIFFERENTIAL METHOD: ABNORMAL
EOSINOPHIL # BLD AUTO: 0 K/UL (ref 0–0.5)
EOSINOPHIL NFR BLD: 0.5 % (ref 0–8)
ERYTHROCYTE [DISTWIDTH] IN BLOOD BY AUTOMATED COUNT: 14.8 % (ref 11.5–14.5)
EST. GFR  (AFRICAN AMERICAN): >60 ML/MIN/1.73 M^2
EST. GFR  (NON AFRICAN AMERICAN): >60 ML/MIN/1.73 M^2
GLUCOSE SERPL-MCNC: 103 MG/DL (ref 70–110)
GLUCOSE UR QL STRIP: NEGATIVE
HCT VFR BLD AUTO: 46 % (ref 37–48.5)
HGB BLD-MCNC: 14.5 G/DL (ref 12–16)
HGB UR QL STRIP: NEGATIVE
IMM GRANULOCYTES # BLD AUTO: 0.03 K/UL (ref 0–0.04)
IMM GRANULOCYTES NFR BLD AUTO: 0.5 % (ref 0–0.5)
INR PPP: 1.1 (ref 0.8–1.2)
KETONES UR QL STRIP: ABNORMAL
LEUKOCYTE ESTERASE UR QL STRIP: NEGATIVE
LYMPHOCYTES # BLD AUTO: 1.2 K/UL (ref 1–4.8)
LYMPHOCYTES NFR BLD: 18.7 % (ref 18–48)
MCH RBC QN AUTO: 30.5 PG (ref 27–31)
MCHC RBC AUTO-ENTMCNC: 31.5 G/DL (ref 32–36)
MCV RBC AUTO: 97 FL (ref 82–98)
MONOCYTES # BLD AUTO: 0.3 K/UL (ref 0.3–1)
MONOCYTES NFR BLD: 5.4 % (ref 4–15)
NEUTROPHILS # BLD AUTO: 4.7 K/UL (ref 1.8–7.7)
NEUTROPHILS NFR BLD: 74.6 % (ref 38–73)
NITRITE UR QL STRIP: NEGATIVE
NRBC BLD-RTO: 0 /100 WBC
PH UR STRIP: 6 [PH] (ref 5–8)
PLATELET # BLD AUTO: 99 K/UL (ref 150–350)
PMV BLD AUTO: 11.9 FL (ref 9.2–12.9)
POTASSIUM SERPL-SCNC: 3 MMOL/L (ref 3.5–5.1)
PROT SERPL-MCNC: 7.2 G/DL (ref 6–8.4)
PROT UR QL STRIP: NEGATIVE
PROTHROMBIN TIME: 11 SEC (ref 9–12.5)
RBC # BLD AUTO: 4.76 M/UL (ref 4–5.4)
SODIUM SERPL-SCNC: 139 MMOL/L (ref 136–145)
SP GR UR STRIP: 1.02 (ref 1–1.03)
URN SPEC COLLECT METH UR: ABNORMAL
UROBILINOGEN UR STRIP-ACNC: 1 EU/DL
WBC # BLD AUTO: 6.25 K/UL (ref 3.9–12.7)

## 2020-01-01 PROCEDURE — 99999 PR PBB SHADOW E&M-EST. PATIENT-LVL III: CPT | Mod: PBBFAC,,, | Performed by: ORTHOPAEDIC SURGERY

## 2020-01-01 PROCEDURE — 85610 PROTHROMBIN TIME: CPT

## 2020-01-01 PROCEDURE — 83880 ASSAY OF NATRIURETIC PEPTIDE: CPT

## 2020-01-01 PROCEDURE — 73718 MRI LOWER EXTREMITY W/O DYE: CPT | Mod: TC,LT

## 2020-01-01 PROCEDURE — 99284 EMERGENCY DEPT VISIT MOD MDM: CPT | Mod: 25

## 2020-01-01 PROCEDURE — 99203 PR OFFICE/OUTPT VISIT, NEW, LEVL III, 30-44 MIN: ICD-10-PCS | Mod: S$PBB,,, | Performed by: ORTHOPAEDIC SURGERY

## 2020-01-01 PROCEDURE — 99203 OFFICE O/P NEW LOW 30 MIN: CPT | Mod: S$PBB,,, | Performed by: ORTHOPAEDIC SURGERY

## 2020-01-01 PROCEDURE — 99213 OFFICE O/P EST LOW 20 MIN: CPT | Mod: PBBFAC,25 | Performed by: ORTHOPAEDIC SURGERY

## 2020-01-01 PROCEDURE — 73630 X-RAY EXAM OF FOOT: CPT | Mod: TC,FY,LT

## 2020-01-01 PROCEDURE — 25000003 PHARM REV CODE 250: Performed by: PHYSICIAN ASSISTANT

## 2020-01-01 PROCEDURE — 73718 MRI FOOT (FOREFOOT) LEFT WITHOUT CONTRAST: ICD-10-PCS | Mod: 26,LT,, | Performed by: RADIOLOGY

## 2020-01-01 PROCEDURE — 85025 COMPLETE CBC W/AUTO DIFF WBC: CPT

## 2020-01-01 PROCEDURE — 73630 XR FOOT COMPLETE 3 VIEW LEFT: ICD-10-PCS | Mod: 26,LT,, | Performed by: RADIOLOGY

## 2020-01-01 PROCEDURE — 73630 X-RAY EXAM OF FOOT: CPT | Mod: 26,LT,, | Performed by: RADIOLOGY

## 2020-01-01 PROCEDURE — 73718 MRI LOWER EXTREMITY W/O DYE: CPT | Mod: 26,LT,, | Performed by: RADIOLOGY

## 2020-01-01 PROCEDURE — 80053 COMPREHEN METABOLIC PANEL: CPT

## 2020-01-01 PROCEDURE — 85730 THROMBOPLASTIN TIME PARTIAL: CPT

## 2020-01-01 PROCEDURE — 99999 PR PBB SHADOW E&M-EST. PATIENT-LVL III: ICD-10-PCS | Mod: PBBFAC,,, | Performed by: ORTHOPAEDIC SURGERY

## 2020-01-01 PROCEDURE — 81003 URINALYSIS AUTO W/O SCOPE: CPT

## 2020-01-01 RX ORDER — CLINDAMYCIN HYDROCHLORIDE 300 MG/1
300 CAPSULE ORAL EVERY 6 HOURS
Qty: 28 CAPSULE | Refills: 0 | Status: SHIPPED | OUTPATIENT
Start: 2020-01-01 | End: 2020-01-01

## 2020-01-01 RX ORDER — POTASSIUM CHLORIDE 20 MEQ/1
40 TABLET, EXTENDED RELEASE ORAL ONCE
Status: COMPLETED | OUTPATIENT
Start: 2020-01-01 | End: 2020-01-01

## 2020-01-01 RX ADMIN — POTASSIUM CHLORIDE 40 MEQ: 1500 TABLET, EXTENDED RELEASE ORAL at 02:10

## 2020-03-05 DIAGNOSIS — E11.9 TYPE 2 DIABETES MELLITUS WITHOUT COMPLICATION: ICD-10-CM

## 2020-05-04 ENCOUNTER — NURSE TRIAGE (OUTPATIENT)
Dept: ADMINISTRATIVE | Facility: CLINIC | Age: 40
End: 2020-05-04

## 2020-05-04 RX ORDER — DOXYCYCLINE HYCLATE 100 MG
100 TABLET ORAL 2 TIMES DAILY
COMMUNITY
Start: 2020-04-30 | End: 2020-05-10

## 2020-05-04 NOTE — TELEPHONE ENCOUNTER
Patient's mother states she wants to wait and contact More's cancer doctor, DR. Lockett in the morning. Explained to patient's mom that if she has not eaten in 2 days she really needs to call 911 and get them to transport her to the hospital. Mom states she will just check her temp and blood sugar during the night.

## 2020-05-04 NOTE — TELEPHONE ENCOUNTER
"98.5 oral temp /71 HR 95    Reason for Disposition   [1] Neutropenia known or suspected (e.g., recent cancer chemotherapy) AND [2] vomiting    Additional Information   Negative: Shock suspected (e.g., cold/pale/clammy skin, too weak to stand, low BP, rapid pulse)   Negative: Difficult to awaken or acting confused (e.g., disoriented, slurred speech)   Negative: Sounds like a life-threatening emergency to the triager   Negative: [1] Vomiting AND [2] contains red blood or black ("coffee ground") material  (Exception: few red streaks in vomit that only happened once)   Negative: [1] Vomiting AND [2] recent head injury (within last 3 days)   Negative: [1] Vomiting AND [2] recent abdominal injury (within last 3 days)   Negative: [1] Vomiting AND [2] insulin-dependent diabetes (Type I) AND [3] glucose > 400 mg/dl (22 mmol/l)   Negative: [1] Neutropenia known or suspected (e.g., recent cancer chemotherapy) AND [2] fever > 100.4 F (38.0 C)    Protocols used: CANCER - NAUSEA AND VOMITING-A-AH    tafinlar 150 mg (2x75 mg pills) PO BID  , mekinist 2mg PO daily   "

## 2020-05-05 ENCOUNTER — PATIENT MESSAGE (OUTPATIENT)
Dept: ADMINISTRATIVE | Facility: HOSPITAL | Age: 40
End: 2020-05-05

## 2020-05-20 ENCOUNTER — PATIENT MESSAGE (OUTPATIENT)
Dept: ADMINISTRATIVE | Facility: HOSPITAL | Age: 40
End: 2020-05-20

## 2020-06-18 DIAGNOSIS — Z12.39 BREAST CANCER SCREENING: ICD-10-CM

## 2020-10-05 ENCOUNTER — PATIENT MESSAGE (OUTPATIENT)
Dept: ADMINISTRATIVE | Facility: HOSPITAL | Age: 40
End: 2020-10-05

## 2020-10-27 NOTE — ED PROVIDER NOTES
Please note that my documentation in this Electronic Healthcare Record was produced using speech recognition software and therefore may contain errors related to that software.These could include grammar, punctuation and spelling errors or the inclusion/ exclusion of phrases that were not intended. Please contact myself for any clarification, questions or concerns.    HPI: Patient is a 40 y.o. female who presents with the chief complaint of left foot pain x 7 days. Pt states she had a fall at home and injured her left foot. Came today because she could no longer walk on it. 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 Has bruises on skin but does not know how they got there. Unsure if she takes a blood thinner. Denies numbness or tingling.  I did speak with the mother who confirms stage for malignant melanoma to the brain.  Recently had a PET scan and there has been no changes.  Patient is pretty much bedridden and does have a home health nurse once a week.  The mom states she has been taking potassium, venlafaxine, BuSpar, full can as well, zolpidem, Keppra, pantoprazole, metoprolol, Lasix, and dexamethasone.  Mom states that she recently began taking the potassium again and has not been taking the Lasix for about 3 weeks.  Mom states the redness of the legs began today.    REVIEW OF SYSTEMS - 10 systems were independently reviewed and are otherwise negative with the exception of those items previously documented in the HPI and nursing notes.    Allergy: Adhesive, Morphine, Tramadol, and Prozac [fluoxetine]    Past medical history:   Past Medical History:   Diagnosis Date    Anemia     Malignant melanoma metastatic to brain     Menometrorrhagia     Prolapsed uterus     Seizures        Surgical History:   Past Surgical History:   Procedure Laterality Date    crainio      CRANIOTOMY      TUBAL LIGATION         Social history:   Social History     Socioeconomic History    Marital status:       "Spouse name: Not on file    Number of children: Not on file    Years of education: Not on file    Highest education level: Not on file   Occupational History    Not on file   Social Needs    Financial resource strain: Not on file    Food insecurity     Worry: Not on file     Inability: Not on file    Transportation needs     Medical: Not on file     Non-medical: Not on file   Tobacco Use    Smoking status: Current Every Day Smoker     Packs/day: 2.00     Years: 24.00     Pack years: 48.00     Types: Cigarettes    Smokeless tobacco: Never Used   Substance and Sexual Activity    Alcohol use: No     Comment: occasional    Drug use: No    Sexual activity: Not Currently   Lifestyle    Physical activity     Days per week: Not on file     Minutes per session: Not on file    Stress: Not on file   Relationships    Social connections     Talks on phone: Not on file     Gets together: Not on file     Attends Latter-day service: Not on file     Active member of club or organization: Not on file     Attends meetings of clubs or organizations: Not on file     Relationship status: Not on file   Other Topics Concern    Not on file   Social History Narrative    Not on file       Family history: non-contributory    EHR: reviewed    Vitals: BP (!) 141/98   Pulse 90   Temp 97.6 °F (36.4 °C) (Oral)   Resp 20   Ht 5' 3" (1.6 m)   Wt 86.2 kg (190 lb)   SpO2 99%   Breastfeeding No   BMI 33.66 kg/m²     PHYSICAL EXAM:    General-39 yo female awake and alert, oriented, GCS 15, in no acute distress,  HEENT- normocephalic, atraumatic, sclera anicteric, moist mucous membranes, PERRL, EOMI  CARDIOVASCULAR- regular rate and rhythm  PULMONARY- nonlabored, no respiratory distress  NEUROLOGIC- mental status normal, speech fluid, cognition normal, CN II-XII grossly intact, sensations equal normal bilateral upper and lower extremities, peripheral pulse 2 +/4, ambulatory with proper gait.  MUSCULOSKELETAL- well-nourished, " well-developed  DERMATOLOGIC- warm and dry, no visible rashes, multiple bruises noted on the upper and lower extremities.  no obvious active bleeding.  Bilateral extremity 2+ pitting edema with some weeping.  Cellulitis bilaterally from the foot with extension to the bilateral knees swelling, bruising, tenderness over dorsum of the foot near the distal lateral portion.  Does have some tenderness over the navicular bone.  PSYCHIATRIC- normal affect, normal concentration           Labs Reviewed   CBC W/ AUTO DIFFERENTIAL - Abnormal; Notable for the following components:       Result Value    MCHC 31.5 (*)     RDW 14.8 (*)     Platelets 99 (*)     Gran % 74.6 (*)     All other components within normal limits   COMPREHENSIVE METABOLIC PANEL - Abnormal; Notable for the following components:    Potassium 3.0 (*)     CO2 31 (*)     ALT 53 (*)     All other components within normal limits   APTT   PROTIME-INR   B-TYPE NATRIURETIC PEPTIDE   URINALYSIS, REFLEX TO URINE CULTURE       X-Ray Foot Complete Left   Final Result      1. Partial visualization of a subtle oblique lucency involving the navicular bone.  This may represent a partially fused os naviculare.  Differential diagnosis includes a nondisplaced fracture.  Correlate clinically with focal tenderness at this level.   2. Soft tissue swelling of the forefoot.         Electronically signed by: Howard Chandler   Date:    10/27/2020   Time:    13:18          MEDICAL DECISION MAKING: Patient is a 40 y.o. female who presented with chief complaint of left foot pain after trauma about a week ago.  On examination, it is noted she has some cellulitis on bilateral lower extremities as well as some edema, bruising on the extremities.  Does have metastatic cancer and not currently on any chemotherapy or radiation.  Patient denies any fever or chills.  X-ray does show a subtle oblique lucency involving the navicular bone as well some soft tissue swelling of the forefoot.  Ace wrap  was applied and patient placed in a postop shoe.  Given referral to Orthopedic.  Her potassium is low 3.0 and was given potassium supplements today.  CBC does show some thrombocytopenia but no obvious signs of anemia or pancytopenia.  For patient does not have any obvious active bleeding at this time.  Her BNP and coags are within normal range.  We did discuss with the patient and her aunt about keeping her here in the hospital for fluid overload, hyperkalemia, thrombocytopenia the patient would like to be sent home.  States she has a gurney bed at home and has a home health nurse that comes weekly.  Patient states she does have a medications at home and will take them as previously prescribed.  Given the cellulitis, she was sent home with clindamycin.  Advised to follow up with primary and orthopedic in the next couple of days.  Patient was evaluated by myself Dr. Mcgee.  She has good decision-making capabilities and advised return if she develops any worsening symptoms.    CLINICAL IMPRESSION:  1. Closed nondisplaced fracture of navicular bone of left foot, initial encounter    2. Fall    3. Hypokalemia    4. Thrombocytopenia    5. Edema of both legs    6. Cellulitis of lower extremity, unspecified laterality         ESVIN Rosenberg  10/27/20 [id number]

## 2020-10-27 NOTE — DISCHARGE INSTRUCTIONS
Take the medications as prescribed.  Continue taking home medications as previously prescribed, including the Lasix and potassium.  Return for any worsening or new symptoms. Follow up with Primary Care Provider in the next 2-3 days. Also follow up with ortho in regards to broken left foot. Keep off the left foot until evaluated by ortho.

## 2020-11-06 NOTE — TELEPHONE ENCOUNTER
----- Message from Rufina Valderrama MA sent at 11/6/2020  4:20 PM CST -----  Requesting a call back in regard to what # the air pressure for the boot needs to be on. In the process of changing it .   Call Back # [phone]

## 2020-11-06 NOTE — LETTER
November 9, 2020      ESVIN Rosenberg  [address] [place] [address]           [facility]  [address] [place] [address]  Phone: [phone]  Fax: [phone]          Patient: More Fournier   MR Number: [id number]   YOB: 1980   Date of Visit: 11/6/2020       Dear Megan Estrella:    Thank you for referring More Fournier to me for evaluation. Attached you will find relevant portions of my assessment and plan of care.    If you have questions, please do not hesitate to call me. I look forward to following More Fournier along with you.    Sincerely,    Ariel Welsh, DO    Enclosure  CC:  No Recipients    If you would like to receive this communication electronically, please contact [email or web address] or [phone] to request more information on [employer] access.    For providers and/or their staff who would like to refer a patient to [facility], please contact us through our one-stop-shop provider referral line, [facility], at [phone].    If you feel you have received this communication in error or would no longer like to receive these types of communications, please e-mail [email or web address]

## 2020-11-09 NOTE — PROGRESS NOTES
Subjective:      Patient ID: More Fournier is a 40 y.o. female.    Chief Complaint: Injury of the Left Foot    Referring Provider: Tim Rosenberg  [address] [place] [address],  [place] [id number]    HPI:   is a 40-year-old lady who presented today for evaluation of 1 week of left foot pain which began after she was trying to avoid stepping on oxygen hose and her foot caught causing her to fall to the ground sitting on it.  Her dated injury 10/20/2020.  Walking and going up and down stairs increases her symptoms while elevation decreases them.  She denied numbness or tingling in her foot.  She stated that after her injury she had extensive swelling in her foot.    Past Medical History:   Diagnosis Date    Anemia     Malignant melanoma metastatic to brain     Menometrorrhagia     Prolapsed uterus      *  *  *  *  *  *  * Seizures  Hypertension  Depression  Anxiety  Headaches  MI  Stroke  GERD      Past Surgical History:   Procedure Laterality Date    crainio      CRANIOTOMY      TUBAL LIGATION         Review of patient's allergies indicates:   Allergen Reactions    Adhesive Rash    Morphine Other (See Comments)     Psychotic      Tramadol Rash    Prozac [fluoxetine] Rash       Social History     Occupational History    Disabled homemaker   Tobacco Use    Smoking status: Current Every Day Smoker     Packs/day: 2.00     Years: 24.00     Pack years: 48.00     Types: Cigarettes    Smokeless tobacco: Never Used   Substance and Sexual Activity    Alcohol use: No     Comment: occasional    Drug use: No    Sexual activity: Not Currently      Family History   Problem Relation Age of Onset    Hypertension Mother     Heart disease Mother     Breast cancer Maternal Grandmother     Uterine cancer Maternal Grandmother        Previous Hospitalizations:  Childbirth, mi.      ROS:   Review of Systems   Constitution: Negative for chills and fever.   HENT: Negative for congestion.    Eyes: Negative for  blurred vision.   Cardiovascular: Negative for chest pain.   Respiratory: Negative for cough.    Endocrine: Negative for polydipsia.   Hematologic/Lymphatic: Negative for adenopathy.   Skin: Negative for flushing and itching.   Musculoskeletal: Positive for joint pain and joint swelling. Negative for gout.   Gastrointestinal: Positive for heartburn. Negative for constipation and diarrhea.   Genitourinary: Negative for nocturia.   Neurological: Positive for headaches and seizures.   Psychiatric/Behavioral: Positive for depression. Negative for substance abuse. The patient is nervous/anxious.    Allergic/Immunologic: Negative for environmental allergies.           Objective:      Physical Exam:   General: AAOx3.  No acute distress  HEENT: Normocephalic, PEARLA EOMI, Good Dentition  Neck: Supple, No JVD  Chest: Symetric, equal excursion on inspiration  Abdomen: Soft NTND  Vascular:  Pulses intact and equal bilaterally.  Capillary refill less than 3 seconds and equal bilaterally  Neurologic:  Pinprick and soft touch intact and equal bilaterally  Integment:  Forefoot ecchymosis along with plantar ecchymosis.  Ecchymosis extends into the toes.  Extremity:  Ankle/foot:  Dorsiflexion/plantar flexion near equal bilaterally 20/30 degrees.  Nontender at the ATFL bilaterally.  Nontender at the deltoid ligament bilaterally.  Drawer equal bilaterally with endpoint.  Nontender at the Achilles tendon insertion on the calcaneus.  Achilles palpable throughout full distribution bilaterally.  Nontender at the plantar fascia insertion on the calcaneus.  Tender at the Lisfranc interval.  Swelling of the left forefoot.  Squeeze test positive left foot.  Minimal tenderness at the navicular tubercle left foot.  Windlass positive left foot  Radiography:  Personally reviewed x-rays of the left foot completed on 11/06/2020 showed subtle widening of the Lisfranc interval a nondisplaced tarsal navicular tubercle fracture with no change in  position when compared to 10/27/2020.      Assessment:       Impression:      1. Lisfranc's sprain, left, initial encounter    2. Closed nondisplaced fracture of navicular bone of left foot, initial encounter    3. Left foot pain          Plan:       1.  Discussed physical examination and radiographic findings with the patient. More understands that she has what appears to be a Lisfranc injury to her left foot.  Along with a navicular tubercle fracture.  Discussed with the patient that in order to fully evaluate her foot an MRI is warranted.  2.  CAM walker, left foot, 1 was fitted to the patient.  3.  Refer for an MRI of the left foot.  4.  Final recommendations after MRI is completed.  5.  Ambulate with a walker crutches nonweightbearing to the left foot.  6.  Elevate left foot it was discussed with the patient what is meant by elevation.  7.  Offered pain meds the patient declined.  8.  Follow up after MRI is completed.

## 2020-11-12 NOTE — TELEPHONE ENCOUNTER
----- Message from Howard Moses sent at 11/12/2020  2:03 PM CST -----  Regarding: gilbert Santizo  [phone]  Type: Needs Medical Advice    Who Called:  gilbert Santizo  [phone]    Additional Information:   Advised very upset.    Advised needs to speak with the nurse as soon as possible about an MRI scheduled for tomorrow that was denied by insurance.  Please call as soon as possible..

## 2020-11-12 NOTE — TELEPHONE ENCOUNTER
Returned call to pt mother. Informed mother that Dr. Welsh has to do a peer to peer. Once approved we will get it rescheduled. Verbalized understanding.

## 2020-11-17 NOTE — TELEPHONE ENCOUNTER
Peer to peer completed. Physician is sending approval request to Medicaid to get the approval.     Awaiting approval from Medicaid

## 2020-11-18 NOTE — TELEPHONE ENCOUNTER
----- Message from Nancy Ponce sent at 11/18/2020  4:08 PM CST -----  Contact: Pt's Mother  Sooner Appt Request  Caller is requesting a sooner appt. Caller declined first available listed below. Caller will not accept being placed on the wait list and is requesting a message be sent to the doctor.     Name of Caller: Mother  When is the first available appointment?: 12/4  Symptoms:   Best Call Back: [phone]    Additional Info: Pt's mother states she got a call from Dr. Mclean's office stating provider would not be in the office at the time of pt's appointment on 11/20. Pt had appointment scheduled for 1:15. Nurse told pt to reschedule for 9:15 but there is no availability until 12/4. Please call back and advise. Thank you

## 2020-11-19 NOTE — TELEPHONE ENCOUNTER
Returned call to patient's mother. Mother stated their ride for tomorrow is the hospital as she had a stroke. Would like to discuss results via telephone. Informed mother Dr. Welsh does not do virtual appointments. Mother stated she will call to reschedule appointment. Verbalized understanding.

## 2020-11-19 NOTE — TELEPHONE ENCOUNTER
----- Message from Nancy Ponce sent at 11/19/2020  8:33 AM CST -----  Contact: Mother  Needs Medical Advice  Who Called: Pt's mother  Symptoms:   How Long Has the Patient had these Symptoms:   Pharmacy Name and Phone #:   Best Contact #: [phone]    Additional Information: Pt's mother calling requesting a call back from Cely regarding appointment on Friday. Please call back and advise

## 2020-11-30 NOTE — TELEPHONE ENCOUNTER
Returned call to patient's mother. Advised mother patient should keep her cam walker on. Mother verbalized understanding.

## 2020-11-30 NOTE — TELEPHONE ENCOUNTER
----- Message from Kishore Dutton sent at 11/30/2020  1:41 PM CST -----  Regarding: pt mother Darlyn  Type: Needs Medical Advice    Who Called:  Mother    Best Call Back Number: [phone]   Additional Information: pt cant get back to see dr mcnair b/c of transportation issues. Pt keeps falling with cast on, last time was 3:30am this moring. Mom wants to know if can take cast of yet. Please call to discuss.

## 2020-12-03 NOTE — TELEPHONE ENCOUNTER
----- Message from Maria Isabel Adan MA sent at 12/3/2020  4:20 PM CST -----  Contact: mother  Missed your call   Call back

## 2020-12-03 NOTE — TELEPHONE ENCOUNTER
----- Message from Janet Cortez sent at 12/3/2020  3:34 PM CST -----  Contact: Darlyn/Mother [phone]  Requesting to speak with you regarding a future appt.    Please call and advise.    Thank You

## 2020-12-03 NOTE — TELEPHONE ENCOUNTER
----- Message from Tete Lagos sent at 12/3/2020  1:52 PM CST -----  Regarding: Advice  Contact: pt mom Darlyn  Reason:Calling to speak with Cely pertaining to daughter no other info provided    Communication:[phone]

## 2020-12-03 NOTE — TELEPHONE ENCOUNTER
Returned call to patient's mother, Darlyn. Informed Darlyn they will have to come in for results. Darlyn stated they did not have a ride. Advised Darlyn to contact [place] Medicaid Transport to arrange ride for appointment. Verbalized understanding.

## 2021-01-01 ENCOUNTER — HOSPITAL ENCOUNTER (EMERGENCY)
Facility: HOSPITAL | Age: 41
Discharge: HOME | End: 2021-09-28
Attending: FAMILY MEDICINE
Payer: MEDICAID

## 2021-01-01 ENCOUNTER — HOSPITAL ENCOUNTER (INPATIENT)
Facility: HOSPITAL | Age: 41
LOS: 6 days | End: 2021-10-09
Attending: PSYCHIATRY & NEUROLOGY | Admitting: PSYCHIATRY & NEUROLOGY
Payer: MEDICAID

## 2021-01-01 ENCOUNTER — PATIENT MESSAGE (OUTPATIENT)
Dept: ADMINISTRATIVE | Facility: HOSPITAL | Age: 41
End: 2021-01-01

## 2021-01-01 ENCOUNTER — NURSE TRIAGE (OUTPATIENT)
Dept: ADMINISTRATIVE | Facility: CLINIC | Age: 41
End: 2021-01-01

## 2021-01-01 ENCOUNTER — HOSPITAL ENCOUNTER (EMERGENCY)
Facility: HOSPITAL | Age: 41
Discharge: ANOTHER HOSPITAL | End: 2021-10-03
Attending: INTERNAL MEDICINE
Payer: MEDICAID

## 2021-01-01 VITALS
RESPIRATION RATE: 22 BRPM | BODY MASS INDEX: 30.83 KG/M2 | OXYGEN SATURATION: 18 % | DIASTOLIC BLOOD PRESSURE: 46 MMHG | WEIGHT: 174 LBS | HEART RATE: 130 BPM | TEMPERATURE: 101 F | SYSTOLIC BLOOD PRESSURE: 65 MMHG | HEIGHT: 63 IN

## 2021-01-01 VITALS
HEIGHT: 63 IN | BODY MASS INDEX: 38.27 KG/M2 | SYSTOLIC BLOOD PRESSURE: 87 MMHG | RESPIRATION RATE: 21 BRPM | TEMPERATURE: 98 F | HEART RATE: 115 BPM | OXYGEN SATURATION: 94 % | WEIGHT: 216 LBS | DIASTOLIC BLOOD PRESSURE: 72 MMHG

## 2021-01-01 VITALS
SYSTOLIC BLOOD PRESSURE: 154 MMHG | BODY MASS INDEX: 27.46 KG/M2 | OXYGEN SATURATION: 91 % | TEMPERATURE: 99 F | WEIGHT: 155 LBS | HEART RATE: 80 BPM | RESPIRATION RATE: 16 BRPM | HEIGHT: 63 IN | DIASTOLIC BLOOD PRESSURE: 93 MMHG

## 2021-01-01 DIAGNOSIS — D69.6 THROMBOCYTOPENIA: ICD-10-CM

## 2021-01-01 DIAGNOSIS — R29.818: ICD-10-CM

## 2021-01-01 DIAGNOSIS — D49.6 BRAIN TUMOR: ICD-10-CM

## 2021-01-01 DIAGNOSIS — G40.901 STATUS EPILEPTICUS: ICD-10-CM

## 2021-01-01 DIAGNOSIS — S32.050A CLOSED COMPRESSION FRACTURE OF L5 VERTEBRA, INITIAL ENCOUNTER: ICD-10-CM

## 2021-01-01 DIAGNOSIS — S22.080A COMPRESSION FRACTURE OF T12 VERTEBRA, INITIAL ENCOUNTER: Primary | ICD-10-CM

## 2021-01-01 DIAGNOSIS — C79.31 MELANOMA METASTATIC TO BRAIN: ICD-10-CM

## 2021-01-01 DIAGNOSIS — G93.5 BRAIN COMPRESSION: ICD-10-CM

## 2021-01-01 DIAGNOSIS — J96.01 ACUTE RESPIRATORY FAILURE WITH HYPOXIA: ICD-10-CM

## 2021-01-01 DIAGNOSIS — I95.9 HYPOTENSION, UNSPECIFIED HYPOTENSION TYPE: ICD-10-CM

## 2021-01-01 DIAGNOSIS — R65.21 SEPTIC SHOCK: ICD-10-CM

## 2021-01-01 DIAGNOSIS — C79.31 BRAIN METASTASES: ICD-10-CM

## 2021-01-01 DIAGNOSIS — I50.21 ACUTE SYSTOLIC CONGESTIVE HEART FAILURE: ICD-10-CM

## 2021-01-01 DIAGNOSIS — E83.51 HYPOCALCEMIA: ICD-10-CM

## 2021-01-01 DIAGNOSIS — G93.40 ACUTE ENCEPHALOPATHY: ICD-10-CM

## 2021-01-01 DIAGNOSIS — Z11.59 NEED FOR HEPATITIS C SCREENING TEST: ICD-10-CM

## 2021-01-01 DIAGNOSIS — A41.9 SEPTIC SHOCK: ICD-10-CM

## 2021-01-01 DIAGNOSIS — S32.030A CLOSED COMPRESSION FRACTURE OF L3 VERTEBRA, INITIAL ENCOUNTER: ICD-10-CM

## 2021-01-01 DIAGNOSIS — Z12.31 OTHER SCREENING MAMMOGRAM: ICD-10-CM

## 2021-01-01 DIAGNOSIS — R40.2430 GLASGOW COMA SCALE TOTAL SCORE 3-8: ICD-10-CM

## 2021-01-01 DIAGNOSIS — E88.09 HYPOALBUMINEMIA: ICD-10-CM

## 2021-01-01 DIAGNOSIS — E83.42 HYPOMAGNESEMIA: ICD-10-CM

## 2021-01-01 DIAGNOSIS — R06.02 SHORTNESS OF BREATH: ICD-10-CM

## 2021-01-01 DIAGNOSIS — G40.901 STATUS EPILEPTICUS: Primary | ICD-10-CM

## 2021-01-01 DIAGNOSIS — Z99.11: ICD-10-CM

## 2021-01-01 DIAGNOSIS — G93.6 CYTOTOXIC CEREBRAL EDEMA: ICD-10-CM

## 2021-01-01 DIAGNOSIS — R00.0 TACHYCARDIA: ICD-10-CM

## 2021-01-01 DIAGNOSIS — E87.6 HYPOKALEMIA: ICD-10-CM

## 2021-01-01 DIAGNOSIS — E87.29 METABOLIC ACIDOSIS, INCREASED ANION GAP (IAG): ICD-10-CM

## 2021-01-01 LAB
ABO + RH BLD: NORMAL
ALBUMIN SERPL BCP-MCNC: 1.4 G/DL (ref 3.5–5.2)
ALBUMIN SERPL BCP-MCNC: 1.4 G/DL (ref 3.5–5.2)
ALBUMIN SERPL BCP-MCNC: 1.6 G/DL (ref 3.5–5.2)
ALBUMIN SERPL BCP-MCNC: 1.6 G/DL (ref 3.5–5.2)
ALBUMIN SERPL BCP-MCNC: 2.9 G/DL (ref 3.5–5.2)
ALLENS TEST: ABNORMAL
ALP SERPL-CCNC: 120 U/L (ref 55–135)
ALP SERPL-CCNC: 65 U/L (ref 55–135)
ALP SERPL-CCNC: 67 U/L (ref 55–135)
ALP SERPL-CCNC: 83 U/L (ref 55–135)
ALP SERPL-CCNC: 92 U/L (ref 55–135)
ALT SERPL W/O P-5'-P-CCNC: 28 U/L (ref 10–44)
ALT SERPL W/O P-5'-P-CCNC: 33 U/L (ref 10–44)
ALT SERPL W/O P-5'-P-CCNC: 40 U/L (ref 10–44)
ALT SERPL W/O P-5'-P-CCNC: 44 U/L (ref 10–44)
ALT SERPL W/O P-5'-P-CCNC: 48 U/L (ref 10–44)
AMPHET+METHAMPHET UR QL: NEGATIVE
AMYLASE SERPL-CCNC: 33 U/L (ref 20–110)
ANION GAP SERPL CALC-SCNC: 12 MMOL/L (ref 8–16)
ANION GAP SERPL CALC-SCNC: 12 MMOL/L (ref 8–16)
ANION GAP SERPL CALC-SCNC: 14 MMOL/L (ref 8–16)
ANION GAP SERPL CALC-SCNC: 19 MMOL/L (ref 8–16)
ANION GAP SERPL CALC-SCNC: 20 MMOL/L (ref 8–16)
ANISOCYTOSIS BLD QL SMEAR: SLIGHT
APTT BLDCRRT: 30.2 SEC (ref 21–32)
ASCENDING AORTA: 2.9 CM
AST SERPL-CCNC: 15 U/L (ref 10–40)
AST SERPL-CCNC: 17 U/L (ref 10–40)
AST SERPL-CCNC: 22 U/L (ref 10–40)
AST SERPL-CCNC: 27 U/L (ref 10–40)
AST SERPL-CCNC: 30 U/L (ref 10–40)
AV INDEX (PROSTH): 0.98
AV MEAN GRADIENT: 2 MMHG
AV PEAK GRADIENT: 4 MMHG
AV VALVE AREA: 3.49 CM2
AV VELOCITY RATIO: 0.85
BACTERIA #/AREA URNS AUTO: ABNORMAL /HPF
BACTERIA #/AREA URNS HPF: ABNORMAL /HPF
BACTERIA BLD CULT: NORMAL
BACTERIA BLD CULT: NORMAL
BACTERIA SPEC AEROBE CULT: ABNORMAL
BACTERIA SPEC AEROBE CULT: ABNORMAL
BARBITURATES UR QL SCN>200 NG/ML: NEGATIVE
BASO STIPL BLD QL SMEAR: ABNORMAL
BASO STIPL BLD QL SMEAR: ABNORMAL
BASOPHILS # BLD AUTO: 0 K/UL (ref 0–0.2)
BASOPHILS # BLD AUTO: 0.01 K/UL (ref 0–0.2)
BASOPHILS # BLD AUTO: 0.03 K/UL (ref 0–0.2)
BASOPHILS # BLD AUTO: ABNORMAL K/UL (ref 0–0.2)
BASOPHILS # BLD AUTO: ABNORMAL K/UL (ref 0–0.2)
BASOPHILS NFR BLD: 0 % (ref 0–1.9)
BASOPHILS NFR BLD: 0.4 % (ref 0–1.9)
BASOPHILS NFR BLD: 0.9 % (ref 0–1.9)
BENZODIAZ UR QL SCN>200 NG/ML: NEGATIVE
BILIRUB SERPL-MCNC: 0.5 MG/DL (ref 0.1–1)
BILIRUB SERPL-MCNC: 0.6 MG/DL (ref 0.1–1)
BILIRUB SERPL-MCNC: 0.9 MG/DL (ref 0.1–1)
BILIRUB SERPL-MCNC: 1.1 MG/DL (ref 0.1–1)
BILIRUB SERPL-MCNC: 1.8 MG/DL (ref 0.1–1)
BILIRUB UR QL STRIP: NEGATIVE
BLD GP AB SCN CELLS X3 SERPL QL: NORMAL
BLOOD GROUP ANTIBODIES SERPL: NORMAL
BNP SERPL-MCNC: 107 PG/ML (ref 0–99)
BSA FOR ECHO PROCEDURE: 1.87 M2
BUN SERPL-MCNC: 22 MG/DL (ref 6–20)
BUN SERPL-MCNC: 22 MG/DL (ref 6–20)
BUN SERPL-MCNC: 25 MG/DL (ref 6–20)
BUN SERPL-MCNC: 32 MG/DL (ref 6–20)
BUN SERPL-MCNC: 44 MG/DL (ref 6–20)
BZE UR QL SCN: NEGATIVE
CALCIUM SERPL-MCNC: 7.2 MG/DL (ref 8.7–10.5)
CALCIUM SERPL-MCNC: 7.7 MG/DL (ref 8.7–10.5)
CALCIUM SERPL-MCNC: 8 MG/DL (ref 8.7–10.5)
CALCIUM SERPL-MCNC: 8 MG/DL (ref 8.7–10.5)
CALCIUM SERPL-MCNC: 8.9 MG/DL (ref 8.7–10.5)
CANNABINOIDS UR QL SCN: NEGATIVE
CHLORIDE SERPL-SCNC: 104 MMOL/L (ref 95–110)
CHLORIDE SERPL-SCNC: 107 MMOL/L (ref 95–110)
CHLORIDE SERPL-SCNC: 95 MMOL/L (ref 95–110)
CHLORIDE SERPL-SCNC: 98 MMOL/L (ref 95–110)
CHLORIDE SERPL-SCNC: 99 MMOL/L (ref 95–110)
CK SERPL-CCNC: 406 U/L (ref 20–180)
CLARITY UR REFRACT.AUTO: ABNORMAL
CLARITY UR: CLEAR
CLARITY UR: CLEAR
CO2 SERPL-SCNC: 15 MMOL/L (ref 23–29)
CO2 SERPL-SCNC: 16 MMOL/L (ref 23–29)
CO2 SERPL-SCNC: 21 MMOL/L (ref 23–29)
CO2 SERPL-SCNC: 24 MMOL/L (ref 23–29)
CO2 SERPL-SCNC: 25 MMOL/L (ref 23–29)
COLOR UR AUTO: ABNORMAL
COLOR UR: YELLOW
COLOR UR: YELLOW
CREAT SERPL-MCNC: 0.8 MG/DL (ref 0.5–1.4)
CREAT SERPL-MCNC: 0.8 MG/DL (ref 0.5–1.4)
CREAT SERPL-MCNC: 0.9 MG/DL (ref 0.5–1.4)
CREAT SERPL-MCNC: 1.2 MG/DL (ref 0.5–1.4)
CREAT SERPL-MCNC: 1.6 MG/DL (ref 0.5–1.4)
CREAT UR-MCNC: 85.5 MG/DL (ref 15–325)
CV ECHO LV RWT: 0.34 CM
D DIMER PPP IA.FEU-MCNC: 8.88 MG/L FEU
DELSYS: ABNORMAL
DIFFERENTIAL METHOD: ABNORMAL
DOHLE BOD BLD QL SMEAR: PRESENT
DOP CALC AO PEAK VEL: 0.94 M/S
DOP CALC AO VTI: 10.12 CM
DOP CALC LVOT AREA: 3.6 CM2
DOP CALC LVOT DIAMETER: 2.13 CM
DOP CALC LVOT PEAK VEL: 0.8 M/S
DOP CALC LVOT STROKE VOLUME: 35.33 CM3
DOP CALCLVOT PEAK VEL VTI: 9.92 CM
ECHO LV POSTERIOR WALL: 0.76 CM (ref 0.6–1.1)
EJECTION FRACTION: 25 %
EOSINOPHIL # BLD AUTO: 0 K/UL (ref 0–0.5)
EOSINOPHIL # BLD AUTO: ABNORMAL K/UL (ref 0–0.5)
EOSINOPHIL # BLD AUTO: ABNORMAL K/UL (ref 0–0.5)
EOSINOPHIL NFR BLD: 0 % (ref 0–8)
EOSINOPHIL NFR BLD: 1 % (ref 0–8)
ERYTHROCYTE [DISTWIDTH] IN BLOOD BY AUTOMATED COUNT: 14.6 % (ref 11.5–14.5)
ERYTHROCYTE [DISTWIDTH] IN BLOOD BY AUTOMATED COUNT: 14.7 % (ref 11.5–14.5)
ERYTHROCYTE [DISTWIDTH] IN BLOOD BY AUTOMATED COUNT: 15 % (ref 11.5–14.5)
ERYTHROCYTE [DISTWIDTH] IN BLOOD BY AUTOMATED COUNT: 15 % (ref 11.5–14.5)
ERYTHROCYTE [DISTWIDTH] IN BLOOD BY AUTOMATED COUNT: 15.3 % (ref 11.5–14.5)
ERYTHROCYTE [DISTWIDTH] IN BLOOD BY AUTOMATED COUNT: 15.4 % (ref 11.5–14.5)
ERYTHROCYTE [DISTWIDTH] IN BLOOD BY AUTOMATED COUNT: 15.5 % (ref 11.5–14.5)
ERYTHROCYTE [SEDIMENTATION RATE] IN BLOOD BY WESTERGREN METHOD: 16 MM/H
EST. GFR  (AFRICAN AMERICAN): 45.8 ML/MIN/1.73 M^2
EST. GFR  (AFRICAN AMERICAN): >60 ML/MIN/1.73 M^2
EST. GFR  (NON AFRICAN AMERICAN): 39.7 ML/MIN/1.73 M^2
EST. GFR  (NON AFRICAN AMERICAN): 56.3 ML/MIN/1.73 M^2
EST. GFR  (NON AFRICAN AMERICAN): >60 ML/MIN/1.73 M^2
FERRITIN SERPL-MCNC: 797 NG/ML (ref 20–300)
FIBRINOGEN PPP-MCNC: 779 MG/DL (ref 182–400)
FIO2: 50
FOLATE SERPL-MCNC: 4.6 NG/ML (ref 4–24)
FRACTIONAL SHORTENING: 15 % (ref 28–44)
GLUCOSE SERPL-MCNC: 153 MG/DL (ref 70–110)
GLUCOSE SERPL-MCNC: 203 MG/DL (ref 70–110)
GLUCOSE SERPL-MCNC: 218 MG/DL (ref 70–110)
GLUCOSE SERPL-MCNC: 242 MG/DL (ref 70–110)
GLUCOSE SERPL-MCNC: 276 MG/DL (ref 70–110)
GLUCOSE UR QL STRIP: ABNORMAL
GLUCOSE UR QL STRIP: ABNORMAL
GLUCOSE UR QL STRIP: NEGATIVE
GRAM STN SPEC: ABNORMAL
HAPTOGLOB SERPL-MCNC: 334 MG/DL (ref 30–250)
HBV CORE AB SERPL QL IA: NEGATIVE
HCO3 UR-SCNC: 12.6 MMOL/L (ref 24–28)
HCO3 UR-SCNC: 17.5 MMOL/L (ref 24–28)
HCO3 UR-SCNC: 18.2 MMOL/L (ref 24–28)
HCO3 UR-SCNC: 19.8 MMOL/L (ref 24–28)
HCO3 UR-SCNC: 25.7 MMOL/L (ref 24–28)
HCO3 UR-SCNC: 26.5 MMOL/L (ref 24–28)
HCT VFR BLD AUTO: 30.9 % (ref 37–48.5)
HCT VFR BLD AUTO: 31.9 % (ref 37–48.5)
HCT VFR BLD AUTO: 32 % (ref 37–48.5)
HCT VFR BLD AUTO: 34.9 % (ref 37–48.5)
HCT VFR BLD AUTO: 40.7 % (ref 37–48.5)
HCT VFR BLD AUTO: 40.7 % (ref 37–48.5)
HCT VFR BLD AUTO: 51.7 % (ref 37–48.5)
HCV AB SERPL QL IA: NEGATIVE
HGB BLD-MCNC: 10.2 G/DL (ref 12–16)
HGB BLD-MCNC: 10.4 G/DL (ref 12–16)
HGB BLD-MCNC: 10.6 G/DL (ref 12–16)
HGB BLD-MCNC: 11.6 G/DL (ref 12–16)
HGB BLD-MCNC: 13.3 G/DL (ref 12–16)
HGB BLD-MCNC: 14.1 G/DL (ref 12–16)
HGB BLD-MCNC: 16.7 G/DL (ref 12–16)
HGB UR QL STRIP: ABNORMAL
HGB UR QL STRIP: NEGATIVE
HGB UR QL STRIP: NEGATIVE
HIV 1+2 AB+HIV1 P24 AG SERPL QL IA: NEGATIVE
HYALINE CASTS #/AREA URNS LPF: 0 /LPF
HYALINE CASTS UR QL AUTO: 1 /LPF
HYPOCHROMIA BLD QL SMEAR: ABNORMAL
HYPOCHROMIA BLD QL SMEAR: ABNORMAL
IMM GRANULOCYTES # BLD AUTO: 0.01 K/UL (ref 0–0.04)
IMM GRANULOCYTES # BLD AUTO: 0.03 K/UL (ref 0–0.04)
IMM GRANULOCYTES # BLD AUTO: 0.03 K/UL (ref 0–0.04)
IMM GRANULOCYTES # BLD AUTO: ABNORMAL K/UL (ref 0–0.04)
IMM GRANULOCYTES NFR BLD AUTO: 0.4 % (ref 0–0.5)
IMM GRANULOCYTES NFR BLD AUTO: 0.9 % (ref 0–0.5)
IMM GRANULOCYTES NFR BLD AUTO: 1.4 % (ref 0–0.5)
IMM GRANULOCYTES NFR BLD AUTO: ABNORMAL % (ref 0–0.5)
INR PPP: 1.3 (ref 0.8–1.2)
INTERVENTRICULAR SEPTUM: 0.87 CM (ref 0.6–1.1)
IRON SERPL-MCNC: 45 UG/DL (ref 30–160)
KETONES UR QL STRIP: NEGATIVE
LA MAJOR: 4.48 CM
LA MINOR: 4.1 CM
LA WIDTH: 3.06 CM
LACTATE SERPL-SCNC: 2.1 MMOL/L (ref 0.5–2.2)
LACTATE SERPL-SCNC: 2.5 MMOL/L (ref 0.5–2.2)
LDH SERPL L TO P-CCNC: 338 U/L (ref 110–260)
LDH SERPL L TO P-CCNC: 4.56 MMOL/L (ref 0.36–1.25)
LEFT ATRIUM SIZE: 2.48 CM
LEFT ATRIUM VOLUME INDEX MOD: 23.4 ML/M2
LEFT ATRIUM VOLUME INDEX: 15.2 ML/M2
LEFT ATRIUM VOLUME MOD: 42.54 CM3
LEFT ATRIUM VOLUME: 27.62 CM3
LEFT INTERNAL DIMENSION IN SYSTOLE: 3.85 CM (ref 2.1–4)
LEFT VENTRICLE DIASTOLIC VOLUME INDEX: 51.69 ML/M2
LEFT VENTRICLE DIASTOLIC VOLUME: 94.07 ML
LEFT VENTRICLE MASS INDEX: 65 G/M2
LEFT VENTRICLE SYSTOLIC VOLUME INDEX: 35.1 ML/M2
LEFT VENTRICLE SYSTOLIC VOLUME: 63.83 ML
LEFT VENTRICULAR INTERNAL DIMENSION IN DIASTOLE: 4.53 CM (ref 3.5–6)
LEFT VENTRICULAR MASS: 117.73 G
LEUKOCYTE ESTERASE UR QL STRIP: NEGATIVE
LIPASE SERPL-CCNC: 23 U/L (ref 4–60)
LYMPHOCYTES # BLD AUTO: 0.3 K/UL (ref 1–4.8)
LYMPHOCYTES # BLD AUTO: ABNORMAL K/UL (ref 1–4.8)
LYMPHOCYTES # BLD AUTO: ABNORMAL K/UL (ref 1–4.8)
LYMPHOCYTES NFR BLD: 10 % (ref 18–48)
LYMPHOCYTES NFR BLD: 12.2 % (ref 18–48)
LYMPHOCYTES NFR BLD: 15.6 % (ref 18–48)
LYMPHOCYTES NFR BLD: 2 % (ref 18–48)
LYMPHOCYTES NFR BLD: 7 % (ref 18–48)
LYMPHOCYTES NFR BLD: 7 % (ref 18–48)
LYMPHOCYTES NFR BLD: 9.3 % (ref 18–48)
MAGNESIUM SERPL-MCNC: 1.2 MG/DL (ref 1.6–2.6)
MAGNESIUM SERPL-MCNC: 1.7 MG/DL (ref 1.6–2.6)
MAGNESIUM SERPL-MCNC: 1.7 MG/DL (ref 1.6–2.6)
MAGNESIUM SERPL-MCNC: 1.8 MG/DL (ref 1.6–2.6)
MAGNESIUM SERPL-MCNC: 1.8 MG/DL (ref 1.6–2.6)
MAGNESIUM SERPL-MCNC: 1.9 MG/DL (ref 1.6–2.6)
MCH RBC QN AUTO: 30 PG (ref 27–31)
MCH RBC QN AUTO: 30.1 PG (ref 27–31)
MCH RBC QN AUTO: 30.2 PG (ref 27–31)
MCH RBC QN AUTO: 30.4 PG (ref 27–31)
MCH RBC QN AUTO: 30.5 PG (ref 27–31)
MCH RBC QN AUTO: 30.6 PG (ref 27–31)
MCH RBC QN AUTO: 30.8 PG (ref 27–31)
MCHC RBC AUTO-ENTMCNC: 32.3 G/DL (ref 32–36)
MCHC RBC AUTO-ENTMCNC: 32.5 G/DL (ref 32–36)
MCHC RBC AUTO-ENTMCNC: 32.7 G/DL (ref 32–36)
MCHC RBC AUTO-ENTMCNC: 33 G/DL (ref 32–36)
MCHC RBC AUTO-ENTMCNC: 33.2 G/DL (ref 32–36)
MCHC RBC AUTO-ENTMCNC: 33.2 G/DL (ref 32–36)
MCHC RBC AUTO-ENTMCNC: 34.6 G/DL (ref 32–36)
MCV RBC AUTO: 89 FL (ref 82–98)
MCV RBC AUTO: 90 FL (ref 82–98)
MCV RBC AUTO: 92 FL (ref 82–98)
MCV RBC AUTO: 93 FL (ref 82–98)
MCV RBC AUTO: 95 FL (ref 82–98)
METAMYELOCYTES NFR BLD MANUAL: 1 %
METAMYELOCYTES NFR BLD MANUAL: 3 %
METHADONE UR QL SCN>300 NG/ML: NEGATIVE
MICROSCOPIC COMMENT: ABNORMAL
MICROSCOPIC COMMENT: ABNORMAL
MIN VOL: 7
MIN VOL: 7.61
MODE: ABNORMAL
MONOCYTES # BLD AUTO: 0.1 K/UL (ref 0.3–1)
MONOCYTES # BLD AUTO: 0.2 K/UL (ref 0.3–1)
MONOCYTES # BLD AUTO: 0.2 K/UL (ref 0.3–1)
MONOCYTES # BLD AUTO: ABNORMAL K/UL (ref 0.3–1)
MONOCYTES # BLD AUTO: ABNORMAL K/UL (ref 0.3–1)
MONOCYTES NFR BLD: 3 % (ref 4–15)
MONOCYTES NFR BLD: 4 % (ref 4–15)
MONOCYTES NFR BLD: 5 % (ref 4–15)
MONOCYTES NFR BLD: 5 % (ref 4–15)
MONOCYTES NFR BLD: 6 % (ref 4–15)
MONOCYTES NFR BLD: 6.5 % (ref 4–15)
MONOCYTES NFR BLD: 6.6 % (ref 4–15)
NEUTROPHILS # BLD AUTO: 1.6 K/UL (ref 1.8–7.7)
NEUTROPHILS # BLD AUTO: 2 K/UL (ref 1.8–7.7)
NEUTROPHILS # BLD AUTO: 2.7 K/UL (ref 1.8–7.7)
NEUTROPHILS NFR BLD: 69 % (ref 38–73)
NEUTROPHILS NFR BLD: 73 % (ref 38–73)
NEUTROPHILS NFR BLD: 76.4 % (ref 38–73)
NEUTROPHILS NFR BLD: 80.5 % (ref 38–73)
NEUTROPHILS NFR BLD: 83.9 % (ref 38–73)
NEUTROPHILS NFR BLD: 87 % (ref 38–73)
NEUTROPHILS NFR BLD: 90 % (ref 38–73)
NEUTS BAND NFR BLD MANUAL: 12 %
NEUTS BAND NFR BLD MANUAL: 15 %
NEUTS BAND NFR BLD MANUAL: 5 %
NITRITE UR QL STRIP: NEGATIVE
NRBC BLD-RTO: 0 /100 WBC
NRBC BLD-RTO: 1 /100 WBC
OPIATES UR QL SCN: ABNORMAL
OVALOCYTES BLD QL SMEAR: ABNORMAL
OVALOCYTES BLD QL SMEAR: ABNORMAL
PATH REV BLD -IMP: NORMAL
PATH REV BLD -IMP: NORMAL
PCO2 BLDA: 24.7 MMHG (ref 35–45)
PCO2 BLDA: 25.9 MMHG (ref 35–45)
PCO2 BLDA: 26.9 MMHG (ref 35–45)
PCO2 BLDA: 30.5 MMHG (ref 35–45)
PCO2 BLDA: 31.6 MMHG (ref 35–45)
PCO2 BLDA: 33.2 MMHG (ref 35–45)
PCP UR QL SCN>25 NG/ML: NEGATIVE
PEEP: 5
PEEP: 8
PH SMN: 7.32 [PH] (ref 7.35–7.45)
PH SMN: 7.38 [PH] (ref 7.35–7.45)
PH SMN: 7.44 [PH] (ref 7.35–7.45)
PH SMN: 7.44 [PH] (ref 7.35–7.45)
PH SMN: 7.53 [PH] (ref 7.35–7.45)
PH SMN: 7.53 [PH] (ref 7.35–7.45)
PH UR STRIP: 5 [PH] (ref 5–8)
PHOSPHATE SERPL-MCNC: 2.1 MG/DL (ref 2.7–4.5)
PHOSPHATE SERPL-MCNC: 2.2 MG/DL (ref 2.7–4.5)
PHOSPHATE SERPL-MCNC: 2.5 MG/DL (ref 2.7–4.5)
PHOSPHATE SERPL-MCNC: 3.1 MG/DL (ref 2.7–4.5)
PHOSPHATE SERPL-MCNC: 4.5 MG/DL (ref 2.7–4.5)
PIP: 24
PIP: 25
PLATELET # BLD AUTO: 145 K/UL (ref 150–450)
PLATELET # BLD AUTO: 26 K/UL (ref 150–450)
PLATELET # BLD AUTO: 32 K/UL (ref 150–450)
PLATELET # BLD AUTO: 32 K/UL (ref 150–450)
PLATELET # BLD AUTO: 41 K/UL (ref 150–450)
PLATELET # BLD AUTO: 67 K/UL (ref 150–450)
PLATELET # BLD AUTO: 89 K/UL (ref 150–450)
PLATELET BLD QL SMEAR: ABNORMAL
PMV BLD AUTO: 11 FL (ref 9.2–12.9)
PMV BLD AUTO: 11.5 FL (ref 9.2–12.9)
PMV BLD AUTO: 11.5 FL (ref 9.2–12.9)
PMV BLD AUTO: 11.9 FL (ref 9.2–12.9)
PMV BLD AUTO: 12 FL (ref 9.2–12.9)
PMV BLD AUTO: 12.1 FL (ref 9.2–12.9)
PMV BLD AUTO: 12.2 FL (ref 9.2–12.9)
PO2 BLDA: 112 MMHG (ref 80–100)
PO2 BLDA: 52 MMHG (ref 80–100)
PO2 BLDA: 61 MMHG (ref 80–100)
PO2 BLDA: 61 MMHG (ref 80–100)
PO2 BLDA: 70 MMHG (ref 80–100)
PO2 BLDA: 84 MMHG (ref 80–100)
POC BE: -14 MMOL/L
POC BE: -5 MMOL/L
POC BE: -6 MMOL/L
POC BE: -7 MMOL/L
POC BE: 3 MMOL/L
POC BE: 4 MMOL/L
POC PCO2 TEMP: 30.5 MMHG
POC PH TEMP: 7.53
POC PO2 TEMP: 52 MMHG
POC SATURATED O2: 91 % (ref 95–100)
POC SATURATED O2: 92 % (ref 95–100)
POC SATURATED O2: 92 % (ref 95–100)
POC SATURATED O2: 93 % (ref 95–100)
POC SATURATED O2: 96 % (ref 95–100)
POC SATURATED O2: 99 % (ref 95–100)
POC TCO2: 13 MMOL/L (ref 23–27)
POC TCO2: 18 MMOL/L (ref 23–27)
POC TCO2: 19 MMOL/L (ref 23–27)
POC TCO2: 21 MMOL/L (ref 23–27)
POC TCO2: 27 MMOL/L (ref 23–27)
POC TCO2: 27 MMOL/L (ref 23–27)
POC TEMPERATURE: ABNORMAL
POCT GLUCOSE: 171 MG/DL (ref 70–110)
POCT GLUCOSE: 204 MG/DL (ref 70–110)
POCT GLUCOSE: 224 MG/DL (ref 70–110)
POCT GLUCOSE: 225 MG/DL (ref 70–110)
POCT GLUCOSE: 227 MG/DL (ref 70–110)
POCT GLUCOSE: 246 MG/DL (ref 70–110)
POCT GLUCOSE: 251 MG/DL (ref 70–110)
POCT GLUCOSE: 255 MG/DL (ref 70–110)
POCT GLUCOSE: 257 MG/DL (ref 70–110)
POCT GLUCOSE: 263 MG/DL (ref 70–110)
POCT GLUCOSE: 267 MG/DL (ref 70–110)
POCT GLUCOSE: 269 MG/DL (ref 70–110)
POCT GLUCOSE: 269 MG/DL (ref 70–110)
POCT GLUCOSE: 270 MG/DL (ref 70–110)
POCT GLUCOSE: 275 MG/DL (ref 70–110)
POCT GLUCOSE: 281 MG/DL (ref 70–110)
POCT GLUCOSE: 282 MG/DL (ref 70–110)
POCT GLUCOSE: 284 MG/DL (ref 70–110)
POCT GLUCOSE: 288 MG/DL (ref 70–110)
POCT GLUCOSE: 312 MG/DL (ref 70–110)
POCT GLUCOSE: 315 MG/DL (ref 70–110)
POCT GLUCOSE: 317 MG/DL (ref 70–110)
POCT GLUCOSE: 373 MG/DL (ref 70–110)
POIKILOCYTOSIS BLD QL SMEAR: SLIGHT
POIKILOCYTOSIS BLD QL SMEAR: SLIGHT
POLYCHROMASIA BLD QL SMEAR: ABNORMAL
POTASSIUM SERPL-SCNC: 2.9 MMOL/L (ref 3.5–5.1)
POTASSIUM SERPL-SCNC: 3.2 MMOL/L (ref 3.5–5.1)
POTASSIUM SERPL-SCNC: 3.3 MMOL/L (ref 3.5–5.1)
POTASSIUM SERPL-SCNC: 3.6 MMOL/L (ref 3.5–5.1)
POTASSIUM SERPL-SCNC: 3.9 MMOL/L (ref 3.5–5.1)
POTASSIUM SERPL-SCNC: 3.9 MMOL/L (ref 3.5–5.1)
POTASSIUM SERPL-SCNC: 4.1 MMOL/L (ref 3.5–5.1)
PROT SERPL-MCNC: 4.4 G/DL (ref 6–8.4)
PROT SERPL-MCNC: 4.7 G/DL (ref 6–8.4)
PROT SERPL-MCNC: 6.2 G/DL (ref 6–8.4)
PROT UR QL STRIP: ABNORMAL
PROT UR QL STRIP: ABNORMAL
PROT UR QL STRIP: NEGATIVE
PROTHROMBIN TIME: 14 SEC (ref 9–12.5)
RA MAJOR: 3.74 CM
RA PRESSURE: 3 MMHG
RA WIDTH: 2.86 CM
RBC # BLD AUTO: 3.36 M/UL (ref 4–5.4)
RBC # BLD AUTO: 3.46 M/UL (ref 4–5.4)
RBC # BLD AUTO: 3.48 M/UL (ref 4–5.4)
RBC # BLD AUTO: 3.87 M/UL (ref 4–5.4)
RBC # BLD AUTO: 4.41 M/UL (ref 4–5.4)
RBC # BLD AUTO: 4.58 M/UL (ref 4–5.4)
RBC # BLD AUTO: 5.45 M/UL (ref 4–5.4)
RBC #/AREA URNS AUTO: 7 /HPF (ref 0–4)
RBC #/AREA URNS HPF: 2 /HPF (ref 0–4)
RV TISSUE DOPPLER FREE WALL SYSTOLIC VELOCITY 1 (APICAL 4 CHAMBER VIEW): 10.23 CM/S
SAMPLE: ABNORMAL
SARS-COV-2 RDRP RESP QL NAA+PROBE: NEGATIVE
SATURATED IRON: 26 % (ref 20–50)
SINUS: 3.32 CM
SITE: ABNORMAL
SMUDGE CELLS BLD QL SMEAR: PRESENT
SODIUM SERPL-SCNC: 130 MMOL/L (ref 136–145)
SODIUM SERPL-SCNC: 134 MMOL/L (ref 136–145)
SODIUM SERPL-SCNC: 136 MMOL/L (ref 136–145)
SODIUM SERPL-SCNC: 138 MMOL/L (ref 136–145)
SODIUM SERPL-SCNC: 143 MMOL/L (ref 136–145)
SP GR UR STRIP: 1.02 (ref 1–1.03)
SP GR UR STRIP: 1.02 (ref 1–1.03)
SP GR UR STRIP: >=1.03 (ref 1–1.03)
SP02: 96
SP02: 99
SQUAMOUS #/AREA URNS AUTO: 1 /HPF
SQUAMOUS #/AREA URNS HPF: 2 /HPF
STJ: 2.4 CM
TDI LATERAL: 0.05 M/S
TDI SEPTAL: 0.04 M/S
TDI: 0.05 M/S
TIME NOTIFIED: 1132
TOTAL IRON BINDING CAPACITY: 176 UG/DL (ref 250–450)
TOXIC GRANULES BLD QL SMEAR: PRESENT
TOXICOLOGY INFORMATION: ABNORMAL
TRANSFERRIN SERPL-MCNC: 119 MG/DL (ref 200–375)
TRICUSPID ANNULAR PLANE SYSTOLIC EXCURSION: 1.33 CM
TROPONIN I SERPL DL<=0.01 NG/ML-MCNC: 0.07 NG/ML (ref 0–0.03)
TROPONIN I SERPL DL<=0.01 NG/ML-MCNC: 0.13 NG/ML (ref 0–0.03)
URN SPEC COLLECT METH UR: ABNORMAL
UROBILINOGEN UR STRIP-ACNC: NEGATIVE EU/DL
UROBILINOGEN UR STRIP-ACNC: NEGATIVE EU/DL
VANCOMYCIN TROUGH SERPL-MCNC: 15 UG/ML (ref 10–22)
VANCOMYCIN TROUGH SERPL-MCNC: 23.2 UG/ML (ref 10–22)
VIT B12 SERPL-MCNC: 1620 PG/ML (ref 210–950)
VT: 430
VT: 450
VT: 450
WBC # BLD AUTO: 1.82 K/UL (ref 3.9–12.7)
WBC # BLD AUTO: 14.87 K/UL (ref 3.9–12.7)
WBC # BLD AUTO: 2.12 K/UL (ref 3.9–12.7)
WBC # BLD AUTO: 2.46 K/UL (ref 3.9–12.7)
WBC # BLD AUTO: 3.23 K/UL (ref 3.9–12.7)
WBC # BLD AUTO: 6.49 K/UL (ref 3.9–12.7)
WBC # BLD AUTO: 8.24 K/UL (ref 3.9–12.7)
WBC #/AREA URNS AUTO: 10 /HPF (ref 0–5)
WBC #/AREA URNS HPF: 1 /HPF (ref 0–5)
WBC TOXIC VACUOLES BLD QL SMEAR: PRESENT
WBC TOXIC VACUOLES BLD QL SMEAR: PRESENT

## 2021-01-01 PROCEDURE — 99900035 HC TECH TIME PER 15 MIN (STAT)

## 2021-01-01 PROCEDURE — 85025 COMPLETE CBC W/AUTO DIFF WBC: CPT | Performed by: NURSE PRACTITIONER

## 2021-01-01 PROCEDURE — 25000003 PHARM REV CODE 250: Performed by: STUDENT IN AN ORGANIZED HEALTH CARE EDUCATION/TRAINING PROGRAM

## 2021-01-01 PROCEDURE — 25000003 PHARM REV CODE 250: Performed by: NURSE PRACTITIONER

## 2021-01-01 PROCEDURE — A9585 GADOBUTROL INJECTION: HCPCS | Performed by: PSYCHIATRY & NEUROLOGY

## 2021-01-01 PROCEDURE — U0002 COVID-19 LAB TEST NON-CDC: HCPCS | Performed by: INTERNAL MEDICINE

## 2021-01-01 PROCEDURE — 85027 COMPLETE CBC AUTOMATED: CPT | Performed by: NURSE PRACTITIONER

## 2021-01-01 PROCEDURE — 36600 WITHDRAWAL OF ARTERIAL BLOOD: CPT

## 2021-01-01 PROCEDURE — 25000003 PHARM REV CODE 250: Performed by: PSYCHIATRY & NEUROLOGY

## 2021-01-01 PROCEDURE — 63600175 PHARM REV CODE 636 W HCPCS: Performed by: NURSE PRACTITIONER

## 2021-01-01 PROCEDURE — 99497 PR ADVNCD CARE PLAN 30 MIN: ICD-10-PCS | Mod: 25,,, | Performed by: NURSE PRACTITIONER

## 2021-01-01 PROCEDURE — 84484 ASSAY OF TROPONIN QUANT: CPT | Performed by: NURSE PRACTITIONER

## 2021-01-01 PROCEDURE — 85007 BL SMEAR W/DIFF WBC COUNT: CPT

## 2021-01-01 PROCEDURE — 99233 PR SUBSEQUENT HOSPITAL CARE,LEVL III: ICD-10-PCS | Mod: ,,, | Performed by: NURSE PRACTITIONER

## 2021-01-01 PROCEDURE — 36620 INSERTION CATHETER ARTERY: CPT | Mod: ,,, | Performed by: PSYCHIATRY & NEUROLOGY

## 2021-01-01 PROCEDURE — 20000000 HC ICU ROOM

## 2021-01-01 PROCEDURE — 99291 PR CRITICAL CARE, E/M 30-74 MINUTES: ICD-10-PCS | Mod: ,,, | Performed by: PSYCHIATRY & NEUROLOGY

## 2021-01-01 PROCEDURE — 82803 BLOOD GASES ANY COMBINATION: CPT

## 2021-01-01 PROCEDURE — 25000003 PHARM REV CODE 250: Performed by: PHYSICIAN ASSISTANT

## 2021-01-01 PROCEDURE — S0030 INJECTION, METRONIDAZOLE: HCPCS | Performed by: STUDENT IN AN ORGANIZED HEALTH CARE EDUCATION/TRAINING PROGRAM

## 2021-01-01 PROCEDURE — 25000242 PHARM REV CODE 250 ALT 637 W/ HCPCS: Performed by: NURSE PRACTITIONER

## 2021-01-01 PROCEDURE — 82150 ASSAY OF AMYLASE: CPT | Performed by: PSYCHIATRY & NEUROLOGY

## 2021-01-01 PROCEDURE — 87070 CULTURE OTHR SPECIMN AEROBIC: CPT | Performed by: PSYCHIATRY & NEUROLOGY

## 2021-01-01 PROCEDURE — 95718 EEG PHYS/QHP 2-12 HR W/VEEG: CPT | Mod: ,,, | Performed by: PSYCHIATRY & NEUROLOGY

## 2021-01-01 PROCEDURE — 99900026 HC AIRWAY MAINTENANCE (STAT)

## 2021-01-01 PROCEDURE — 37799 UNLISTED PX VASCULAR SURGERY: CPT

## 2021-01-01 PROCEDURE — 80053 COMPREHEN METABOLIC PANEL: CPT | Performed by: INTERNAL MEDICINE

## 2021-01-01 PROCEDURE — 86803 HEPATITIS C AB TEST: CPT

## 2021-01-01 PROCEDURE — 82550 ASSAY OF CK (CPK): CPT | Performed by: PSYCHIATRY & NEUROLOGY

## 2021-01-01 PROCEDURE — 63600175 PHARM REV CODE 636 W HCPCS: Performed by: PHYSICIAN ASSISTANT

## 2021-01-01 PROCEDURE — 94002 VENT MGMT INPAT INIT DAY: CPT

## 2021-01-01 PROCEDURE — 36620 ARTERIAL LINE: ICD-10-PCS | Mod: ,,, | Performed by: PSYCHIATRY & NEUROLOGY

## 2021-01-01 PROCEDURE — 83615 LACTATE (LD) (LDH) ENZYME: CPT

## 2021-01-01 PROCEDURE — 96365 THER/PROPH/DIAG IV INF INIT: CPT

## 2021-01-01 PROCEDURE — 27100171 HC OXYGEN HIGH FLOW UP TO 24 HOURS

## 2021-01-01 PROCEDURE — 99291 CRITICAL CARE FIRST HOUR: CPT | Mod: ,,, | Performed by: PSYCHIATRY & NEUROLOGY

## 2021-01-01 PROCEDURE — 72131 CT LUMBAR SPINE W/O DYE: CPT | Mod: 26,,, | Performed by: RADIOLOGY

## 2021-01-01 PROCEDURE — 83605 ASSAY OF LACTIC ACID: CPT | Performed by: NURSE PRACTITIONER

## 2021-01-01 PROCEDURE — 85610 PROTHROMBIN TIME: CPT

## 2021-01-01 PROCEDURE — 96376 TX/PRO/DX INJ SAME DRUG ADON: CPT | Mod: 59

## 2021-01-01 PROCEDURE — 95718 PR EEG, W/VIDEO, CONT RECORD, I&R, 2-12 HRS: ICD-10-PCS | Mod: ,,, | Performed by: PSYCHIATRY & NEUROLOGY

## 2021-01-01 PROCEDURE — 84132 ASSAY OF SERUM POTASSIUM: CPT

## 2021-01-01 PROCEDURE — 80053 COMPREHEN METABOLIC PANEL: CPT | Performed by: NURSE PRACTITIONER

## 2021-01-01 PROCEDURE — 87040 BLOOD CULTURE FOR BACTERIA: CPT | Mod: 59 | Performed by: STUDENT IN AN ORGANIZED HEALTH CARE EDUCATION/TRAINING PROGRAM

## 2021-01-01 PROCEDURE — 63600175 PHARM REV CODE 636 W HCPCS: Performed by: PSYCHIATRY & NEUROLOGY

## 2021-01-01 PROCEDURE — 84484 ASSAY OF TROPONIN QUANT: CPT | Performed by: INTERNAL MEDICINE

## 2021-01-01 PROCEDURE — 99223 PR INITIAL HOSPITAL CARE,LEVL III: ICD-10-PCS | Mod: ,,, | Performed by: NURSE PRACTITIONER

## 2021-01-01 PROCEDURE — 70450 CT HEAD/BRAIN W/O DYE: CPT | Mod: TC,ME

## 2021-01-01 PROCEDURE — 83010 ASSAY OF HAPTOGLOBIN QUANT: CPT

## 2021-01-01 PROCEDURE — 99222 1ST HOSP IP/OBS MODERATE 55: CPT | Mod: ,,, | Performed by: INTERNAL MEDICINE

## 2021-01-01 PROCEDURE — 94761 N-INVAS EAR/PLS OXIMETRY MLT: CPT

## 2021-01-01 PROCEDURE — 72131 CT LUMBAR SPINE WITHOUT CONTRAST: ICD-10-PCS | Mod: 26,,, | Performed by: RADIOLOGY

## 2021-01-01 PROCEDURE — 99233 PR SUBSEQUENT HOSPITAL CARE,LEVL III: ICD-10-PCS | Mod: ,,, | Performed by: PSYCHIATRY & NEUROLOGY

## 2021-01-01 PROCEDURE — 83735 ASSAY OF MAGNESIUM: CPT | Performed by: NURSE PRACTITIONER

## 2021-01-01 PROCEDURE — 99238 HOSP IP/OBS DSCHRG MGMT 30/<: CPT | Mod: ,,, | Performed by: NURSE PRACTITIONER

## 2021-01-01 PROCEDURE — 86704 HEP B CORE ANTIBODY TOTAL: CPT

## 2021-01-01 PROCEDURE — 63600175 PHARM REV CODE 636 W HCPCS

## 2021-01-01 PROCEDURE — 85027 COMPLETE CBC AUTOMATED: CPT

## 2021-01-01 PROCEDURE — 86870 RBC ANTIBODY IDENTIFICATION: CPT | Performed by: PSYCHIATRY & NEUROLOGY

## 2021-01-01 PROCEDURE — 99284 EMERGENCY DEPT VISIT MOD MDM: CPT | Mod: 25

## 2021-01-01 PROCEDURE — 63600175 PHARM REV CODE 636 W HCPCS: Performed by: STUDENT IN AN ORGANIZED HEALTH CARE EDUCATION/TRAINING PROGRAM

## 2021-01-01 PROCEDURE — 83605 ASSAY OF LACTIC ACID: CPT

## 2021-01-01 PROCEDURE — 85007 BL SMEAR W/DIFF WBC COUNT: CPT | Mod: 91 | Performed by: NURSE PRACTITIONER

## 2021-01-01 PROCEDURE — 27000221 HC OXYGEN, UP TO 24 HOURS

## 2021-01-01 PROCEDURE — 99222 PR INITIAL HOSPITAL CARE,LEVL II: ICD-10-PCS | Mod: ,,, | Performed by: INTERNAL MEDICINE

## 2021-01-01 PROCEDURE — 93005 ELECTROCARDIOGRAM TRACING: CPT

## 2021-01-01 PROCEDURE — 85730 THROMBOPLASTIN TIME PARTIAL: CPT

## 2021-01-01 PROCEDURE — 99223 1ST HOSP IP/OBS HIGH 75: CPT | Mod: ,,, | Performed by: NURSE PRACTITIONER

## 2021-01-01 PROCEDURE — 94003 VENT MGMT INPAT SUBQ DAY: CPT

## 2021-01-01 PROCEDURE — 80202 ASSAY OF VANCOMYCIN: CPT | Performed by: PSYCHIATRY & NEUROLOGY

## 2021-01-01 PROCEDURE — 85027 COMPLETE CBC AUTOMATED: CPT | Mod: 91 | Performed by: NURSE PRACTITIONER

## 2021-01-01 PROCEDURE — 99233 SBSQ HOSP IP/OBS HIGH 50: CPT | Mod: ,,, | Performed by: NURSE PRACTITIONER

## 2021-01-01 PROCEDURE — 99498 PR ADVNCD CARE PLAN ADDL 30 MIN: ICD-10-PCS | Mod: ,,, | Performed by: NURSE PRACTITIONER

## 2021-01-01 PROCEDURE — 80307 DRUG TEST PRSMV CHEM ANLYZR: CPT | Performed by: INTERNAL MEDICINE

## 2021-01-01 PROCEDURE — 96374 THER/PROPH/DIAG INJ IV PUSH: CPT | Mod: 59

## 2021-01-01 PROCEDURE — 85060 PATHOLOGIST REVIEW: ICD-10-PCS | Mod: ,,, | Performed by: PATHOLOGY

## 2021-01-01 PROCEDURE — 99497 PR ADVNCD CARE PLAN 30 MIN: ICD-10-PCS | Mod: ,,, | Performed by: PSYCHIATRY & NEUROLOGY

## 2021-01-01 PROCEDURE — 87205 SMEAR GRAM STAIN: CPT | Performed by: PSYCHIATRY & NEUROLOGY

## 2021-01-01 PROCEDURE — 70450 CT HEAD/BRAIN W/O DYE: CPT | Mod: 26,76,, | Performed by: RADIOLOGY

## 2021-01-01 PROCEDURE — 25500020 PHARM REV CODE 255: Performed by: PSYCHIATRY & NEUROLOGY

## 2021-01-01 PROCEDURE — 99497 ADVNCD CARE PLAN 30 MIN: CPT | Mod: ,,, | Performed by: PSYCHIATRY & NEUROLOGY

## 2021-01-01 PROCEDURE — 99233 SBSQ HOSP IP/OBS HIGH 50: CPT | Mod: ,,, | Performed by: PSYCHIATRY & NEUROLOGY

## 2021-01-01 PROCEDURE — 81003 URINALYSIS AUTO W/O SCOPE: CPT | Performed by: PHYSICIAN ASSISTANT

## 2021-01-01 PROCEDURE — 85379 FIBRIN DEGRADATION QUANT: CPT | Performed by: NURSE PRACTITIONER

## 2021-01-01 PROCEDURE — 85007 BL SMEAR W/DIFF WBC COUNT: CPT | Performed by: NURSE PRACTITIONER

## 2021-01-01 PROCEDURE — 72131 CT LUMBAR SPINE W/O DYE: CPT | Mod: TC

## 2021-01-01 PROCEDURE — 96366 THER/PROPH/DIAG IV INF ADDON: CPT

## 2021-01-01 PROCEDURE — 87186 SC STD MICRODIL/AGAR DIL: CPT | Performed by: PSYCHIATRY & NEUROLOGY

## 2021-01-01 PROCEDURE — 99497 ADVNCD CARE PLAN 30 MIN: CPT | Mod: 25,,, | Performed by: NURSE PRACTITIONER

## 2021-01-01 PROCEDURE — 84100 ASSAY OF PHOSPHORUS: CPT | Performed by: NURSE PRACTITIONER

## 2021-01-01 PROCEDURE — 86900 BLOOD TYPING SEROLOGIC ABO: CPT | Performed by: PSYCHIATRY & NEUROLOGY

## 2021-01-01 PROCEDURE — 99223 1ST HOSP IP/OBS HIGH 75: CPT | Mod: ,,, | Performed by: PSYCHIATRY & NEUROLOGY

## 2021-01-01 PROCEDURE — 85384 FIBRINOGEN ACTIVITY: CPT | Performed by: NURSE PRACTITIONER

## 2021-01-01 PROCEDURE — G1004 CDSM NDSC: HCPCS

## 2021-01-01 PROCEDURE — 25000003 PHARM REV CODE 250: Performed by: INTERNAL MEDICINE

## 2021-01-01 PROCEDURE — 84132 ASSAY OF SERUM POTASSIUM: CPT | Performed by: PSYCHIATRY & NEUROLOGY

## 2021-01-01 PROCEDURE — 83880 ASSAY OF NATRIURETIC PEPTIDE: CPT | Performed by: INTERNAL MEDICINE

## 2021-01-01 PROCEDURE — 70450 CT HEAD WITHOUT CONTRAST: ICD-10-PCS | Mod: 26,76,, | Performed by: RADIOLOGY

## 2021-01-01 PROCEDURE — 27200966 HC CLOSED SUCTION SYSTEM

## 2021-01-01 PROCEDURE — 82728 ASSAY OF FERRITIN: CPT

## 2021-01-01 PROCEDURE — 81001 URINALYSIS AUTO W/SCOPE: CPT | Performed by: STUDENT IN AN ORGANIZED HEALTH CARE EDUCATION/TRAINING PROGRAM

## 2021-01-01 PROCEDURE — 85007 BL SMEAR W/DIFF WBC COUNT: CPT | Performed by: INTERNAL MEDICINE

## 2021-01-01 PROCEDURE — 99223 PR INITIAL HOSPITAL CARE,LEVL III: ICD-10-PCS | Mod: ,,, | Performed by: PSYCHIATRY & NEUROLOGY

## 2021-01-01 PROCEDURE — 99498 ADVNCD CARE PLAN ADDL 30 MIN: CPT | Mod: ,,, | Performed by: NURSE PRACTITIONER

## 2021-01-01 PROCEDURE — 84466 ASSAY OF TRANSFERRIN: CPT

## 2021-01-01 PROCEDURE — 82746 ASSAY OF FOLIC ACID SERUM: CPT

## 2021-01-01 PROCEDURE — 82607 VITAMIN B-12: CPT

## 2021-01-01 PROCEDURE — 83690 ASSAY OF LIPASE: CPT | Performed by: PSYCHIATRY & NEUROLOGY

## 2021-01-01 PROCEDURE — 85060 BLOOD SMEAR INTERPRETATION: CPT | Mod: ,,, | Performed by: PATHOLOGY

## 2021-01-01 PROCEDURE — 87389 HIV-1 AG W/HIV-1&-2 AB AG IA: CPT

## 2021-01-01 PROCEDURE — 71045 XR CHEST 1 VIEW FOR LINE/TUBE PLACEMENT: ICD-10-PCS | Mod: 26,76,, | Performed by: RADIOLOGY

## 2021-01-01 PROCEDURE — 86905 BLOOD TYPING RBC ANTIGENS: CPT | Performed by: PSYCHIATRY & NEUROLOGY

## 2021-01-01 PROCEDURE — 83735 ASSAY OF MAGNESIUM: CPT | Mod: 91

## 2021-01-01 PROCEDURE — 84100 ASSAY OF PHOSPHORUS: CPT | Mod: 91

## 2021-01-01 PROCEDURE — 71045 X-RAY EXAM CHEST 1 VIEW: CPT | Mod: 26,76,, | Performed by: RADIOLOGY

## 2021-01-01 PROCEDURE — 31500 INSERT EMERGENCY AIRWAY: CPT

## 2021-01-01 PROCEDURE — P9612 CATHETERIZE FOR URINE SPEC: HCPCS

## 2021-01-01 PROCEDURE — 63600175 PHARM REV CODE 636 W HCPCS: Performed by: INTERNAL MEDICINE

## 2021-01-01 PROCEDURE — 99285 EMERGENCY DEPT VISIT HI MDM: CPT | Mod: 25

## 2021-01-01 PROCEDURE — 99238 PR HOSPITAL DISCHARGE DAY,<30 MIN: ICD-10-PCS | Mod: ,,, | Performed by: NURSE PRACTITIONER

## 2021-01-01 PROCEDURE — 81000 URINALYSIS NONAUTO W/SCOPE: CPT | Mod: 59 | Performed by: INTERNAL MEDICINE

## 2021-01-01 PROCEDURE — 85027 COMPLETE CBC AUTOMATED: CPT | Performed by: INTERNAL MEDICINE

## 2021-01-01 PROCEDURE — 83735 ASSAY OF MAGNESIUM: CPT | Mod: 91 | Performed by: PSYCHIATRY & NEUROLOGY

## 2021-01-01 PROCEDURE — 95714 VEEG EA 12-26 HR UNMNTR: CPT

## 2021-01-01 PROCEDURE — 96375 TX/PRO/DX INJ NEW DRUG ADDON: CPT

## 2021-01-01 RX ORDER — FAMOTIDINE 10 MG/ML
20 INJECTION INTRAVENOUS 2 TIMES DAILY
Status: DISCONTINUED | OUTPATIENT
Start: 2021-01-01 | End: 2021-01-01

## 2021-01-01 RX ORDER — MIDAZOLAM HYDROCHLORIDE 1 MG/ML
2 INJECTION, SOLUTION INTRAVENOUS CONTINUOUS
Status: DISCONTINUED | OUTPATIENT
Start: 2021-01-01 | End: 2021-01-01 | Stop reason: HOSPADM

## 2021-01-01 RX ORDER — METRONIDAZOLE 500 MG/100ML
500 INJECTION, SOLUTION INTRAVENOUS
Status: DISCONTINUED | OUTPATIENT
Start: 2021-01-01 | End: 2021-01-01

## 2021-01-01 RX ORDER — ZOLPIDEM TARTRATE 5 MG/1
5 TABLET ORAL NIGHTLY
COMMUNITY
Start: 2021-01-01 | End: 2021-01-01 | Stop reason: HOSPADM

## 2021-01-01 RX ORDER — SODIUM CHLORIDE 9 MG/ML
INJECTION, SOLUTION INTRAVENOUS CONTINUOUS
Status: DISCONTINUED | OUTPATIENT
Start: 2021-01-01 | End: 2021-01-01

## 2021-01-01 RX ORDER — NOREPINEPHRINE BITARTRATE/D5W 4MG/250ML
0-3 PLASTIC BAG, INJECTION (ML) INTRAVENOUS CONTINUOUS
Status: DISCONTINUED | OUTPATIENT
Start: 2021-01-01 | End: 2021-01-01

## 2021-01-01 RX ORDER — FENTANYL 25 UG/1
1 PATCH TRANSDERMAL
COMMUNITY
Start: 2021-01-01 | End: 2021-01-01 | Stop reason: HOSPADM

## 2021-01-01 RX ORDER — ATROPINE SULFATE 0.1 MG/ML
INJECTION INTRAVENOUS
Status: DISPENSED
Start: 2021-01-01 | End: 2021-01-01

## 2021-01-01 RX ORDER — INSULIN ASPART 100 [IU]/ML
3 INJECTION, SOLUTION INTRAVENOUS; SUBCUTANEOUS
Status: DISCONTINUED | OUTPATIENT
Start: 2021-01-01 | End: 2021-01-01

## 2021-01-01 RX ORDER — LEVETIRACETAM 5 MG/ML
500 INJECTION INTRAVASCULAR EVERY 12 HOURS
Status: DISCONTINUED | OUTPATIENT
Start: 2021-01-01 | End: 2021-01-01 | Stop reason: HOSPADM

## 2021-01-01 RX ORDER — MUPIROCIN 20 MG/G
OINTMENT TOPICAL 2 TIMES DAILY
Status: CANCELLED | OUTPATIENT
Start: 2021-01-01 | End: 2021-01-01

## 2021-01-01 RX ORDER — SUCCINYLCHOLINE CHLORIDE 20 MG/ML
100 INJECTION INTRAMUSCULAR; INTRAVENOUS
Status: COMPLETED | OUTPATIENT
Start: 2021-01-01 | End: 2021-01-01

## 2021-01-01 RX ORDER — NALOXONE HCL 0.4 MG/ML
VIAL (ML) INJECTION
Status: COMPLETED
Start: 2021-01-01 | End: 2021-01-01

## 2021-01-01 RX ORDER — OMEPRAZOLE 20 MG/1
20 CAPSULE, DELAYED RELEASE ORAL 2 TIMES DAILY
COMMUNITY
Start: 2021-01-01 | End: 2021-01-01 | Stop reason: HOSPADM

## 2021-01-01 RX ORDER — LANOLIN ALCOHOL/MO/W.PET/CERES
800 CREAM (GRAM) TOPICAL
Status: DISCONTINUED | OUTPATIENT
Start: 2021-01-01 | End: 2021-01-01

## 2021-01-01 RX ORDER — LIDOCAINE HYDROCHLORIDE 10 MG/ML
1 INJECTION INFILTRATION; PERINEURAL ONCE
Status: DISCONTINUED | OUTPATIENT
Start: 2021-01-01 | End: 2021-01-01

## 2021-01-01 RX ORDER — LEVETIRACETAM 10 MG/ML
INJECTION INTRAVASCULAR
Status: COMPLETED
Start: 2021-01-01 | End: 2021-01-01

## 2021-01-01 RX ORDER — PROPOFOL 10 MG/ML
INJECTION, EMULSION INTRAVENOUS
Status: DISCONTINUED
Start: 2021-01-01 | End: 2021-01-01 | Stop reason: HOSPADM

## 2021-01-01 RX ORDER — SODIUM CHLORIDE 9 MG/ML
INJECTION, SOLUTION INTRAVENOUS
Status: COMPLETED | OUTPATIENT
Start: 2021-01-01 | End: 2021-01-01

## 2021-01-01 RX ORDER — SODIUM BICARBONATE 1 MEQ/ML
150 SYRINGE (ML) INTRAVENOUS ONCE
Status: COMPLETED | OUTPATIENT
Start: 2021-01-01 | End: 2021-01-01

## 2021-01-01 RX ORDER — POLYETHYLENE GLYCOL 3350 17 G/17G
17 POWDER, FOR SOLUTION ORAL DAILY
Status: DISCONTINUED | OUTPATIENT
Start: 2021-01-01 | End: 2021-01-01

## 2021-01-01 RX ORDER — MIDAZOLAM HYDROCHLORIDE 1 MG/ML
2 INJECTION INTRAMUSCULAR; INTRAVENOUS
Status: COMPLETED | OUTPATIENT
Start: 2021-01-01 | End: 2021-01-01

## 2021-01-01 RX ORDER — SUCCINYLCHOLINE CHLORIDE 20 MG/ML
INJECTION INTRAMUSCULAR; INTRAVENOUS
Status: DISCONTINUED
Start: 2021-01-01 | End: 2021-01-01 | Stop reason: HOSPADM

## 2021-01-01 RX ORDER — PHENYLEPHRINE HYDROCHLORIDE 10 MG/ML
INJECTION INTRAVENOUS
Status: DISPENSED
Start: 2021-01-01 | End: 2021-01-01

## 2021-01-01 RX ORDER — APIXABAN 5 MG/1
5 TABLET, FILM COATED ORAL 2 TIMES DAILY
COMMUNITY
Start: 2021-01-01 | End: 2021-01-01 | Stop reason: HOSPADM

## 2021-01-01 RX ORDER — LEVETIRACETAM 5 MG/ML
INJECTION INTRAVASCULAR
Status: COMPLETED
Start: 2021-01-01 | End: 2021-01-01

## 2021-01-01 RX ORDER — SODIUM,POTASSIUM PHOSPHATES 280-250MG
2 POWDER IN PACKET (EA) ORAL
Status: DISCONTINUED | OUTPATIENT
Start: 2021-01-01 | End: 2021-01-01

## 2021-01-01 RX ORDER — INSULIN ASPART 100 [IU]/ML
1-10 INJECTION, SOLUTION INTRAVENOUS; SUBCUTANEOUS EVERY 4 HOURS PRN
Status: DISCONTINUED | OUTPATIENT
Start: 2021-01-01 | End: 2021-01-01

## 2021-01-01 RX ORDER — AMLODIPINE BESYLATE 5 MG/1
5 TABLET ORAL DAILY
COMMUNITY
Start: 2021-01-01 | End: 2021-01-01 | Stop reason: HOSPADM

## 2021-01-01 RX ORDER — BALSAM PERU/CASTOR OIL
OINTMENT (GRAM) TOPICAL 2 TIMES DAILY
Status: DISCONTINUED | OUTPATIENT
Start: 2021-01-01 | End: 2021-01-01 | Stop reason: HOSPADM

## 2021-01-01 RX ORDER — GLUCAGON 1 MG
1 KIT INJECTION
Status: DISCONTINUED | OUTPATIENT
Start: 2021-01-01 | End: 2021-01-01

## 2021-01-01 RX ORDER — PHENYLEPHRINE HCL IN 0.9% NACL 1 MG/10 ML
SYRINGE (ML) INTRAVENOUS
Status: COMPLETED
Start: 2021-01-01 | End: 2021-01-01

## 2021-01-01 RX ORDER — AMOXICILLIN 250 MG
1 CAPSULE ORAL 2 TIMES DAILY
Status: DISCONTINUED | OUTPATIENT
Start: 2021-01-01 | End: 2021-01-01

## 2021-01-01 RX ORDER — MIDAZOLAM HYDROCHLORIDE 1 MG/ML
INJECTION INTRAMUSCULAR; INTRAVENOUS
Status: DISCONTINUED
Start: 2021-01-01 | End: 2021-01-01 | Stop reason: HOSPADM

## 2021-01-01 RX ORDER — HYDROCODONE BITARTRATE AND ACETAMINOPHEN 500; 5 MG/1; MG/1
TABLET ORAL
Status: DISCONTINUED | OUTPATIENT
Start: 2021-01-01 | End: 2021-01-01

## 2021-01-01 RX ORDER — LORAZEPAM 2 MG/ML
1 INJECTION INTRAMUSCULAR
Status: DISCONTINUED | OUTPATIENT
Start: 2021-01-01 | End: 2021-01-01

## 2021-01-01 RX ORDER — METOPROLOL SUCCINATE 50 MG/1
50 TABLET, EXTENDED RELEASE ORAL DAILY
COMMUNITY
Start: 2021-01-01 | End: 2021-01-01 | Stop reason: HOSPADM

## 2021-01-01 RX ORDER — PHENYLEPHRINE HCL IN 0.9% NACL 1 MG/10 ML
SYRINGE (ML) INTRAVENOUS
Status: DISCONTINUED
Start: 2021-01-01 | End: 2021-01-01 | Stop reason: HOSPADM

## 2021-01-01 RX ORDER — INSULIN ASPART 100 [IU]/ML
1-10 INJECTION, SOLUTION INTRAVENOUS; SUBCUTANEOUS EVERY 6 HOURS PRN
Status: DISCONTINUED | OUTPATIENT
Start: 2021-01-01 | End: 2021-01-01

## 2021-01-01 RX ORDER — FENTANYL CITRATE 50 UG/ML
50 INJECTION, SOLUTION INTRAMUSCULAR; INTRAVENOUS ONCE
Status: COMPLETED | OUTPATIENT
Start: 2021-01-01 | End: 2021-01-01

## 2021-01-01 RX ORDER — SODIUM CHLORIDE 9 MG/ML
INJECTION, SOLUTION INTRAVENOUS CONTINUOUS
Status: DISCONTINUED | OUTPATIENT
Start: 2021-01-01 | End: 2021-01-01 | Stop reason: HOSPADM

## 2021-01-01 RX ORDER — MORPHINE SULFATE IN 0.9 % NACL 30 MG/30ML
0-10 PATIENT CONTROLLED ANALGESIA SYRINGE INTRAVENOUS CONTINUOUS
Status: DISCONTINUED | OUTPATIENT
Start: 2021-01-01 | End: 2021-01-01 | Stop reason: HOSPADM

## 2021-01-01 RX ORDER — SODIUM BICARBONATE 1 MEQ/ML
100 SYRINGE (ML) INTRAVENOUS ONCE
Status: COMPLETED | OUTPATIENT
Start: 2021-01-01 | End: 2021-01-01

## 2021-01-01 RX ORDER — ATORVASTATIN CALCIUM 20 MG/1
40 TABLET, FILM COATED ORAL DAILY
Status: DISCONTINUED | OUTPATIENT
Start: 2021-01-01 | End: 2021-01-01

## 2021-01-01 RX ORDER — MAGNESIUM SULFATE HEPTAHYDRATE 40 MG/ML
2 INJECTION, SOLUTION INTRAVENOUS ONCE
Status: COMPLETED | OUTPATIENT
Start: 2021-01-01 | End: 2021-01-01

## 2021-01-01 RX ORDER — SODIUM CHLORIDE 0.9 % (FLUSH) 0.9 %
10 SYRINGE (ML) INJECTION
Status: DISCONTINUED | OUTPATIENT
Start: 2021-01-01 | End: 2021-01-01

## 2021-01-01 RX ORDER — ACETAMINOPHEN 325 MG/1
650 TABLET ORAL EVERY 4 HOURS PRN
Status: DISCONTINUED | OUTPATIENT
Start: 2021-01-01 | End: 2021-01-01

## 2021-01-01 RX ORDER — GLYCOPYRROLATE 1 MG/5ML
1 SOLUTION ORAL 3 TIMES DAILY PRN
Status: DISCONTINUED | OUTPATIENT
Start: 2021-01-01 | End: 2021-01-01

## 2021-01-01 RX ORDER — IPRATROPIUM BROMIDE AND ALBUTEROL SULFATE 2.5; .5 MG/3ML; MG/3ML
3 SOLUTION RESPIRATORY (INHALATION) EVERY 6 HOURS PRN
Status: DISCONTINUED | OUTPATIENT
Start: 2021-01-01 | End: 2021-01-01

## 2021-01-01 RX ORDER — OXYCODONE HYDROCHLORIDE 10 MG/1
10 TABLET ORAL EVERY 4 HOURS PRN
COMMUNITY
Start: 2021-01-01 | End: 2021-01-01 | Stop reason: HOSPADM

## 2021-01-01 RX ORDER — NOREPINEPHRINE BITARTRATE/D5W 4MG/250ML
0.05 PLASTIC BAG, INJECTION (ML) INTRAVENOUS CONTINUOUS
Status: DISCONTINUED | OUTPATIENT
Start: 2021-01-01 | End: 2021-01-01 | Stop reason: HOSPADM

## 2021-01-01 RX ORDER — MUPIROCIN 20 MG/G
OINTMENT TOPICAL 2 TIMES DAILY
Status: DISCONTINUED | OUTPATIENT
Start: 2021-01-01 | End: 2021-01-01

## 2021-01-01 RX ORDER — ONDANSETRON 4 MG/1
4 TABLET, ORALLY DISINTEGRATING ORAL
Status: COMPLETED | OUTPATIENT
Start: 2021-01-01 | End: 2021-01-01

## 2021-01-01 RX ORDER — INSULIN ASPART 100 [IU]/ML
5 INJECTION, SOLUTION INTRAVENOUS; SUBCUTANEOUS
Status: DISCONTINUED | OUTPATIENT
Start: 2021-01-01 | End: 2021-01-01

## 2021-01-01 RX ORDER — FENTANYL CITRATE 50 UG/ML
75 INJECTION, SOLUTION INTRAMUSCULAR; INTRAVENOUS ONCE
Status: COMPLETED | OUTPATIENT
Start: 2021-01-01 | End: 2021-01-01

## 2021-01-01 RX ORDER — HYDROCODONE BITARTRATE AND ACETAMINOPHEN 5; 325 MG/1; MG/1
1 TABLET ORAL
Status: COMPLETED | OUTPATIENT
Start: 2021-01-01 | End: 2021-01-01

## 2021-01-01 RX ORDER — GADOBUTROL 604.72 MG/ML
9 INJECTION INTRAVENOUS
Status: COMPLETED | OUTPATIENT
Start: 2021-01-01 | End: 2021-01-01

## 2021-01-01 RX ORDER — FENTANYL CITRATE 50 UG/ML
INJECTION, SOLUTION INTRAMUSCULAR; INTRAVENOUS
Status: COMPLETED
Start: 2021-01-01 | End: 2021-01-01

## 2021-01-01 RX ORDER — DEXAMETHASONE SODIUM PHOSPHATE 4 MG/ML
2 INJECTION, SOLUTION INTRA-ARTICULAR; INTRALESIONAL; INTRAMUSCULAR; INTRAVENOUS; SOFT TISSUE DAILY
Status: DISCONTINUED | OUTPATIENT
Start: 2021-01-01 | End: 2021-01-01

## 2021-01-01 RX ORDER — SODIUM BICARBONATE 1 MEQ/ML
SYRINGE (ML) INTRAVENOUS
Status: COMPLETED
Start: 2021-01-01 | End: 2021-01-01

## 2021-01-01 RX ORDER — LEVETIRACETAM 10 MG/ML
INJECTION INTRAVASCULAR
Status: DISCONTINUED
Start: 2021-01-01 | End: 2021-01-01 | Stop reason: WASHOUT

## 2021-01-01 RX ORDER — MIDAZOLAM HYDROCHLORIDE 1 MG/ML
INJECTION, SOLUTION INTRAVENOUS
Status: DISCONTINUED
Start: 2021-01-01 | End: 2021-01-01 | Stop reason: HOSPADM

## 2021-01-01 RX ORDER — SODIUM CHLORIDE, SODIUM LACTATE, POTASSIUM CHLORIDE, CALCIUM CHLORIDE 600; 310; 30; 20 MG/100ML; MG/100ML; MG/100ML; MG/100ML
INJECTION, SOLUTION INTRAVENOUS CONTINUOUS
Status: DISCONTINUED | OUTPATIENT
Start: 2021-01-01 | End: 2021-01-01

## 2021-01-01 RX ORDER — LORAZEPAM 2 MG/ML
1 INJECTION INTRAMUSCULAR
Status: DISCONTINUED | OUTPATIENT
Start: 2021-01-01 | End: 2021-01-01 | Stop reason: HOSPADM

## 2021-01-01 RX ORDER — ONDANSETRON 2 MG/ML
8 INJECTION INTRAMUSCULAR; INTRAVENOUS EVERY 8 HOURS PRN
Status: DISCONTINUED | OUTPATIENT
Start: 2021-01-01 | End: 2021-01-01 | Stop reason: HOSPADM

## 2021-01-01 RX ORDER — ATROPINE SULFATE 10 MG/ML
2 SOLUTION/ DROPS OPHTHALMIC EVERY 4 HOURS PRN
Status: DISCONTINUED | OUTPATIENT
Start: 2021-01-01 | End: 2021-01-01 | Stop reason: HOSPADM

## 2021-01-01 RX ORDER — CEFEPIME HYDROCHLORIDE 2 G/1
2 INJECTION, POWDER, FOR SOLUTION INTRAVENOUS
Status: DISCONTINUED | OUTPATIENT
Start: 2021-01-01 | End: 2021-01-01

## 2021-01-01 RX ORDER — INDOMETHACIN 25 MG/1
CAPSULE ORAL
Status: DISCONTINUED
Start: 2021-01-01 | End: 2021-01-01 | Stop reason: WASHOUT

## 2021-01-01 RX ADMIN — LORAZEPAM 1 MG: 2 INJECTION INTRAMUSCULAR; INTRAVENOUS at 10:10

## 2021-01-01 RX ADMIN — POTASSIUM BICARBONATE 60 MEQ: 391 TABLET, EFFERVESCENT ORAL at 08:10

## 2021-01-01 RX ADMIN — MUPIROCIN: 20 OINTMENT TOPICAL at 09:10

## 2021-01-01 RX ADMIN — HYDROCORTISONE SODIUM SUCCINATE 50 MG: 100 INJECTION, POWDER, FOR SOLUTION INTRAMUSCULAR; INTRAVENOUS at 12:10

## 2021-01-01 RX ADMIN — POTASSIUM & SODIUM PHOSPHATES POWDER PACK 280-160-250 MG 2 PACKET: 280-160-250 PACK at 10:10

## 2021-01-01 RX ADMIN — MUPIROCIN: 20 OINTMENT TOPICAL at 08:10

## 2021-01-01 RX ADMIN — CEFEPIME 2 G: 2 INJECTION, POWDER, FOR SOLUTION INTRAVENOUS at 12:10

## 2021-01-01 RX ADMIN — ACYCLOVIR SODIUM 520 MG: 50 INJECTION, SOLUTION INTRAVENOUS at 12:10

## 2021-01-01 RX ADMIN — HYDROCORTISONE SODIUM SUCCINATE 50 MG: 100 INJECTION, POWDER, FOR SOLUTION INTRAMUSCULAR; INTRAVENOUS at 05:10

## 2021-01-01 RX ADMIN — CEFTRIAXONE 2 G: 2 INJECTION, POWDER, FOR SOLUTION INTRAMUSCULAR; INTRAVENOUS at 03:10

## 2021-01-01 RX ADMIN — FAMOTIDINE 20 MG: 10 INJECTION, SOLUTION INTRAVENOUS at 09:10

## 2021-01-01 RX ADMIN — INSULIN ASPART 3 UNITS: 100 INJECTION, SOLUTION INTRAVENOUS; SUBCUTANEOUS at 12:10

## 2021-01-01 RX ADMIN — INSULIN ASPART 6 UNITS: 100 INJECTION, SOLUTION INTRAVENOUS; SUBCUTANEOUS at 09:10

## 2021-01-01 RX ADMIN — ACYCLOVIR SODIUM 520 MG: 50 INJECTION, SOLUTION INTRAVENOUS at 03:10

## 2021-01-01 RX ADMIN — Medication: at 08:10

## 2021-01-01 RX ADMIN — FAMOTIDINE 20 MG: 10 INJECTION, SOLUTION INTRAVENOUS at 08:10

## 2021-01-01 RX ADMIN — LORAZEPAM 1 MG: 2 INJECTION INTRAMUSCULAR; INTRAVENOUS at 02:10

## 2021-01-01 RX ADMIN — NOREPINEPHRINE BITARTRATE 0.75 MCG/KG/MIN: 1 INJECTION, SOLUTION, CONCENTRATE INTRAVENOUS at 06:10

## 2021-01-01 RX ADMIN — INSULIN ASPART 6 UNITS: 100 INJECTION, SOLUTION INTRAVENOUS; SUBCUTANEOUS at 04:10

## 2021-01-01 RX ADMIN — INSULIN ASPART 5 UNITS: 100 INJECTION, SOLUTION INTRAVENOUS; SUBCUTANEOUS at 12:10

## 2021-01-01 RX ADMIN — FENTANYL CITRATE 75 MCG: 50 INJECTION INTRAMUSCULAR; INTRAVENOUS at 03:10

## 2021-01-01 RX ADMIN — METRONIDAZOLE 500 MG: 500 INJECTION, SOLUTION INTRAVENOUS at 03:10

## 2021-01-01 RX ADMIN — SODIUM CHLORIDE: 0.9 INJECTION, SOLUTION INTRAVENOUS at 01:10

## 2021-01-01 RX ADMIN — Medication 800 MG: at 05:10

## 2021-01-01 RX ADMIN — POTASSIUM & SODIUM PHOSPHATES POWDER PACK 280-160-250 MG 2 PACKET: 280-160-250 PACK at 01:10

## 2021-01-01 RX ADMIN — VASOPRESSIN 0.04 UNITS/MIN: 20 INJECTION INTRAVENOUS at 01:10

## 2021-01-01 RX ADMIN — DOCUSATE SODIUM 50MG AND SENNOSIDES 8.6MG 1 TABLET: 8.6; 5 TABLET, FILM COATED ORAL at 08:10

## 2021-01-01 RX ADMIN — LORAZEPAM 1 MG: 2 INJECTION INTRAMUSCULAR; INTRAVENOUS at 08:10

## 2021-01-01 RX ADMIN — Medication: at 12:10

## 2021-01-01 RX ADMIN — LEVETIRACETAM 500 MG: 500 INJECTION, SOLUTION INTRAVENOUS at 02:10

## 2021-01-01 RX ADMIN — VANCOMYCIN HYDROCHLORIDE 1500 MG: 1.5 INJECTION, POWDER, LYOPHILIZED, FOR SOLUTION INTRAVENOUS at 12:10

## 2021-01-01 RX ADMIN — Medication: at 09:10

## 2021-01-01 RX ADMIN — SODIUM BICARBONATE 150 MEQ: 84 INJECTION INTRAVENOUS at 12:10

## 2021-01-01 RX ADMIN — POTASSIUM & SODIUM PHOSPHATES POWDER PACK 280-160-250 MG 2 PACKET: 280-160-250 PACK at 02:10

## 2021-01-01 RX ADMIN — Medication 800 MG: at 06:10

## 2021-01-01 RX ADMIN — METRONIDAZOLE 500 MG: 500 INJECTION, SOLUTION INTRAVENOUS at 12:10

## 2021-01-01 RX ADMIN — INSULIN ASPART 3 UNITS: 100 INJECTION, SOLUTION INTRAVENOUS; SUBCUTANEOUS at 09:10

## 2021-01-01 RX ADMIN — VASOPRESSIN 0.04 UNITS/MIN: 20 INJECTION INTRAVENOUS at 06:10

## 2021-01-01 RX ADMIN — VANCOMYCIN HYDROCHLORIDE 1500 MG: 1.5 INJECTION, POWDER, LYOPHILIZED, FOR SOLUTION INTRAVENOUS at 01:10

## 2021-01-01 RX ADMIN — INSULIN ASPART 6 UNITS: 100 INJECTION, SOLUTION INTRAVENOUS; SUBCUTANEOUS at 12:10

## 2021-01-01 RX ADMIN — SODIUM CHLORIDE, SODIUM LACTATE, POTASSIUM CHLORIDE, AND CALCIUM CHLORIDE 1000 ML: .6; .31; .03; .02 INJECTION, SOLUTION INTRAVENOUS at 03:10

## 2021-01-01 RX ADMIN — INSULIN ASPART 3 UNITS: 100 INJECTION, SOLUTION INTRAVENOUS; SUBCUTANEOUS at 08:10

## 2021-01-01 RX ADMIN — LORAZEPAM 1 MG: 2 INJECTION INTRAMUSCULAR; INTRAVENOUS at 12:10

## 2021-01-01 RX ADMIN — SODIUM CHLORIDE: 0.9 INJECTION, SOLUTION INTRAVENOUS at 09:10

## 2021-01-01 RX ADMIN — ACETAMINOPHEN 650 MG: 325 TABLET ORAL at 10:10

## 2021-01-01 RX ADMIN — INSULIN ASPART 6 UNITS: 100 INJECTION, SOLUTION INTRAVENOUS; SUBCUTANEOUS at 08:10

## 2021-01-01 RX ADMIN — INSULIN ASPART 6 UNITS: 100 INJECTION, SOLUTION INTRAVENOUS; SUBCUTANEOUS at 03:10

## 2021-01-01 RX ADMIN — CEFEPIME 2 G: 2 INJECTION, POWDER, FOR SOLUTION INTRAVENOUS at 03:10

## 2021-01-01 RX ADMIN — VASOPRESSIN 0.04 UNITS/MIN: 20 INJECTION INTRAVENOUS at 12:10

## 2021-01-01 RX ADMIN — INSULIN ASPART 2 UNITS: 100 INJECTION, SOLUTION INTRAVENOUS; SUBCUTANEOUS at 12:10

## 2021-01-01 RX ADMIN — LEVETIRACETAM 500 MG: 5 INJECTION INTRAVENOUS at 08:10

## 2021-01-01 RX ADMIN — INSULIN ASPART 8 UNITS: 100 INJECTION, SOLUTION INTRAVENOUS; SUBCUTANEOUS at 09:10

## 2021-01-01 RX ADMIN — POTASSIUM BICARBONATE 60 MEQ: 391 TABLET, EFFERVESCENT ORAL at 05:10

## 2021-01-01 RX ADMIN — Medication 800 MG: at 10:10

## 2021-01-01 RX ADMIN — CEFEPIME 2 G: 2 INJECTION, POWDER, FOR SOLUTION INTRAVENOUS at 10:10

## 2021-01-01 RX ADMIN — POLYETHYLENE GLYCOL 3350 17 G: 17 POWDER, FOR SOLUTION ORAL at 08:10

## 2021-01-01 RX ADMIN — Medication 800 MG: at 08:10

## 2021-01-01 RX ADMIN — METRONIDAZOLE 500 MG: 500 INJECTION, SOLUTION INTRAVENOUS at 08:10

## 2021-01-01 RX ADMIN — INSULIN ASPART 6 UNITS: 100 INJECTION, SOLUTION INTRAVENOUS; SUBCUTANEOUS at 11:10

## 2021-01-01 RX ADMIN — Medication 800 MG: at 09:10

## 2021-01-01 RX ADMIN — HYDROCODONE BITARTRATE AND ACETAMINOPHEN 1 TABLET: 5; 325 TABLET ORAL at 06:09

## 2021-01-01 RX ADMIN — CEFEPIME 2 G: 2 INJECTION, POWDER, FOR SOLUTION INTRAVENOUS at 08:10

## 2021-01-01 RX ADMIN — IOHEXOL 100 ML: 350 INJECTION, SOLUTION INTRAVENOUS at 09:10

## 2021-01-01 RX ADMIN — INSULIN ASPART 5 UNITS: 100 INJECTION, SOLUTION INTRAVENOUS; SUBCUTANEOUS at 07:10

## 2021-01-01 RX ADMIN — LEVETIRACETAM 500 MG: 5 INJECTION INTRAVENOUS at 09:10

## 2021-01-01 RX ADMIN — METRONIDAZOLE 500 MG: 500 INJECTION, SOLUTION INTRAVENOUS at 02:10

## 2021-01-01 RX ADMIN — ACYCLOVIR SODIUM 520 MG: 50 INJECTION, SOLUTION INTRAVENOUS at 11:10

## 2021-01-01 RX ADMIN — NOREPINEPHRINE BITARTRATE 1.5 MCG/KG/MIN: 1 INJECTION, SOLUTION, CONCENTRATE INTRAVENOUS at 10:10

## 2021-01-01 RX ADMIN — SODIUM CHLORIDE: 0.9 INJECTION, SOLUTION INTRAVENOUS at 12:10

## 2021-01-01 RX ADMIN — POTASSIUM BICARBONATE 50 MEQ: 978 TABLET, EFFERVESCENT ORAL at 06:10

## 2021-01-01 RX ADMIN — CEFTRIAXONE 2 G: 2 INJECTION, POWDER, FOR SOLUTION INTRAMUSCULAR; INTRAVENOUS at 12:10

## 2021-01-01 RX ADMIN — ACYCLOVIR SODIUM 520 MG: 50 INJECTION, SOLUTION INTRAVENOUS at 07:10

## 2021-01-01 RX ADMIN — LORAZEPAM 1 MG: 2 INJECTION INTRAMUSCULAR; INTRAVENOUS at 01:10

## 2021-01-01 RX ADMIN — FAMOTIDINE 20 MG: 10 INJECTION, SOLUTION INTRAVENOUS at 12:10

## 2021-01-01 RX ADMIN — PSYLLIUM HUSK 1 PACKET: 3.4 POWDER ORAL at 09:10

## 2021-01-01 RX ADMIN — INSULIN ASPART 3 UNITS: 100 INJECTION, SOLUTION INTRAVENOUS; SUBCUTANEOUS at 03:10

## 2021-01-01 RX ADMIN — INSULIN ASPART 3 UNITS: 100 INJECTION, SOLUTION INTRAVENOUS; SUBCUTANEOUS at 04:10

## 2021-01-01 RX ADMIN — INSULIN ASPART 10 UNITS: 100 INJECTION, SOLUTION INTRAVENOUS; SUBCUTANEOUS at 04:10

## 2021-01-01 RX ADMIN — METRONIDAZOLE 500 MG: 500 INJECTION, SOLUTION INTRAVENOUS at 11:10

## 2021-01-01 RX ADMIN — POTASSIUM BICARBONATE 35 MEQ: 391 TABLET, EFFERVESCENT ORAL at 05:10

## 2021-01-01 RX ADMIN — ACYCLOVIR SODIUM 520 MG: 50 INJECTION, SOLUTION INTRAVENOUS at 01:10

## 2021-01-01 RX ADMIN — VASOPRESSIN 0.04 UNITS/MIN: 20 INJECTION INTRAVENOUS at 04:10

## 2021-01-01 RX ADMIN — Medication 10 MG/HR: at 09:10

## 2021-01-01 RX ADMIN — CEFEPIME 2 G: 2 INJECTION, POWDER, FOR SOLUTION INTRAVENOUS at 07:10

## 2021-01-01 RX ADMIN — GADOBUTROL 9 ML: 604.72 INJECTION INTRAVENOUS at 11:10

## 2021-01-01 RX ADMIN — MIDAZOLAM HYDROCHLORIDE 2 MG: 1 INJECTION, SOLUTION INTRAMUSCULAR; INTRAVENOUS at 01:10

## 2021-01-01 RX ADMIN — DEXAMETHASONE SODIUM PHOSPHATE 2 MG: 4 INJECTION INTRA-ARTICULAR; INTRALESIONAL; INTRAMUSCULAR; INTRAVENOUS; SOFT TISSUE at 08:10

## 2021-01-01 RX ADMIN — LEVETIRACETAM 500 MG: 5 INJECTION INTRAVENOUS at 01:10

## 2021-01-01 RX ADMIN — LORAZEPAM 1 MG: 2 INJECTION INTRAMUSCULAR; INTRAVENOUS at 09:10

## 2021-01-01 RX ADMIN — Medication 0.05 MCG/KG/MIN: at 04:10

## 2021-01-01 RX ADMIN — FENTANYL CITRATE 50 MCG: 50 INJECTION, SOLUTION INTRAMUSCULAR; INTRAVENOUS at 01:10

## 2021-01-01 RX ADMIN — ATORVASTATIN CALCIUM 40 MG: 20 TABLET, FILM COATED ORAL at 10:10

## 2021-01-01 RX ADMIN — LEVETIRACETAM INJECTION 1000 MG: 10 INJECTION INTRAVENOUS at 02:10

## 2021-01-01 RX ADMIN — POTASSIUM BICARBONATE 35 MEQ: 391 TABLET, EFFERVESCENT ORAL at 07:10

## 2021-01-01 RX ADMIN — ACETAMINOPHEN 650 MG: 325 TABLET ORAL at 03:10

## 2021-01-01 RX ADMIN — SUCCINYLCHOLINE CHLORIDE 100 MG: 20 INJECTION, SOLUTION INTRAMUSCULAR; INTRAVENOUS; PARENTERAL at 01:10

## 2021-01-01 RX ADMIN — MUPIROCIN: 20 OINTMENT TOPICAL at 12:10

## 2021-01-01 RX ADMIN — Medication 2 MG/HR: at 02:10

## 2021-01-01 RX ADMIN — METRONIDAZOLE 500 MG: 500 INJECTION, SOLUTION INTRAVENOUS at 07:10

## 2021-01-01 RX ADMIN — NOREPINEPHRINE BITARTRATE 0.4 MCG/KG/MIN: 1 INJECTION, SOLUTION, CONCENTRATE INTRAVENOUS at 07:10

## 2021-01-01 RX ADMIN — FENTANYL CITRATE 50 MCG: 50 INJECTION INTRAMUSCULAR; INTRAVENOUS at 01:10

## 2021-01-01 RX ADMIN — SODIUM CHLORIDE 1000 ML: 0.9 INJECTION, SOLUTION INTRAVENOUS at 02:10

## 2021-01-01 RX ADMIN — HYDROCORTISONE SODIUM SUCCINATE 50 MG: 100 INJECTION, POWDER, FOR SOLUTION INTRAMUSCULAR; INTRAVENOUS at 11:10

## 2021-01-01 RX ADMIN — ONDANSETRON 4 MG: 4 TABLET, ORALLY DISINTEGRATING ORAL at 06:09

## 2021-01-01 RX ADMIN — NALOXONE HYDROCHLORIDE 0.4 MG: 0.4 INJECTION, SOLUTION INTRAMUSCULAR; INTRAVENOUS; SUBCUTANEOUS at 01:10

## 2021-01-01 RX ADMIN — INSULIN ASPART 5 UNITS: 100 INJECTION, SOLUTION INTRAVENOUS; SUBCUTANEOUS at 04:10

## 2021-01-01 RX ADMIN — ONDANSETRON 8 MG: 2 INJECTION INTRAMUSCULAR; INTRAVENOUS at 10:10

## 2021-01-01 RX ADMIN — METRONIDAZOLE 500 MG: 500 INJECTION, SOLUTION INTRAVENOUS at 04:10

## 2021-01-01 RX ADMIN — ATORVASTATIN CALCIUM 40 MG: 20 TABLET, FILM COATED ORAL at 09:10

## 2021-01-01 RX ADMIN — INSULIN ASPART 4 UNITS: 100 INJECTION, SOLUTION INTRAVENOUS; SUBCUTANEOUS at 05:10

## 2021-01-01 RX ADMIN — ACYCLOVIR SODIUM 520 MG: 50 INJECTION, SOLUTION INTRAVENOUS at 08:10

## 2021-01-01 RX ADMIN — Medication 9.5 MG/HR: at 01:10

## 2021-01-01 RX ADMIN — VASOPRESSIN 0.04 UNITS/MIN: 20 INJECTION INTRAVENOUS at 09:10

## 2021-01-01 RX ADMIN — DOCUSATE SODIUM 50MG AND SENNOSIDES 8.6MG 1 TABLET: 8.6; 5 TABLET, FILM COATED ORAL at 10:10

## 2021-01-01 RX ADMIN — INSULIN ASPART 6 UNITS: 100 INJECTION, SOLUTION INTRAVENOUS; SUBCUTANEOUS at 06:10

## 2021-01-01 RX ADMIN — NOREPINEPHRINE BITARTRATE 0.02 MCG/KG/MIN: 1 INJECTION, SOLUTION, CONCENTRATE INTRAVENOUS at 09:10

## 2021-01-01 RX ADMIN — POTASSIUM & SODIUM PHOSPHATES POWDER PACK 280-160-250 MG 2 PACKET: 280-160-250 PACK at 06:10

## 2021-01-01 RX ADMIN — INSULIN ASPART 8 UNITS: 100 INJECTION, SOLUTION INTRAVENOUS; SUBCUTANEOUS at 07:10

## 2021-01-01 RX ADMIN — Medication 800 MG: at 04:10

## 2021-01-01 RX ADMIN — METRONIDAZOLE 500 MG: 500 INJECTION, SOLUTION INTRAVENOUS at 05:10

## 2021-01-01 RX ADMIN — VANCOMYCIN HYDROCHLORIDE 500 MG: 500 INJECTION, POWDER, LYOPHILIZED, FOR SOLUTION INTRAVENOUS at 04:10

## 2021-01-01 RX ADMIN — VASOPRESSIN 0.04 UNITS/MIN: 20 INJECTION INTRAVENOUS at 07:10

## 2021-01-01 RX ADMIN — SODIUM CHLORIDE, SODIUM LACTATE, POTASSIUM CHLORIDE, AND CALCIUM CHLORIDE: 600; 310; 30; 20 INJECTION, SOLUTION INTRAVENOUS at 07:10

## 2021-01-01 RX ADMIN — SODIUM BICARBONATE 100 MEQ: 84 INJECTION, SOLUTION INTRAVENOUS at 04:10

## 2021-01-01 RX ADMIN — INSULIN ASPART 3 UNITS: 100 INJECTION, SOLUTION INTRAVENOUS; SUBCUTANEOUS at 11:10

## 2021-01-01 RX ADMIN — MAGNESIUM SULFATE 2 G: 2 INJECTION INTRAVENOUS at 06:10

## 2021-01-01 RX ADMIN — POTASSIUM & SODIUM PHOSPHATES POWDER PACK 280-160-250 MG 2 PACKET: 280-160-250 PACK at 05:10

## 2021-01-01 RX ADMIN — ACYCLOVIR SODIUM 520 MG: 50 INJECTION, SOLUTION INTRAVENOUS at 06:10

## 2021-01-01 RX ADMIN — ATROPINE SULFATE 2 DROP: 10 SOLUTION OPHTHALMIC at 11:10

## 2021-01-01 RX ADMIN — SODIUM CHLORIDE, SODIUM LACTATE, POTASSIUM CHLORIDE, AND CALCIUM CHLORIDE: 600; 310; 30; 20 INJECTION, SOLUTION INTRAVENOUS at 05:10

## 2021-01-01 RX ADMIN — DEXAMETHASONE SODIUM PHOSPHATE 2 MG: 4 INJECTION INTRA-ARTICULAR; INTRALESIONAL; INTRAMUSCULAR; INTRAVENOUS; SOFT TISSUE at 01:10

## 2021-01-01 RX ADMIN — ATORVASTATIN CALCIUM 40 MG: 20 TABLET, FILM COATED ORAL at 08:10

## 2021-01-01 RX ADMIN — VASOPRESSIN 0.04 UNITS/MIN: 20 INJECTION INTRAVENOUS at 03:10

## 2021-01-01 RX ADMIN — LORAZEPAM 1 MG: 2 INJECTION INTRAMUSCULAR; INTRAVENOUS at 04:10

## 2021-01-01 RX ADMIN — Medication 1 MG/HR: at 10:10

## 2021-01-01 RX ADMIN — SODIUM CHLORIDE: 0.9 INJECTION, SOLUTION INTRAVENOUS at 02:10

## 2021-01-01 RX ADMIN — LORAZEPAM 1 MG: 2 INJECTION INTRAMUSCULAR; INTRAVENOUS at 11:10

## 2021-10-03 PROBLEM — G40.901 STATUS EPILEPTICUS: Status: ACTIVE | Noted: 2021-01-01

## 2021-10-03 PROBLEM — R41.82 ALTERED MENTAL STATUS: Status: ACTIVE | Noted: 2021-01-01

## 2021-10-04 PROBLEM — E87.6 HYPOKALEMIA: Status: ACTIVE | Noted: 2021-01-01

## 2021-10-04 PROBLEM — R29.818: Status: ACTIVE | Noted: 2021-01-01

## 2021-10-04 PROBLEM — G93.40 ACUTE ENCEPHALOPATHY: Status: ACTIVE | Noted: 2021-01-01

## 2021-10-04 PROBLEM — J96.01 ACUTE RESPIRATORY FAILURE WITH HYPOXIA: Status: ACTIVE | Noted: 2021-01-01

## 2021-10-04 PROBLEM — E87.29 METABOLIC ACIDOSIS, INCREASED ANION GAP (IAG): Status: ACTIVE | Noted: 2021-01-01

## 2021-10-04 PROBLEM — E88.09 HYPOALBUMINEMIA: Status: ACTIVE | Noted: 2021-01-01

## 2021-10-04 PROBLEM — E83.42 HYPOMAGNESEMIA: Status: ACTIVE | Noted: 2021-01-01

## 2021-10-04 PROBLEM — E83.51 HYPOCALCEMIA: Status: ACTIVE | Noted: 2021-01-01

## 2021-10-04 PROBLEM — A41.9 SEPTIC SHOCK: Status: ACTIVE | Noted: 2021-01-01

## 2021-10-04 PROBLEM — G93.5 BRAIN COMPRESSION: Status: ACTIVE | Noted: 2021-01-01

## 2021-10-04 PROBLEM — D69.6 THROMBOCYTOPENIA: Status: ACTIVE | Noted: 2021-01-01

## 2021-10-04 PROBLEM — C79.31 MELANOMA METASTATIC TO BRAIN: Status: ACTIVE | Noted: 2021-01-01

## 2021-10-04 PROBLEM — R65.21 SEPTIC SHOCK: Status: ACTIVE | Noted: 2021-01-01

## 2021-10-05 PROBLEM — I50.21 ACUTE SYSTOLIC CONGESTIVE HEART FAILURE: Status: ACTIVE | Noted: 2021-01-01

## 2021-10-05 PROBLEM — I63.531 STROKE DUE TO OCCLUSION OF RIGHT POSTERIOR CEREBRAL ARTERY: Status: ACTIVE | Noted: 2021-01-01

## 2021-10-05 PROBLEM — Z51.5 ENCOUNTER FOR PALLIATIVE CARE: Status: ACTIVE | Noted: 2021-01-01

## 2021-10-05 PROBLEM — Z71.89 COUNSELING REGARDING ADVANCED CARE PLANNING AND GOALS OF CARE: Status: ACTIVE | Noted: 2021-01-01

## 2021-10-05 PROBLEM — G93.6 CYTOTOXIC CEREBRAL EDEMA: Status: ACTIVE | Noted: 2021-01-01

## 2021-10-07 PROBLEM — Z51.5 COMFORT MEASURES ONLY STATUS: Status: ACTIVE | Noted: 2021-01-01

## (undated) DEVICE — SPRAY MASTISOL

## (undated) DEVICE — GLOVE SURG ULTRA TOUCH 7

## (undated) DEVICE — CARTRIDGE OIL

## (undated) DEVICE — STAPLER SKIN PROXIMATE WIDE

## (undated) DEVICE — CLIPS RANEY SCALP FFS/ASSY

## (undated) DEVICE — DRAPE INCISE IOBAN 2 23X17IN

## (undated) DEVICE — DRESSING TELFA STRL 4X3 LF

## (undated) DEVICE — TUBING INFLOW HYSTEROSCOPY

## (undated) DEVICE — SOL NACL IRR 3000ML

## (undated) DEVICE — CATH 16FR URETHRL RED RUB

## (undated) DEVICE — CLOSURE SKIN STERI STRIP 1/2X4

## (undated) DEVICE — SEE MEDLINE ITEM 152622

## (undated) DEVICE — COVER SURG LIGHT HANDLE

## (undated) DEVICE — COVER LIGHT HANDLE 80/CA

## (undated) DEVICE — SPONGE NEURO 1/4X1/4

## (undated) DEVICE — DRESSING SURGICAL 1X1

## (undated) DEVICE — WARMER DRAPE STERILE LF

## (undated) DEVICE — DRAIN TLS 7MM POREX

## (undated) DEVICE — BUR BONE CUT MICRO TPS 3X3.8MM

## (undated) DEVICE — RUBBERBAND STERILE 3X1/8IN

## (undated) DEVICE — PAD ADULT ELECTRODE GROUNDING

## (undated) DEVICE — SOL NACL IRR 1000ML BTL

## (undated) DEVICE — BULB SYRINGE EAR IRRIGATION

## (undated) DEVICE — DIFFUSER

## (undated) DEVICE — GLOVE SURG ULTRA TOUCH 8

## (undated) DEVICE — MARKERS SPHERZ PASSIVE

## (undated) DEVICE — TUBE FRAZIER 5MM 2FT SOFT TIP

## (undated) DEVICE — DRESSING SURGICAL 1/2X1/2

## (undated) DEVICE — SEE MEDLINE ITEM 156905

## (undated) DEVICE — GAUZE SPONGE XRAY 4X4

## (undated) DEVICE — DRAPE OPMI STERILE

## (undated) DEVICE — HOOK STAY ELAS 5MM 8EA/PK

## (undated) DEVICE — DRUG BACITRACIN UNGT OINTMENT

## (undated) DEVICE — SUT 4/0 18IN NUROLON BLK B

## (undated) DEVICE — SEE MEDLINE ITEM 157116

## (undated) DEVICE — DRAPE UNDER BUTTOCKS WITH POUCH

## (undated) DEVICE — MARKER SKIN STND TIP BLUE BARR

## (undated) DEVICE — SEE MEDLINE ITEM 157117

## (undated) DEVICE — PACK DRAPE PERI/GYN TIBURON

## (undated) DEVICE — BLADE SURG CARBON STEEL SZ11

## (undated) DEVICE — TAPE SURG MEDIPORE 6X72IN

## (undated) DEVICE — CANISTER SUCTION 3000CC

## (undated) DEVICE — KIT SURGIFLO EVITHROM

## (undated) DEVICE — ELECTRODE REM PLYHSV RETURN 9

## (undated) DEVICE — Device

## (undated) DEVICE — SUT VICRYL PLUS 3-0 SH 18IN

## (undated) DEVICE — CONTAINER SPECIMEN STRL 4OZ

## (undated) DEVICE — TRAY DRY SKIN SCRUB PREP

## (undated) DEVICE — ROUTER TAPERED 2.3MM

## (undated) DEVICE — CORD BIPOLAR 12 FOOT

## (undated) DEVICE — KIT EVACUATOR 3-SPRING 1/8 DRN

## (undated) DEVICE — DRESSING SURGICAL 1X3

## (undated) DEVICE — SLEEVE SCD EXPRESS CALF LARGE

## (undated) DEVICE — SEE MEDLINE ITEM 154981

## (undated) DEVICE — DRAPE STERI INSTRUMENT 1018

## (undated) DEVICE — TUBING OUTFLOW/HYSTEROSCOPY

## (undated) DEVICE — DRAPE THYROID WITH ARMBOARD

## (undated) DEVICE — HEMOSTAT SURGICEL 4X8IN

## (undated) DEVICE — SPONGE NOVAPLUS LAP 18X18IN

## (undated) DEVICE — STOCKINET TUBULAR 1 PLY 6X60IN